# Patient Record
Sex: MALE | Race: WHITE | NOT HISPANIC OR LATINO | Employment: PART TIME | URBAN - METROPOLITAN AREA
[De-identification: names, ages, dates, MRNs, and addresses within clinical notes are randomized per-mention and may not be internally consistent; named-entity substitution may affect disease eponyms.]

---

## 2017-01-06 ENCOUNTER — COMMUNICATION - HEALTHEAST (OUTPATIENT)
Dept: ADMINISTRATIVE | Facility: CLINIC | Age: 52
End: 2017-01-06

## 2017-01-30 ENCOUNTER — COMMUNICATION - HEALTHEAST (OUTPATIENT)
Dept: FAMILY MEDICINE | Facility: CLINIC | Age: 52
End: 2017-01-30

## 2017-01-30 DIAGNOSIS — F25.9 SCHIZOAFFECTIVE DISORDER, UNSPECIFIED TYPE (H): ICD-10-CM

## 2017-01-30 DIAGNOSIS — G47.9 TROUBLE IN SLEEPING: ICD-10-CM

## 2017-03-02 ENCOUNTER — COMMUNICATION - HEALTHEAST (OUTPATIENT)
Dept: FAMILY MEDICINE | Facility: CLINIC | Age: 52
End: 2017-03-02

## 2017-03-03 ENCOUNTER — COMMUNICATION - HEALTHEAST (OUTPATIENT)
Dept: FAMILY MEDICINE | Facility: CLINIC | Age: 52
End: 2017-03-03

## 2017-03-03 DIAGNOSIS — J30.9 ALLERGIC RHINITIS: ICD-10-CM

## 2017-03-05 RX ORDER — LORATADINE 10 MG/1
10 TABLET ORAL DAILY
Qty: 100 TABLET | Refills: 3 | Status: SHIPPED | OUTPATIENT
Start: 2017-03-05

## 2017-03-06 ENCOUNTER — RECORDS - HEALTHEAST (OUTPATIENT)
Dept: ADMINISTRATIVE | Facility: OTHER | Age: 52
End: 2017-03-06

## 2017-03-15 ENCOUNTER — RECORDS - HEALTHEAST (OUTPATIENT)
Dept: ADMINISTRATIVE | Facility: OTHER | Age: 52
End: 2017-03-15

## 2017-05-31 ENCOUNTER — AMBULATORY - HEALTHEAST (OUTPATIENT)
Dept: LAB | Facility: CLINIC | Age: 52
End: 2017-05-31

## 2017-05-31 DIAGNOSIS — C61 PROSTATE CANCER (H): ICD-10-CM

## 2017-06-06 ENCOUNTER — AMBULATORY - HEALTHEAST (OUTPATIENT)
Dept: LAB | Facility: CLINIC | Age: 52
End: 2017-06-06

## 2017-06-06 DIAGNOSIS — C61 PROSTATE CANCER (H): ICD-10-CM

## 2017-06-06 LAB — PSA SERPL-MCNC: <0.1 NG/ML (ref 0–3.5)

## 2017-06-07 ENCOUNTER — COMMUNICATION - HEALTHEAST (OUTPATIENT)
Dept: FAMILY MEDICINE | Facility: CLINIC | Age: 52
End: 2017-06-07

## 2017-08-08 ENCOUNTER — COMMUNICATION - HEALTHEAST (OUTPATIENT)
Dept: FAMILY MEDICINE | Facility: CLINIC | Age: 52
End: 2017-08-08

## 2017-08-08 DIAGNOSIS — R32 INCONTINENCE: ICD-10-CM

## 2017-08-08 DIAGNOSIS — Z79.899 MEDICATION MANAGEMENT: ICD-10-CM

## 2017-08-08 DIAGNOSIS — F41.9 ANXIETY: ICD-10-CM

## 2017-08-08 DIAGNOSIS — F32.A DEPRESSION: ICD-10-CM

## 2017-08-11 ENCOUNTER — RECORDS - HEALTHEAST (OUTPATIENT)
Dept: ADMINISTRATIVE | Facility: OTHER | Age: 52
End: 2017-08-11

## 2017-08-17 ENCOUNTER — COMMUNICATION - HEALTHEAST (OUTPATIENT)
Dept: FAMILY MEDICINE | Facility: CLINIC | Age: 52
End: 2017-08-17

## 2017-08-17 DIAGNOSIS — G47.9 TROUBLE IN SLEEPING: ICD-10-CM

## 2017-08-17 DIAGNOSIS — F25.9 SCHIZOAFFECTIVE DISORDER, UNSPECIFIED TYPE (H): ICD-10-CM

## 2017-09-11 ENCOUNTER — COMMUNICATION - HEALTHEAST (OUTPATIENT)
Dept: ADMINISTRATIVE | Facility: CLINIC | Age: 52
End: 2017-09-11

## 2017-09-21 ENCOUNTER — OFFICE VISIT - HEALTHEAST (OUTPATIENT)
Dept: FAMILY MEDICINE | Facility: CLINIC | Age: 52
End: 2017-09-21

## 2017-09-21 ENCOUNTER — AMBULATORY - HEALTHEAST (OUTPATIENT)
Dept: LAB | Facility: HOSPITAL | Age: 52
End: 2017-09-21

## 2017-09-21 DIAGNOSIS — R30.0 DYSURIA: ICD-10-CM

## 2017-09-21 DIAGNOSIS — C61 PROSTATE CANCER (H): ICD-10-CM

## 2017-09-21 LAB — PSA SERPL-MCNC: <0.1 NG/ML (ref 0–3.5)

## 2017-10-08 ENCOUNTER — COMMUNICATION - HEALTHEAST (OUTPATIENT)
Dept: FAMILY MEDICINE | Facility: CLINIC | Age: 52
End: 2017-10-08

## 2017-10-08 DIAGNOSIS — G47.9 TROUBLE IN SLEEPING: ICD-10-CM

## 2017-10-08 DIAGNOSIS — F25.9 SCHIZOAFFECTIVE DISORDER, UNSPECIFIED TYPE (H): ICD-10-CM

## 2017-10-09 RX ORDER — LORAZEPAM 0.5 MG/1
TABLET ORAL
Qty: 60 TABLET | Refills: 1 | Status: SHIPPED | OUTPATIENT
Start: 2017-10-09 | End: 2022-02-15

## 2017-10-24 ENCOUNTER — COMMUNICATION - HEALTHEAST (OUTPATIENT)
Dept: ADMINISTRATIVE | Facility: CLINIC | Age: 52
End: 2017-10-24

## 2017-10-25 ENCOUNTER — RECORDS - HEALTHEAST (OUTPATIENT)
Dept: ADMINISTRATIVE | Facility: OTHER | Age: 52
End: 2017-10-25

## 2017-12-18 ENCOUNTER — COMMUNICATION - HEALTHEAST (OUTPATIENT)
Dept: ADMINISTRATIVE | Facility: CLINIC | Age: 52
End: 2017-12-18

## 2017-12-19 ENCOUNTER — OFFICE VISIT - HEALTHEAST (OUTPATIENT)
Dept: FAMILY MEDICINE | Facility: CLINIC | Age: 52
End: 2017-12-19

## 2017-12-19 DIAGNOSIS — F33.1 MODERATE EPISODE OF RECURRENT MAJOR DEPRESSIVE DISORDER (H): ICD-10-CM

## 2017-12-19 DIAGNOSIS — Z23 NEED FOR IMMUNIZATION AGAINST INFLUENZA: ICD-10-CM

## 2017-12-19 DIAGNOSIS — Z13.220 ENCOUNTER FOR SCREENING FOR LIPOID DISORDERS: ICD-10-CM

## 2017-12-19 DIAGNOSIS — F09 MILD COGNITIVE DISORDER: ICD-10-CM

## 2017-12-19 DIAGNOSIS — H61.23 BILATERAL IMPACTED CERUMEN: ICD-10-CM

## 2017-12-19 DIAGNOSIS — Z00.01 ENCOUNTER FOR GENERAL ADULT MEDICAL EXAMINATION WITH ABNORMAL FINDINGS: ICD-10-CM

## 2017-12-19 DIAGNOSIS — F25.9 SCHIZOAFFECTIVE DISORDER, UNSPECIFIED TYPE (H): ICD-10-CM

## 2017-12-19 DIAGNOSIS — E66.09 CLASS 2 OBESITY DUE TO EXCESS CALORIES WITHOUT SERIOUS COMORBIDITY WITH BODY MASS INDEX (BMI) OF 36.0 TO 36.9 IN ADULT: ICD-10-CM

## 2017-12-19 DIAGNOSIS — E66.812 CLASS 2 OBESITY DUE TO EXCESS CALORIES WITHOUT SERIOUS COMORBIDITY WITH BODY MASS INDEX (BMI) OF 36.0 TO 36.9 IN ADULT: ICD-10-CM

## 2017-12-19 DIAGNOSIS — R32 INCONTINENCE: ICD-10-CM

## 2017-12-19 LAB
CHOLEST SERPL-MCNC: 137 MG/DL
FASTING STATUS PATIENT QL REPORTED: YES
HDLC SERPL-MCNC: 32 MG/DL
LDLC SERPL CALC-MCNC: 81 MG/DL
TRIGL SERPL-MCNC: 119 MG/DL

## 2017-12-19 ASSESSMENT — MIFFLIN-ST. JEOR: SCORE: 1751.83

## 2017-12-20 ENCOUNTER — COMMUNICATION - HEALTHEAST (OUTPATIENT)
Dept: FAMILY MEDICINE | Facility: CLINIC | Age: 52
End: 2017-12-20

## 2018-01-29 ENCOUNTER — RECORDS - HEALTHEAST (OUTPATIENT)
Dept: ADMINISTRATIVE | Facility: OTHER | Age: 53
End: 2018-01-29

## 2018-04-12 ENCOUNTER — OFFICE VISIT - HEALTHEAST (OUTPATIENT)
Dept: FAMILY MEDICINE | Facility: CLINIC | Age: 53
End: 2018-04-12

## 2018-04-12 DIAGNOSIS — R32 INCONTINENCE: ICD-10-CM

## 2018-04-12 DIAGNOSIS — K52.9 FREQUENT STOOLS: ICD-10-CM

## 2018-04-12 DIAGNOSIS — E66.9 OBESE: ICD-10-CM

## 2018-04-12 DIAGNOSIS — R63.1 INCREASED THIRST: ICD-10-CM

## 2018-04-12 DIAGNOSIS — R35.0 FREQUENT URINATION: ICD-10-CM

## 2018-04-12 DIAGNOSIS — C61 PROSTATE CANCER (H): ICD-10-CM

## 2018-04-12 LAB
ALBUMIN UR-MCNC: NEGATIVE MG/DL
ANION GAP SERPL CALCULATED.3IONS-SCNC: 8 MMOL/L (ref 5–18)
APPEARANCE UR: CLEAR
BILIRUB UR QL STRIP: NEGATIVE
BUN SERPL-MCNC: 14 MG/DL (ref 8–22)
CALCIUM SERPL-MCNC: 9.4 MG/DL (ref 8.5–10.5)
CHLORIDE BLD-SCNC: 103 MMOL/L (ref 98–107)
CO2 SERPL-SCNC: 27 MMOL/L (ref 22–31)
COLOR UR AUTO: YELLOW
CREAT SERPL-MCNC: 0.83 MG/DL (ref 0.7–1.3)
GFR SERPL CREATININE-BSD FRML MDRD: >60 ML/MIN/1.73M2
GLUCOSE BLD-MCNC: 81 MG/DL (ref 70–125)
GLUCOSE UR STRIP-MCNC: NEGATIVE MG/DL
HGB UR QL STRIP: NEGATIVE
KETONES UR STRIP-MCNC: NEGATIVE MG/DL
LEUKOCYTE ESTERASE UR QL STRIP: NEGATIVE
NITRATE UR QL: NEGATIVE
PH UR STRIP: 7 [PH] (ref 5–8)
POTASSIUM BLD-SCNC: 4.8 MMOL/L (ref 3.5–5)
PSA SERPL-MCNC: <0.1 NG/ML (ref 0–3.5)
SODIUM SERPL-SCNC: 138 MMOL/L (ref 136–145)
SP GR UR STRIP: 1.01 (ref 1–1.03)
UROBILINOGEN UR STRIP-ACNC: NORMAL

## 2018-04-12 ASSESSMENT — MIFFLIN-ST. JEOR: SCORE: 1749.79

## 2018-04-13 ENCOUNTER — COMMUNICATION - HEALTHEAST (OUTPATIENT)
Dept: FAMILY MEDICINE | Facility: CLINIC | Age: 53
End: 2018-04-13

## 2018-04-27 ENCOUNTER — OFFICE VISIT - HEALTHEAST (OUTPATIENT)
Dept: AUDIOLOGY | Facility: CLINIC | Age: 53
End: 2018-04-27

## 2018-04-27 DIAGNOSIS — H61.23 EXCESSIVE EAR WAX, BILATERAL: ICD-10-CM

## 2018-05-04 ENCOUNTER — RECORDS - HEALTHEAST (OUTPATIENT)
Dept: ADMINISTRATIVE | Facility: OTHER | Age: 53
End: 2018-05-04

## 2018-05-11 ENCOUNTER — OFFICE VISIT - HEALTHEAST (OUTPATIENT)
Dept: OTOLARYNGOLOGY | Facility: CLINIC | Age: 53
End: 2018-05-11

## 2018-05-11 DIAGNOSIS — H61.23 BILATERAL IMPACTED CERUMEN: ICD-10-CM

## 2018-05-11 DIAGNOSIS — D16.4 OSTEOMA OF EAR CANAL: ICD-10-CM

## 2018-05-24 ENCOUNTER — AMBULATORY - HEALTHEAST (OUTPATIENT)
Dept: LAB | Facility: CLINIC | Age: 53
End: 2018-05-24

## 2018-05-24 DIAGNOSIS — F25.0 SCHIZOAFFECTIVE DISORDER, BIPOLAR TYPE (H): ICD-10-CM

## 2018-05-24 LAB
CHOLEST SERPL-MCNC: 173 MG/DL
FASTING STATUS PATIENT QL REPORTED: YES
FASTING STATUS PATIENT QL REPORTED: YES
GLUCOSE BLD-MCNC: 94 MG/DL (ref 70–99)
HDLC SERPL-MCNC: 30 MG/DL
LDLC SERPL CALC-MCNC: 105 MG/DL
TRIGL SERPL-MCNC: 190 MG/DL

## 2018-05-29 ENCOUNTER — OFFICE VISIT - HEALTHEAST (OUTPATIENT)
Dept: AUDIOLOGY | Facility: CLINIC | Age: 53
End: 2018-05-29

## 2018-05-29 DIAGNOSIS — Z01.10 EXAMINATION OF EARS AND HEARING: ICD-10-CM

## 2018-05-29 DIAGNOSIS — H90.41 SENSORINEURAL HEARING LOSS (SNHL) OF RIGHT EAR WITH UNRESTRICTED HEARING OF LEFT EAR: ICD-10-CM

## 2018-07-05 ENCOUNTER — COMMUNICATION - HEALTHEAST (OUTPATIENT)
Dept: FAMILY MEDICINE | Facility: CLINIC | Age: 53
End: 2018-07-05

## 2018-07-06 ENCOUNTER — COMMUNICATION - HEALTHEAST (OUTPATIENT)
Dept: FAMILY MEDICINE | Facility: CLINIC | Age: 53
End: 2018-07-06

## 2018-07-08 RX ORDER — POLYETHYLENE GLYCOL 3350 17 G/17G
POWDER, FOR SOLUTION ORAL
Qty: 14 EACH | Refills: 11 | Status: SHIPPED | OUTPATIENT
Start: 2018-07-08

## 2018-07-14 ENCOUNTER — OFFICE VISIT - HEALTHEAST (OUTPATIENT)
Dept: FAMILY MEDICINE | Facility: CLINIC | Age: 53
End: 2018-07-14

## 2018-07-14 DIAGNOSIS — M25.521 PAIN IN JOINT, UPPER ARM, RIGHT: ICD-10-CM

## 2018-07-14 RX ORDER — FLUOXETINE 40 MG/1
40 CAPSULE ORAL DAILY
Status: SHIPPED | COMMUNITY
Start: 2018-07-14

## 2018-08-23 ENCOUNTER — RECORDS - HEALTHEAST (OUTPATIENT)
Dept: ADMINISTRATIVE | Facility: OTHER | Age: 53
End: 2018-08-23

## 2018-09-10 ENCOUNTER — OFFICE VISIT - HEALTHEAST (OUTPATIENT)
Dept: FAMILY MEDICINE | Facility: CLINIC | Age: 53
End: 2018-09-10

## 2018-09-10 DIAGNOSIS — E66.09 CLASS 2 OBESITY DUE TO EXCESS CALORIES WITHOUT SERIOUS COMORBIDITY WITH BODY MASS INDEX (BMI) OF 35.0 TO 35.9 IN ADULT: ICD-10-CM

## 2018-09-10 DIAGNOSIS — E66.812 CLASS 2 OBESITY DUE TO EXCESS CALORIES WITHOUT SERIOUS COMORBIDITY WITH BODY MASS INDEX (BMI) OF 35.0 TO 35.9 IN ADULT: ICD-10-CM

## 2018-09-10 DIAGNOSIS — G47.33 OBSTRUCTIVE SLEEP APNEA SYNDROME: ICD-10-CM

## 2018-09-10 DIAGNOSIS — Z23 NEED FOR VACCINATION: ICD-10-CM

## 2018-09-10 DIAGNOSIS — R32 URINARY INCONTINENCE, UNSPECIFIED TYPE: ICD-10-CM

## 2018-09-10 DIAGNOSIS — G47.9 TROUBLE IN SLEEPING: ICD-10-CM

## 2018-09-10 DIAGNOSIS — R68.2 DRY MOUTH: ICD-10-CM

## 2018-09-10 ASSESSMENT — MIFFLIN-ST. JEOR: SCORE: 1711.69

## 2018-09-19 ENCOUNTER — RECORDS - HEALTHEAST (OUTPATIENT)
Dept: ADMINISTRATIVE | Facility: OTHER | Age: 53
End: 2018-09-19

## 2018-10-17 ENCOUNTER — RECORDS - HEALTHEAST (OUTPATIENT)
Dept: ADMINISTRATIVE | Facility: OTHER | Age: 53
End: 2018-10-17

## 2018-10-18 ENCOUNTER — OFFICE VISIT - HEALTHEAST (OUTPATIENT)
Dept: SLEEP MEDICINE | Facility: CLINIC | Age: 53
End: 2018-10-18

## 2018-10-18 DIAGNOSIS — G47.10 HYPERSOMNIA: ICD-10-CM

## 2018-10-18 DIAGNOSIS — G47.8 SLEEP DYSFUNCTION WITH SLEEP STAGE DISTURBANCE: ICD-10-CM

## 2018-10-18 DIAGNOSIS — E66.9 OBESITY: ICD-10-CM

## 2018-10-18 DIAGNOSIS — R06.83 SNORING: ICD-10-CM

## 2018-10-18 ASSESSMENT — MIFFLIN-ST. JEOR: SCORE: 1708.97

## 2018-11-12 ENCOUNTER — RECORDS - HEALTHEAST (OUTPATIENT)
Dept: SLEEP MEDICINE | Facility: CLINIC | Age: 53
End: 2018-11-12

## 2018-11-12 DIAGNOSIS — G47.10 HYPERSOMNIA, UNSPECIFIED: ICD-10-CM

## 2018-11-12 DIAGNOSIS — G47.8 OTHER SLEEP DISORDERS: ICD-10-CM

## 2018-11-12 DIAGNOSIS — E66.9 OBESITY, UNSPECIFIED: ICD-10-CM

## 2018-11-12 DIAGNOSIS — R06.83 SNORING: ICD-10-CM

## 2018-11-15 ENCOUNTER — COMMUNICATION - HEALTHEAST (OUTPATIENT)
Dept: SLEEP MEDICINE | Facility: CLINIC | Age: 53
End: 2018-11-15

## 2018-11-16 ENCOUNTER — COMMUNICATION - HEALTHEAST (OUTPATIENT)
Dept: SLEEP MEDICINE | Facility: CLINIC | Age: 53
End: 2018-11-16

## 2018-11-20 ENCOUNTER — OFFICE VISIT - HEALTHEAST (OUTPATIENT)
Dept: SLEEP MEDICINE | Facility: CLINIC | Age: 53
End: 2018-11-20

## 2018-11-20 DIAGNOSIS — G47.10 HYPERSOMNIA: ICD-10-CM

## 2018-11-20 DIAGNOSIS — G47.33 OBSTRUCTIVE SLEEP APNEA: ICD-10-CM

## 2018-11-20 DIAGNOSIS — G47.69 SLEEP-RELATED MOVEMENT DISORDER: ICD-10-CM

## 2018-12-04 ENCOUNTER — COMMUNICATION - HEALTHEAST (OUTPATIENT)
Dept: FAMILY MEDICINE | Facility: CLINIC | Age: 53
End: 2018-12-04

## 2018-12-05 ENCOUNTER — COMMUNICATION - HEALTHEAST (OUTPATIENT)
Dept: OTHER | Facility: CLINIC | Age: 53
End: 2018-12-05

## 2018-12-07 ENCOUNTER — AMBULATORY - HEALTHEAST (OUTPATIENT)
Dept: OTHER | Facility: CLINIC | Age: 53
End: 2018-12-07

## 2019-01-08 ENCOUNTER — RECORDS - HEALTHEAST (OUTPATIENT)
Dept: ADMINISTRATIVE | Facility: OTHER | Age: 54
End: 2019-01-08

## 2019-01-15 ENCOUNTER — OFFICE VISIT - HEALTHEAST (OUTPATIENT)
Dept: FAMILY MEDICINE | Facility: CLINIC | Age: 54
End: 2019-01-15

## 2019-01-15 ENCOUNTER — COMMUNICATION - HEALTHEAST (OUTPATIENT)
Dept: FAMILY MEDICINE | Facility: CLINIC | Age: 54
End: 2019-01-15

## 2019-01-15 DIAGNOSIS — Z13.220 LIPID SCREENING: ICD-10-CM

## 2019-01-15 DIAGNOSIS — R32 URINARY INCONTINENCE, UNSPECIFIED TYPE: ICD-10-CM

## 2019-01-15 DIAGNOSIS — F25.9 SCHIZOAFFECTIVE DISORDER, UNSPECIFIED TYPE (H): ICD-10-CM

## 2019-01-15 DIAGNOSIS — F09 MILD COGNITIVE DISORDER: ICD-10-CM

## 2019-01-15 DIAGNOSIS — Z00.00 ROUTINE GENERAL MEDICAL EXAMINATION AT A HEALTH CARE FACILITY: ICD-10-CM

## 2019-01-15 DIAGNOSIS — C61 PROSTATE CANCER (H): ICD-10-CM

## 2019-01-15 DIAGNOSIS — F33.1 MODERATE EPISODE OF RECURRENT MAJOR DEPRESSIVE DISORDER (H): ICD-10-CM

## 2019-01-15 DIAGNOSIS — E66.01 MORBID OBESITY (H): ICD-10-CM

## 2019-01-15 LAB
ANION GAP SERPL CALCULATED.3IONS-SCNC: 7 MMOL/L (ref 5–18)
BUN SERPL-MCNC: 12 MG/DL (ref 8–22)
CALCIUM SERPL-MCNC: 9 MG/DL (ref 8.5–10.5)
CHLORIDE BLD-SCNC: 104 MMOL/L (ref 98–107)
CHOLEST SERPL-MCNC: 179 MG/DL
CO2 SERPL-SCNC: 27 MMOL/L (ref 22–31)
CREAT SERPL-MCNC: 0.86 MG/DL (ref 0.7–1.3)
FASTING STATUS PATIENT QL REPORTED: YES
GFR SERPL CREATININE-BSD FRML MDRD: >60 ML/MIN/1.73M2
GLUCOSE BLD-MCNC: 89 MG/DL (ref 70–125)
HDLC SERPL-MCNC: 35 MG/DL
LDLC SERPL CALC-MCNC: 107 MG/DL
POTASSIUM BLD-SCNC: 4.3 MMOL/L (ref 3.5–5)
SODIUM SERPL-SCNC: 138 MMOL/L (ref 136–145)
TRIGL SERPL-MCNC: 184 MG/DL

## 2019-01-15 ASSESSMENT — MIFFLIN-ST. JEOR: SCORE: 1671.55

## 2019-01-25 ENCOUNTER — OFFICE VISIT - HEALTHEAST (OUTPATIENT)
Dept: OTOLARYNGOLOGY | Facility: CLINIC | Age: 54
End: 2019-01-25

## 2019-01-25 ENCOUNTER — OFFICE VISIT - HEALTHEAST (OUTPATIENT)
Dept: SLEEP MEDICINE | Facility: CLINIC | Age: 54
End: 2019-01-25

## 2019-01-25 DIAGNOSIS — G47.8 SLEEP DYSFUNCTION WITH SLEEP STAGE DISTURBANCE: ICD-10-CM

## 2019-01-25 DIAGNOSIS — G47.33 OBSTRUCTIVE SLEEP APNEA: ICD-10-CM

## 2019-01-25 DIAGNOSIS — H61.23 BILATERAL IMPACTED CERUMEN: ICD-10-CM

## 2019-01-25 ASSESSMENT — MIFFLIN-ST. JEOR: SCORE: 1705.79

## 2019-02-21 ENCOUNTER — RECORDS - HEALTHEAST (OUTPATIENT)
Dept: ADMINISTRATIVE | Facility: OTHER | Age: 54
End: 2019-02-21

## 2019-03-14 ENCOUNTER — RECORDS - HEALTHEAST (OUTPATIENT)
Dept: ADMINISTRATIVE | Facility: OTHER | Age: 54
End: 2019-03-14

## 2019-03-25 ENCOUNTER — RECORDS - HEALTHEAST (OUTPATIENT)
Dept: ADMINISTRATIVE | Facility: OTHER | Age: 54
End: 2019-03-25

## 2019-03-25 ENCOUNTER — OFFICE VISIT - HEALTHEAST (OUTPATIENT)
Dept: FAMILY MEDICINE | Facility: CLINIC | Age: 54
End: 2019-03-25

## 2019-03-25 DIAGNOSIS — R63.1 POLYDIPSIA: ICD-10-CM

## 2019-03-25 LAB
ANION GAP SERPL CALCULATED.3IONS-SCNC: 8 MMOL/L (ref 5–18)
BUN SERPL-MCNC: 13 MG/DL (ref 8–22)
CALCIUM SERPL-MCNC: 9.3 MG/DL (ref 8.5–10.5)
CHLORIDE BLD-SCNC: 102 MMOL/L (ref 98–107)
CHLORIDE UR-SCNC: 26 MMOL/L
CO2 SERPL-SCNC: 27 MMOL/L (ref 22–31)
CREAT SERPL-MCNC: 0.88 MG/DL (ref 0.7–1.3)
GFR SERPL CREATININE-BSD FRML MDRD: >60 ML/MIN/1.73M2
GLUCOSE BLD-MCNC: 81 MG/DL (ref 70–125)
GLUCOSE UR STRIP-MCNC: NEGATIVE MG/DL
OSMOLALITY UR: 135 MOSM/KG (ref 300–900)
POTASSIUM BLD-SCNC: 4.8 MMOL/L (ref 3.5–5)
POTASSIUM UR-SCNC: 25.4 MMOL/L
SODIUM SERPL-SCNC: 137 MMOL/L (ref 136–145)
SODIUM UR-SCNC: 25 MMOL/L
UUN UR-MCNC: 144 MG/DL

## 2019-03-25 RX ORDER — MIRABEGRON 50 MG/1
50 TABLET, EXTENDED RELEASE ORAL DAILY
Status: SHIPPED | COMMUNITY
Start: 2019-03-25

## 2019-03-25 ASSESSMENT — MIFFLIN-ST. JEOR: SCORE: 1708.51

## 2019-03-27 ENCOUNTER — COMMUNICATION - HEALTHEAST (OUTPATIENT)
Dept: FAMILY MEDICINE | Facility: CLINIC | Age: 54
End: 2019-03-27

## 2019-04-12 ENCOUNTER — AMBULATORY - HEALTHEAST (OUTPATIENT)
Dept: LAB | Facility: CLINIC | Age: 54
End: 2019-04-12

## 2019-04-16 ENCOUNTER — AMBULATORY - HEALTHEAST (OUTPATIENT)
Dept: FAMILY MEDICINE | Facility: CLINIC | Age: 54
End: 2019-04-16

## 2019-04-16 DIAGNOSIS — R63.1 POLYDIPSIA: ICD-10-CM

## 2019-04-17 ENCOUNTER — AMBULATORY - HEALTHEAST (OUTPATIENT)
Dept: LAB | Facility: CLINIC | Age: 54
End: 2019-04-17

## 2019-04-17 DIAGNOSIS — R63.1 POLYDIPSIA: ICD-10-CM

## 2019-04-17 LAB
ANION GAP SERPL CALCULATED.3IONS-SCNC: 8 MMOL/L (ref 5–18)
BUN SERPL-MCNC: 13 MG/DL (ref 8–22)
CALCIUM SERPL-MCNC: 9.2 MG/DL (ref 8.5–10.5)
CHLORIDE BLD-SCNC: 106 MMOL/L (ref 98–107)
CO2 SERPL-SCNC: 25 MMOL/L (ref 22–31)
CREAT SERPL-MCNC: 0.85 MG/DL (ref 0.7–1.3)
GFR SERPL CREATININE-BSD FRML MDRD: >60 ML/MIN/1.73M2
GLUCOSE BLD-MCNC: 100 MG/DL (ref 70–125)
OSMOLALITY SERPL: 291 MOSM/KG (ref 270–300)
OSMOLALITY UR: 524 MOSM/KG (ref 300–900)
POTASSIUM BLD-SCNC: 4.4 MMOL/L (ref 3.5–5)
SODIUM SERPL-SCNC: 139 MMOL/L (ref 136–145)

## 2019-04-18 ENCOUNTER — COMMUNICATION - HEALTHEAST (OUTPATIENT)
Dept: FAMILY MEDICINE | Facility: CLINIC | Age: 54
End: 2019-04-18

## 2019-06-05 ENCOUNTER — RECORDS - HEALTHEAST (OUTPATIENT)
Dept: ADMINISTRATIVE | Facility: OTHER | Age: 54
End: 2019-06-05

## 2019-06-14 ENCOUNTER — RECORDS - HEALTHEAST (OUTPATIENT)
Dept: ADMINISTRATIVE | Facility: OTHER | Age: 54
End: 2019-06-14

## 2019-07-03 ENCOUNTER — OFFICE VISIT - HEALTHEAST (OUTPATIENT)
Dept: SLEEP MEDICINE | Facility: CLINIC | Age: 54
End: 2019-07-03

## 2019-07-03 DIAGNOSIS — G47.33 OBSTRUCTIVE SLEEP APNEA: ICD-10-CM

## 2019-07-03 DIAGNOSIS — G47.8 SLEEP DYSFUNCTION WITH SLEEP STAGE DISTURBANCE: ICD-10-CM

## 2019-07-03 DIAGNOSIS — G47.10 HYPERSOMNIA: ICD-10-CM

## 2019-07-03 ASSESSMENT — MIFFLIN-ST. JEOR: SCORE: 1770.2

## 2019-07-17 ENCOUNTER — RECORDS - HEALTHEAST (OUTPATIENT)
Dept: SLEEP MEDICINE | Facility: CLINIC | Age: 54
End: 2019-07-17

## 2019-07-17 DIAGNOSIS — G47.10 HYPERSOMNIA, UNSPECIFIED: ICD-10-CM

## 2019-07-17 DIAGNOSIS — G47.33 OBSTRUCTIVE SLEEP APNEA (ADULT) (PEDIATRIC): ICD-10-CM

## 2019-07-17 DIAGNOSIS — G47.8 OTHER SLEEP DISORDERS: ICD-10-CM

## 2019-07-23 ENCOUNTER — COMMUNICATION - HEALTHEAST (OUTPATIENT)
Dept: SLEEP MEDICINE | Facility: CLINIC | Age: 54
End: 2019-07-23

## 2019-07-24 ENCOUNTER — RECORDS - HEALTHEAST (OUTPATIENT)
Dept: ADMINISTRATIVE | Facility: OTHER | Age: 54
End: 2019-07-24

## 2019-07-29 ENCOUNTER — COMMUNICATION - HEALTHEAST (OUTPATIENT)
Dept: SLEEP MEDICINE | Facility: CLINIC | Age: 54
End: 2019-07-29

## 2019-07-29 DIAGNOSIS — G47.33 OBSTRUCTIVE SLEEP APNEA: ICD-10-CM

## 2019-08-14 ENCOUNTER — RECORDS - HEALTHEAST (OUTPATIENT)
Dept: ADMINISTRATIVE | Facility: OTHER | Age: 54
End: 2019-08-14

## 2019-10-02 ENCOUNTER — RECORDS - HEALTHEAST (OUTPATIENT)
Dept: ADMINISTRATIVE | Facility: OTHER | Age: 54
End: 2019-10-02

## 2019-10-09 ENCOUNTER — AMBULATORY - HEALTHEAST (OUTPATIENT)
Dept: NURSING | Facility: CLINIC | Age: 54
End: 2019-10-09

## 2019-10-16 ENCOUNTER — OFFICE VISIT - HEALTHEAST (OUTPATIENT)
Dept: SLEEP MEDICINE | Facility: CLINIC | Age: 54
End: 2019-10-16

## 2019-10-16 DIAGNOSIS — G47.10 HYPERSOMNIA: ICD-10-CM

## 2019-10-16 DIAGNOSIS — G47.33 OBSTRUCTIVE SLEEP APNEA: ICD-10-CM

## 2019-10-16 ASSESSMENT — MIFFLIN-ST. JEOR: SCORE: 1801.95

## 2020-01-06 ENCOUNTER — RECORDS - HEALTHEAST (OUTPATIENT)
Dept: ADMINISTRATIVE | Facility: OTHER | Age: 55
End: 2020-01-06

## 2020-01-09 ENCOUNTER — OFFICE VISIT - HEALTHEAST (OUTPATIENT)
Dept: FAMILY MEDICINE | Facility: CLINIC | Age: 55
End: 2020-01-09

## 2020-01-09 ENCOUNTER — COMMUNICATION - HEALTHEAST (OUTPATIENT)
Dept: FAMILY MEDICINE | Facility: CLINIC | Age: 55
End: 2020-01-09

## 2020-01-09 DIAGNOSIS — E66.01 MORBID OBESITY (H): ICD-10-CM

## 2020-01-09 DIAGNOSIS — F09 MILD COGNITIVE DISORDER: ICD-10-CM

## 2020-01-09 DIAGNOSIS — F33.42 RECURRENT MAJOR DEPRESSIVE DISORDER, IN FULL REMISSION (H): ICD-10-CM

## 2020-01-09 DIAGNOSIS — R32 URINARY INCONTINENCE, UNSPECIFIED TYPE: ICD-10-CM

## 2020-01-09 DIAGNOSIS — H61.23 BILATERAL IMPACTED CERUMEN: ICD-10-CM

## 2020-01-09 DIAGNOSIS — Z13.220 LIPID SCREENING: ICD-10-CM

## 2020-01-09 DIAGNOSIS — Z00.00 ROUTINE GENERAL MEDICAL EXAMINATION AT A HEALTH CARE FACILITY: ICD-10-CM

## 2020-01-09 DIAGNOSIS — C61 PROSTATE CANCER (H): ICD-10-CM

## 2020-01-09 DIAGNOSIS — F25.9 SCHIZOAFFECTIVE DISORDER, UNSPECIFIED TYPE (H): ICD-10-CM

## 2020-01-09 LAB
ANION GAP SERPL CALCULATED.3IONS-SCNC: 8 MMOL/L (ref 5–18)
BUN SERPL-MCNC: 10 MG/DL (ref 8–22)
CALCIUM SERPL-MCNC: 9.3 MG/DL (ref 8.5–10.5)
CHLORIDE BLD-SCNC: 108 MMOL/L (ref 98–107)
CHOLEST SERPL-MCNC: 196 MG/DL
CO2 SERPL-SCNC: 24 MMOL/L (ref 22–31)
CREAT SERPL-MCNC: 0.91 MG/DL (ref 0.7–1.3)
FASTING STATUS PATIENT QL REPORTED: YES
GFR SERPL CREATININE-BSD FRML MDRD: >60 ML/MIN/1.73M2
GLUCOSE BLD-MCNC: 104 MG/DL (ref 70–125)
HDLC SERPL-MCNC: 35 MG/DL
LDLC SERPL CALC-MCNC: 113 MG/DL
POTASSIUM BLD-SCNC: 4.2 MMOL/L (ref 3.5–5)
SODIUM SERPL-SCNC: 140 MMOL/L (ref 136–145)
TRIGL SERPL-MCNC: 241 MG/DL

## 2020-01-09 ASSESSMENT — ANXIETY QUESTIONNAIRES
1. FEELING NERVOUS, ANXIOUS, OR ON EDGE: NOT AT ALL
2. NOT BEING ABLE TO STOP OR CONTROL WORRYING: NOT AT ALL

## 2020-01-09 ASSESSMENT — MIFFLIN-ST. JEOR: SCORE: 1790.67

## 2020-01-09 ASSESSMENT — PATIENT HEALTH QUESTIONNAIRE - PHQ9: SUM OF ALL RESPONSES TO PHQ QUESTIONS 1-9: 4

## 2020-02-03 ENCOUNTER — RECORDS - HEALTHEAST (OUTPATIENT)
Dept: ADMINISTRATIVE | Facility: OTHER | Age: 55
End: 2020-02-03

## 2020-02-10 ENCOUNTER — RECORDS - HEALTHEAST (OUTPATIENT)
Dept: ADMINISTRATIVE | Facility: OTHER | Age: 55
End: 2020-02-10

## 2020-03-06 ENCOUNTER — COMMUNICATION - HEALTHEAST (OUTPATIENT)
Dept: SCHEDULING | Facility: CLINIC | Age: 55
End: 2020-03-06

## 2020-03-09 ENCOUNTER — OFFICE VISIT - HEALTHEAST (OUTPATIENT)
Dept: FAMILY MEDICINE | Facility: CLINIC | Age: 55
End: 2020-03-09

## 2020-03-09 DIAGNOSIS — Z20.828 EXPOSURE TO VIRAL DISEASE: ICD-10-CM

## 2020-03-09 DIAGNOSIS — R19.7 DIARRHEA, UNSPECIFIED TYPE: ICD-10-CM

## 2020-03-09 ASSESSMENT — MIFFLIN-ST. JEOR: SCORE: 1793.08

## 2020-06-05 ENCOUNTER — COMMUNICATION - HEALTHEAST (OUTPATIENT)
Dept: OTHER | Facility: CLINIC | Age: 55
End: 2020-06-05

## 2020-06-16 ENCOUNTER — RECORDS - HEALTHEAST (OUTPATIENT)
Dept: ADMINISTRATIVE | Facility: OTHER | Age: 55
End: 2020-06-16

## 2020-07-16 ENCOUNTER — COMMUNICATION - HEALTHEAST (OUTPATIENT)
Dept: FAMILY MEDICINE | Facility: CLINIC | Age: 55
End: 2020-07-16

## 2020-07-20 ENCOUNTER — RECORDS - HEALTHEAST (OUTPATIENT)
Dept: ADMINISTRATIVE | Facility: OTHER | Age: 55
End: 2020-07-20

## 2020-08-04 ENCOUNTER — COMMUNICATION - HEALTHEAST (OUTPATIENT)
Dept: PODIATRY | Facility: CLINIC | Age: 55
End: 2020-08-04

## 2020-08-05 ENCOUNTER — OFFICE VISIT - HEALTHEAST (OUTPATIENT)
Dept: PODIATRY | Facility: CLINIC | Age: 55
End: 2020-08-05

## 2020-08-05 DIAGNOSIS — L60.0 INGROWN NAIL: ICD-10-CM

## 2020-08-05 RX ORDER — GABAPENTIN 100 MG/1
CAPSULE ORAL
Status: SHIPPED | COMMUNITY
Start: 2020-07-22 | End: 2022-02-15

## 2020-08-05 RX ORDER — FESOTERODINE FUMARATE 4 MG/1
4 TABLET, FILM COATED, EXTENDED RELEASE ORAL DAILY
Status: SHIPPED | COMMUNITY
Start: 2020-07-22

## 2020-08-05 RX ORDER — ACETAMINOPHEN 500 MG
1000 TABLET ORAL EVERY 6 HOURS PRN
Status: SHIPPED | COMMUNITY
Start: 2020-08-05

## 2020-08-07 ENCOUNTER — TELEPHONE (OUTPATIENT)
Dept: SLEEP MEDICINE | Facility: CLINIC | Age: 55
End: 2020-08-07

## 2020-08-07 DIAGNOSIS — G47.33 OBSTRUCTIVE SLEEP APNEA: Primary | ICD-10-CM

## 2020-08-07 NOTE — TELEPHONE ENCOUNTER
Vivi at [facility] called today.    States that patient needs a new order for mask and head gear.  Patient states currently broken.    Would like a callback on order request and where to pickup up today at [phone].    Thank you.    Central Scheduling  Devi GAVIN

## 2020-08-11 ENCOUNTER — OFFICE VISIT - HEALTHEAST (OUTPATIENT)
Dept: PODIATRY | Facility: CLINIC | Age: 55
End: 2020-08-11

## 2020-08-11 DIAGNOSIS — L60.0 INGROWN NAIL: ICD-10-CM

## 2020-09-22 DIAGNOSIS — G47.33 OBSTRUCTIVE SLEEP APNEA: Primary | ICD-10-CM

## 2020-12-21 ENCOUNTER — RECORDS - HEALTHEAST (OUTPATIENT)
Dept: ADMINISTRATIVE | Facility: OTHER | Age: 55
End: 2020-12-21

## 2021-03-04 ENCOUNTER — TELEPHONE (OUTPATIENT)
Dept: SLEEP MEDICINE | Facility: CLINIC | Age: 56
End: 2021-03-04

## 2021-03-04 NOTE — TELEPHONE ENCOUNTER
CALLED AND SPOKE WITH JOSUE IN REGARDS TO THE PT DATA. THE JOSUE WHO IS THE PT NURSE STATED THAT THE DATA HAS NOT UPLOADED FOR THE LAST COUPLE OF NIGHTS. WHEN LOOKING AT THE DATA TRANSMISSION STOP ON 2/26/2021. HAD JOSUE UNPLUG THE CPAP MACHINE AND PLUG IN TO ANOTHER OUTLET AND POWER THE MACHINE ON TO FORCE A TRANSMISSION. THIS WAS A SUCCESS AND FORCE MORE DATA TO TRANSMIT.

## 2021-05-26 ASSESSMENT — PATIENT HEALTH QUESTIONNAIRE - PHQ9: SUM OF ALL RESPONSES TO PHQ QUESTIONS 1-9: 4

## 2021-05-27 NOTE — TELEPHONE ENCOUNTER
Spoke with group home. They will give this message to El caregiver, but message will probably need to be given again to her when she calls back. Please help set up lab appointment also when she calls back

## 2021-05-27 NOTE — TELEPHONE ENCOUNTER
----- Message from Cheryl Parikh MD sent at 4/17/2019  7:43 PM CDT -----  Please call patient's caregiver (?)  The labs show that Dayday does seem to concentrate the urine appropriately when he restricts fluids.  I would try to monitor the fluid intake a bit closer and try to have him stick with no more than 10 cups of fluid daily.  He can suck on biotene lozenges if his mouth feels dry, or possibly chew gum?  This should help with his excessive urination.  SUBHASH Parikh MD

## 2021-05-27 NOTE — PROGRESS NOTES
"  Assessment/Plan:      Polydipsia  Workup will be completed as below.  This would technically be more accurate with a 24-hour sample, but patient is incontinent and this would require catheterization, which she is very resistant to.  His urologist had suggested fluid restriction, I would suggest holding off until further workup is completed.  He has a dry mouth at baseline and has been utilizing some Biotene, which is appropriate.  After workup returns, we will discuss with patient's psychiatric PA to see if any of his current medications could be the culprit.  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  Unclear if patient was on lithium in the past.  He does not recall this.  - Osmolality, Random, Urine  - Sodium, Random Urine  - Potassium, Random Urine  - Chloride, Random Urine  - Urea Nitrogen, Random Urine  - Glucose,Urine,Semi-Quantitative  - Basic Metabolic Panel        Subjective:       Dayday Silva is a 53 y.o. male who presents for workup of possible diabetes insipidus.  Patient's urologist suspect that this is why patient is urinating so frequently and having incontinence.  He thinks this has been an issue for \"many years\", since he was a young person.  His , who accompanies him today, states that this was not as big of an issue when he initially moved into his current group home about 4 years ago.  She believes this issue has gotten worse.  He is thirsty much of the time, his urologist did some sort of quantification of how much she is taking in and he is drinking over 4 L of fluid daily apparently.  Is unclear what other testing was done at his urology appointment, but ultimately the concern was for possible diabetes insipidus and he was recommended to come here for further workup.  He was taken off oxybutynin and transitioned to Myrbetriq for his overactive bladder symptoms.  He also continues to complain of dry mouth.  It is unclear how long this " symptom has been present.  He has been utilizing some Biotene lozenges for this.  His urologist apparently recommended a water restriction.  His current psychiatric medications include Abilify, Cogentin, hydroxyzine as needed, and Effexor.  These have not changed recently.  He has no other symptoms to report, denies any burning or pain with urination.  He does not necessarily feel excessively thirsty, apart from the dry mouth, which causes him to drink frequently throughout the day.    The following portions of the patient's history were reviewed and updated as appropriate: allergies, current medications, past medical history, past social history and problem list.      Current Outpatient Medications:      ARIPiprazole (ABILIFY) 30 MG tablet, Take 30 mg by mouth daily., Disp: , Rfl:      aspirin 81 MG EC tablet, Take 1 tablet (81 mg total) by mouth daily., Disp: 120 tablet, Rfl: 3     benztropine (COGENTIN) 0.5 MG tablet, Take 1 tablet (0.5 mg total) by mouth daily., Disp: 30 tablet, Rfl: 2     diaper,brief,adult,disposable Misc, Use 1 each As Directed continuous as needed., Disp: 120 each, Rfl: 5     disposable gloves Misc, Use as needed., Disp: 100 each, Rfl: 3     FLUoxetine (PROZAC) 40 MG capsule, Take 40 mg by mouth daily., Disp: , Rfl:      hydrOXYzine HCl (ATARAX) 25 MG tablet, Take 25 mg by mouth 3 (three) times a day as needed for itching., Disp: , Rfl:      ketotifen (ZADITOR/ZYRTEC ITCHY EYES) 0.025 % (0.035 %) ophthalmic solution, INSTILL ONE DROP IN BOTH EYES UP TO TWICE DAILY AS NEEDED **12 TOTAL FILLS*, Disp: 5 mL, Rfl: 10     loratadine (CLARITIN) 10 mg tablet, Take 1 tablet (10 mg total) by mouth daily., Disp: 100 tablet, Rfl: 3     LORazepam (ATIVAN) 0.5 MG tablet, TAKE 2 TABLETS (1MG) BY MOUTH EVERY 8 HOURS AS NEEDED FOR ANXIETY OR SLEEP **1 TOTAL FILL*, Disp: 60 tablet, Rfl: 1     mirabegron (MYRBETRIQ) 50 mg Tb24 ER tablet, Take 50 mg by mouth daily., Disp: , Rfl:      multivitamin with folic  "acid (ONE DAILY MULTIVITAMIN) 400 mcg Tab, Take 1 tablet by mouth daily., Disp: 90 tablet, Rfl: 3     polyethylene glycol (MIRALAX) 17 gram packet, DISSOLVE 1 PACKET INTO LIQUID AND DRINK BY MOUTH EVERY OTHER DAY;INCREASE TO ONCE DAILY DOSE IF CONSTIPATED., Disp: 14 each, Rfl: 11     trolamine salicylate-aloe vera (ASPERCREME WITH ALOE) 10 % Crea, Apply to left shoulder area 3x daily as needed., Disp: 1 Tube, Rfl: 2     venlafaxine (EFFEXOR-XR) 150 MG 24 hr capsule, Take 1 capsule (150 mg total) by mouth daily., Disp: 30 capsule, Rfl: 5     oxybutynin (DITROPAN XL) 10 MG ER tablet, Take 10 mg by mouth daily., Disp: , Rfl:     Review of Systems   Pertinent items are noted in HPI.      Objective:      /60 (Patient Site: Right Arm, Patient Position: Sitting, Cuff Size: Adult Large)   Pulse 84   Resp 20   Ht 5' 6\" (1.676 m)   Wt 207 lb 6.4 oz (94.1 kg)   BMI 33.48 kg/m      General appearance: alert, appears stated age and cooperative  Lungs: clear to auscultation bilaterally  Heart: regular rate and rhythm, S1, S2 normal, no murmur, click, rub or gallop  Extremities: extremities normal, atraumatic, no cyanosis or edema          "

## 2021-05-27 NOTE — TELEPHONE ENCOUNTER
Left message to call back for: Vivi Jamila, [facility] [place], Consent to Communicate    Information to relay to patient:  LMTCB    Please give below information from Dr. Parikh    Please call patient's caregiver (?)   The labs show that Dayday does seem to concentrate the urine appropriately when he restricts fluids.  I would try to monitor the fluid intake a bit closer and try to have him stick with no more than 10 cups of fluid daily.  He can suck on biotene lozenges if his mouth feels dry, or possibly chew gum?  This should help with his excessive urination.   SUBHASH Larry, [facility]

## 2021-05-27 NOTE — TELEPHONE ENCOUNTER
----- Message from Cheryl Parikh MD sent at 3/26/2019  9:56 PM CDT -----  Please call patient's caregiver:  The labs are helpful, but to continue to determine if his kidneys are not properly concentrating the urine or if he is just drinking too much fluid, it is recommended to restrict fluid overnight and have him come in to the lab in the AM for a blood draw and urine test.  Can we arrange this?  (I would say 8 hours of fluid restriction would be sufficient)  Let me know if this is possible and I will order labs.  SUBHASH Parikh MD

## 2021-05-28 NOTE — TELEPHONE ENCOUNTER
Called and gave Vivi staff message below      Please call patient's caregiver (?)   The labs show that Dayday does seem to concentrate the urine appropriately when he restricts fluids.  I would try to monitor the fluid intake a bit closer and try to have him stick with no more than 10 cups of fluid daily.  He can suck on biotene lozenges if his mouth feels dry, or possibly chew gum?  This should help with his excessive urination.   SUBHASH Larry, NISREEN Trinidad expressed understanding.    Carmel Larry CMA

## 2021-05-28 NOTE — TELEPHONE ENCOUNTER
Left message to call back for: Vivi Jamila, [facility] [place], Consent to Communicate     Information to relay to patient:  LMTCB     Please give below information from Dr. Parikh     Please call patient's caregiver (?)   The labs show that Dayday does seem to concentrate the urine appropriately when he restricts fluids.  I would try to monitor the fluid intake a bit closer and try to have him stick with no more than 10 cups of fluid daily.  He can suck on biotene lozenges if his mouth feels dry, or possibly chew gum?  This should help with his excessive urination.   SUBHASH Larry, [facility]

## 2021-05-28 NOTE — TELEPHONE ENCOUNTER
Called mother Chrissy, unable to leave message, says voicemail not set up yet. Please give message as below from Dr. Parikh.    Carmel Larry, CMA

## 2021-05-30 NOTE — TELEPHONE ENCOUNTER
Order for Durable Medical Equipment was processed and equipment ordered.     Date: 7/29/2019    Equipment Ordered: New CPAP device     DME Company: [employer]    Fax Number: Pacee Her ([facility])    Ordering Provider: Jose Haywood CMA 7/29/2019 4:07 PM

## 2021-05-30 NOTE — TELEPHONE ENCOUNTER
Please call the patient to schedule for an earlier follow-up visit to review the results of the sleep study.  Please schedule patient to follow-up with me within 5 weeks.  May double book except for my 1/2 days.Thank you.

## 2021-05-30 NOTE — PROGRESS NOTES
Dear Dr. Parikh, Cheryl Bernardo MD  [address]  [address]  [place] [address],    Thank you for the opportunity to participate in the care of Dayday Silva.     He is a 54 y.o. y/o male patient who comes to the sleep medicine clinic for follow up.  Unfortunately, the patient failed compliance with CPAP therapy and will have to undergo another sleep study to qualify him for CPAP therapy.  He is willing to proceed with another sleep study at this point in time.    Compliance Download data for 30 days:  Pressure setting: APAP 9-15 CWP  Residual AHI: 2.1 events per hour  Leak: Minimal  Compliance: 60%  Mask Tolerance: Good  Skin irritation: None    Past Medical History:   Diagnosis Date     Allergic Rhinitis      Cancer (H)     Prostate     Chronic Major Depression      Class 2 obesity due to excess calories with serious comorbidity and body mass index (BMI) of 36.0 to 36.9 in adult     Created by Conversion      Lower Back Pain Chronic      Obesity      Schizoaffective disorder (H) 6/24/2015       Past Surgical History:   Procedure Laterality Date     HEMORRHOID SURGERY       OK LAP,PROSTATECTOMY,RADICAL,W/NERVE SPARE,INCL ROBOTIC Bilateral 12/7/2016    Procedure: DA ETHAN RADICAL RETROPUBIC PROSTATECTOMY, BILATERAL PELVIC LYMPH NODE DISSECTION ;  Surgeon: Ulisses French MD;  Location: [facility];  Service: Urology     PROSTATE BIOPSY       PROSTATECTOMY  12/07/2016       Social History     Socioeconomic History     Marital status: Single     Spouse name: Not on file     Number of children: Not on file     Years of education: Not on file     Highest education level: Not on file   Occupational History     Occupation:      Comment: [employer]   Social Needs     Financial resource strain: Not on file     Food insecurity:     Worry: Not on file     Inability: Not on file     Transportation needs:     Medical: Not on file     Non-medical: Not on file   Tobacco Use     Smoking status: Former Smoker      Last attempt to quit: 2016     Years since quitting: 3.5     Smokeless tobacco: Former User   Substance and Sexual Activity     Alcohol use: No     Drug use: No     Sexual activity: Never   Lifestyle     Physical activity:     Days per week: Not on file     Minutes per session: Not on file     Stress: Not on file   Relationships     Social connections:     Talks on phone: Not on file     Gets together: Not on file     Attends Jewish service: Not on file     Active member of club or organization: Not on file     Attends meetings of clubs or organizations: Not on file     Relationship status: Not on file     Intimate partner violence:     Fear of current or ex partner: Not on file     Emotionally abused: Not on file     Physically abused: Not on file     Forced sexual activity: Not on file   Other Topics Concern     Not on file   Social History Narrative     Not on file       Current Outpatient Medications   Medication Sig Dispense Refill     ARIPiprazole (ABILIFY) 30 MG tablet Take 30 mg by mouth daily.       aspirin 81 MG EC tablet Take 1 tablet (81 mg total) by mouth daily. 120 tablet 3     diaper,brief,adult,disposable Misc Use 1 each As Directed continuous as needed. 120 each 5     disposable gloves Misc Use as needed. 100 each 3     FLUoxetine (PROZAC) 40 MG capsule Take 40 mg by mouth daily.       hydrOXYzine HCl (ATARAX) 25 MG tablet Take 25 mg by mouth 3 (three) times a day as needed for itching.       ketotifen (ZADITOR/ZYRTEC ITCHY EYES) 0.025 % (0.035 %) ophthalmic solution INSTILL ONE DROP IN BOTH EYES UP TO TWICE DAILY AS NEEDED **12 TOTAL FILLS* 5 mL 10     loratadine (CLARITIN) 10 mg tablet Take 1 tablet (10 mg total) by mouth daily. 100 tablet 3     LORazepam (ATIVAN) 0.5 MG tablet TAKE 2 TABLETS (1MG) BY MOUTH EVERY 8 HOURS AS NEEDED FOR ANXIETY OR SLEEP **1 TOTAL FILL* 60 tablet 1     mirabegron (MYRBETRIQ) 50 mg Tb24 ER tablet Take 50 mg by mouth daily.       multivitamin with folic acid (ONE  "DAILY MULTIVITAMIN) 400 mcg Tab Take 1 tablet by mouth daily. 90 tablet 3     polyethylene glycol (MIRALAX) 17 gram packet DISSOLVE 1 PACKET INTO LIQUID AND DRINK BY MOUTH EVERY OTHER DAY;INCREASE TO ONCE DAILY DOSE IF CONSTIPATED. 14 each 11     trolamine salicylate-aloe vera (ASPERCREME WITH ALOE) 10 % Crea Apply to left shoulder area 3x daily as needed. 1 Tube 2     No current facility-administered medications for this visit.        No Known Allergies    Epworths Sleepiness Scale 10/18/2018 1/25/2019 7/3/2019   Sitting and reading 2 2 2   Watching TV 3 2 1   Sitting, inactive in a public place (e.g. a theatre or a meeting) 2 3 3   As a passenger in a car for an hour without a break 3 2 2   Lying down to rest in the afternoon when circumstances permit 1 1 3   Sitting and talking to someone 0 2 0   Sitting quietly after a lunch without alcohol 1 1 1   In a car, while stopped for a few minutes in traffic 0 1 0   Total score 12 14 12       Physical Exam:  /70   Pulse 80   Ht 5' 6\" (1.676 m)   Wt 221 lb (100.2 kg)   SpO2 94%   BMI 35.67 kg/m    BMI:Body mass index is 35.67 kg/m .   GEN: NAD, obese  Psych: normal mood, normal affect    Labs/Studies:    I reviewed the efficacy and compliance report from his device. Data summarized on the HPI and the PAP compliance flow sheet.         Chemistry        Component Value Date/Time     04/17/2019 0803    K 4.4 04/17/2019 0803     04/17/2019 0803    CO2 25 04/17/2019 0803    BUN 13 04/17/2019 0803    CREATININE 0.85 04/17/2019 0803     04/17/2019 0803        Component Value Date/Time    CALCIUM 9.2 04/17/2019 0803    ALKPHOS 72 09/11/2016 1633    AST 23 09/11/2016 1633    ALT 29 09/11/2016 1633    BILITOT 1.2 (H) 09/11/2016 1633            No results found for: FERRITIN         Assessment and Plan:  In summary Dayday Silva is a 54 y.o. year old male who is here for follow-up.    1.  Obstructive sleep apnea  Patient is currently paying rental on " his CPAP machine.  He wishes to requalify for CPAP therapy again.  I will therefore order another sleep study strongly advised him to follow through with compliance.  2.  Hypersomnia  Improving on CPAP  3.  Other sleep disturbance     Patient verbalized understanding of these issues, agrees with the plan and all questions were answered today. Patient was given an opportuntity to voice any other symptoms or concerns not listed above. Patient did not have any other symptoms or concerns.      Jose Haywood DO  Board Certified in Internal Medicine and Sleep Medicine  [facility].    (Note created with Dragon voice recognition and unintended spelling errors and word substitutions may occur)

## 2021-05-31 VITALS — WEIGHT: 211.5 LBS | BODY MASS INDEX: 35.2 KG/M2

## 2021-05-31 VITALS — WEIGHT: 220.1 LBS | BODY MASS INDEX: 36.67 KG/M2 | HEIGHT: 65 IN

## 2021-06-01 VITALS — BODY MASS INDEX: 36.32 KG/M2 | WEIGHT: 218.25 LBS

## 2021-06-01 VITALS — HEIGHT: 65 IN | BODY MASS INDEX: 36.65 KG/M2 | WEIGHT: 220 LBS

## 2021-06-02 VITALS — BODY MASS INDEX: 33.84 KG/M2 | WEIGHT: 203.1 LBS | HEIGHT: 65 IN

## 2021-06-02 VITALS — HEIGHT: 66 IN | BODY MASS INDEX: 33.33 KG/M2 | WEIGHT: 207.4 LBS

## 2021-06-02 VITALS — BODY MASS INDEX: 35.25 KG/M2 | WEIGHT: 211.6 LBS | HEIGHT: 65 IN

## 2021-06-02 VITALS — WEIGHT: 206.8 LBS | HEIGHT: 66 IN | BODY MASS INDEX: 33.23 KG/M2

## 2021-06-02 VITALS — BODY MASS INDEX: 35.16 KG/M2 | HEIGHT: 65 IN | WEIGHT: 211 LBS

## 2021-06-02 VITALS — WEIGHT: 210 LBS | BODY MASS INDEX: 34.95 KG/M2

## 2021-06-02 NOTE — PROGRESS NOTES
Dear Dr. Parikh, Cheryl Bernardo MD  [address]  [address]  [place] [address],    Thank you for the opportunity to participate in the care of Dayday Silva.     He is a 54 y.o. y/o male patient who comes to the sleep medicine clinic for follow up.  The patient underwent a repeat sleep study on 07/17/2019 which showed that he still had obstructive sleep apnea with an apnea hypopnea index of 16.4 events per hour with the lowest O2 sat of 79%.  The patient was informed of the results and initiated on CPAP therapy again.  Since he has resume CPAP therapy his sleep quality and energy level have improved.    Compliance Download data for 30 days:  Pressure setting: APAP 7-15 CWP  Residual AHI: 2.3 events per hour  Leak: Minimal  Compliance: 100%  Mask Tolerance: Good  Skin irritation: None    Past Medical History:   Diagnosis Date     Allergic Rhinitis      Cancer (H)     Prostate     Chronic Major Depression      Class 2 obesity due to excess calories with serious comorbidity and body mass index (BMI) of 36.0 to 36.9 in adult     Created by Conversion      Lower Back Pain Chronic      Obesity      Schizoaffective disorder (H) 6/24/2015       Past Surgical History:   Procedure Laterality Date     HEMORRHOID SURGERY       DC LAP,PROSTATECTOMY,RADICAL,W/NERVE SPARE,INCL ROBOTIC Bilateral 12/7/2016    Procedure: DA ETHAN RADICAL RETROPUBIC PROSTATECTOMY, BILATERAL PELVIC LYMPH NODE DISSECTION ;  Surgeon: Ulisses French MD;  Location: [facility];  Service: Urology     PROSTATE BIOPSY       PROSTATECTOMY  12/07/2016       Social History     Socioeconomic History     Marital status: Single     Spouse name: Not on file     Number of children: Not on file     Years of education: Not on file     Highest education level: Not on file   Occupational History     Occupation:      Comment: [employer]   Social Needs     Financial resource strain: Not on file     Food insecurity:     Worry: Not on file      Inability: Not on file     Transportation needs:     Medical: Not on file     Non-medical: Not on file   Tobacco Use     Smoking status: Former Smoker     Last attempt to quit: 2016     Years since quitting: 3.7     Smokeless tobacco: Former User   Substance and Sexual Activity     Alcohol use: No     Drug use: No     Sexual activity: Never   Lifestyle     Physical activity:     Days per week: Not on file     Minutes per session: Not on file     Stress: Not on file   Relationships     Social connections:     Talks on phone: Not on file     Gets together: Not on file     Attends Lutheran service: Not on file     Active member of club or organization: Not on file     Attends meetings of clubs or organizations: Not on file     Relationship status: Not on file     Intimate partner violence:     Fear of current or ex partner: Not on file     Emotionally abused: Not on file     Physically abused: Not on file     Forced sexual activity: Not on file   Other Topics Concern     Not on file   Social History Narrative     Not on file       Current Outpatient Medications   Medication Sig Dispense Refill     ARIPiprazole (ABILIFY) 30 MG tablet Take 30 mg by mouth daily.       aspirin 81 MG EC tablet Take 1 tablet (81 mg total) by mouth daily. 120 tablet 3     diaper,brief,adult,disposable Misc Use 1 each As Directed continuous as needed. 120 each 5     disposable gloves Misc Use as needed. 100 each 3     FLUoxetine (PROZAC) 40 MG capsule Take 40 mg by mouth daily.       hydrOXYzine HCl (ATARAX) 25 MG tablet Take 25 mg by mouth 3 (three) times a day as needed for itching.       ketotifen (ZADITOR/ZYRTEC ITCHY EYES) 0.025 % (0.035 %) ophthalmic solution INSTILL ONE DROP IN BOTH EYES UP TO TWICE DAILY AS NEEDED **12 TOTAL FILLS* 5 mL 10     loratadine (CLARITIN) 10 mg tablet Take 1 tablet (10 mg total) by mouth daily. 100 tablet 3     LORazepam (ATIVAN) 0.5 MG tablet TAKE 2 TABLETS (1MG) BY MOUTH EVERY 8 HOURS AS NEEDED FOR ANXIETY  "OR SLEEP **1 TOTAL FILL* 60 tablet 1     mirabegron (MYRBETRIQ) 50 mg Tb24 ER tablet Take 50 mg by mouth daily.       multivitamin with folic acid (ONE DAILY MULTIVITAMIN) 400 mcg Tab Take 1 tablet by mouth daily. 90 tablet 3     polyethylene glycol (MIRALAX) 17 gram packet DISSOLVE 1 PACKET INTO LIQUID AND DRINK BY MOUTH EVERY OTHER DAY;INCREASE TO ONCE DAILY DOSE IF CONSTIPATED. 14 each 11     trolamine salicylate-aloe vera (ASPERCREME WITH ALOE) 10 % Crea Apply to left shoulder area 3x daily as needed. 1 Tube 2     No current facility-administered medications for this visit.        No Known Allergies    Epworths Sleepiness Scale 10/18/2018 1/25/2019 7/3/2019 10/16/2019   Sitting and reading 2 2 2 1   Watching TV 3 2 1 2   Sitting, inactive in a public place (e.g. a theatre or a meeting) 2 3 3 2   As a passenger in a car for an hour without a break 3 2 2 2   Lying down to rest in the afternoon when circumstances permit 1 1 3 2   Sitting and talking to someone 0 2 0 0   Sitting quietly after a lunch without alcohol 1 1 1 1   In a car, while stopped for a few minutes in traffic 0 1 0 1   Total score 12 14 12 11       Physical Exam:  /70 (Patient Site: Right Arm, Patient Position: Sitting, Cuff Size: Adult Large)   Pulse 78   Ht 5' 6\" (1.676 m)   Wt (!) 228 lb (103.4 kg)   SpO2 96%   BMI 36.80 kg/m    BMI:Body mass index is 36.8 kg/m .   GEN: NAD, obese  Psych: normal mood, normal affect    Labs/Studies:    I reviewed the efficacy and compliance report from his device. Data summarized on the HPI and the PAP compliance flow sheet.       Assessment and Plan:  In summary Dayday Silva is a 54 y.o. year old male who is here for follow-up.    1.  Obstructive sleep apnea on CPAP  I reviewed the results of the patient's sleep study with him.  I congratulated the patient on his excellent CPAP usage.  I welcome him to follow-up with me every 2 years.  2.  Hypersomnia  Improving     Patient verbalized " understanding of these issues, agrees with the plan and all questions were answered today. Patient was given an opportuntity to voice any other symptoms or concerns not listed above. Patient did not have any other symptoms or concerns.      Jose Haywood DO  Board Certified in Internal Medicine and Sleep Medicine  [facility].    (Note created with Dragon voice recognition and unintended spelling errors and word substitutions may occur)

## 2021-06-03 VITALS — BODY MASS INDEX: 35.52 KG/M2 | WEIGHT: 221 LBS | HEIGHT: 66 IN

## 2021-06-03 VITALS
WEIGHT: 228 LBS | HEART RATE: 78 BPM | HEIGHT: 66 IN | DIASTOLIC BLOOD PRESSURE: 70 MMHG | SYSTOLIC BLOOD PRESSURE: 116 MMHG | OXYGEN SATURATION: 96 % | BODY MASS INDEX: 36.64 KG/M2

## 2021-06-04 VITALS
DIASTOLIC BLOOD PRESSURE: 60 MMHG | HEIGHT: 65 IN | RESPIRATION RATE: 20 BRPM | HEART RATE: 88 BPM | TEMPERATURE: 98 F | BODY MASS INDEX: 38.17 KG/M2 | SYSTOLIC BLOOD PRESSURE: 106 MMHG | WEIGHT: 229.13 LBS

## 2021-06-04 VITALS
SYSTOLIC BLOOD PRESSURE: 112 MMHG | RESPIRATION RATE: 12 BRPM | HEIGHT: 65 IN | DIASTOLIC BLOOD PRESSURE: 68 MMHG | WEIGHT: 228.6 LBS | HEART RATE: 88 BPM | BODY MASS INDEX: 38.09 KG/M2

## 2021-06-05 NOTE — PROGRESS NOTES
Assessment and Plan:     1. Routine general medical examination at a health care facility  Healthy Male routine Medicare exam completed today.  I discussed preventative measures with the patient including continuing with lifestyle modifications of a balanced diet and regular cardiovascular exercise.  I discussed self testicular exams and how to complete these.  He follows up with urology for routine prostate exams.  Colonoscopy is up-to-date.  I encouraged patient to follow-up yearly for annual physicals and sooner as needed.      2. Schizoaffective disorder, unspecified type (H)  3. Mild cognitive disorder  He lives in a group home setting and is managed with psychiatry.  He has a  along with the  that accompanies patient to visits.  Mom is his legal guardian.  He will be starting a new day program.  Paperwork required to be completed today for patient to start.  - Basic Metabolic Panel      4. Morbid obesity (H)  Reviewed recommendations of regular exercise and eating a balanced diet and avoiding increased caloric intake above needs.    5. Prostate cancer (H)  Follows with urology has urinary incontinence second Gilbert to this as well as prior.  Prostate surgery was in 2016 he has regular follow-ups.    6. Recurrent major depressive disorder, in full remission (H)  Doing well with management of his depression continues to follow with psychiatry on a regular basis.  Prozac seems to be a good medication for him.    7. Urinary incontinence, unspecified type  This was present prior to as well as more prevalent after prostate cancer surgery.  He requires use of checks and depends due to chronic incontinence.    8. Lipid screening  Completed today  - Lipid Loup RANDOM    9. Bilateral impacted cerumen  Bilateral ears were cleaned of impacted cerumen utilizing lighted speculum as well as lavage.  I personally completed both of these procedures on patient he tolerated well and canals  were clear following procedure.    The patient's current medical problems were reviewed.    The following are part of a depression follow up plan for the patient:  under care of mental health team  The following high BMI interventions were performed this visit: encouragement to exercise, dietary management education, guidance, and counseling and lifestyle education regarding diet  The following health maintenance schedule was reviewed with the patient and provided in printed form in the after visit summary:   Health Maintenance   Topic Date Due     HEPATITIS C SCREENING  1965     ZOSTER VACCINES (1 of 2) 03/28/2014     MEDICARE ANNUAL WELLNESS VISIT  01/15/2020     COLONOSCOPY  09/19/2021     LIPID  01/15/2024     ADVANCE CARE PLANNING  01/15/2024     TD 18+ HE  07/29/2025     DEPRESSION ACTION PLAN  Completed     HIV SCREENING  Completed     INFLUENZA VACCINE RULE BASED  Completed     TDAP ADULT ONE TIME DOSE  Completed        Subjective:   Chief Complaint: Dayday Silva is an 54 y.o. male here for an Annual Wellness visit.   HPI:  He is here today with the supervisor of his group home. No other questions or concerns today. In general doing well. He continues to work a part time job at [employer] Same symptoms of urinary incontenence which has been the same since the prostate surgery. Has had some weight gain. He is doing well on prozac and trazodone. He follows up with his psychiatrist every 3 months who manages his medications.     Review of Systems:  Please see above.  The rest of the review of systems are negative for all systems.    Patient Care Team:  Cheryl Parikh MD as PCP - General (Family Medicine)  Cheryl Parikh MD as Assigned PCP     Patient Active Problem List   Diagnosis     Chronic Major Depression     Obstructive sleep apnea syndrome     Lower Back Pain Chronic     Allergic Rhinitis     Varicose Veins     Schizoaffective disorder (H)     Myopia, unspecified laterality      Left anterior shoulder pain     Hypophosphatemia     Trouble in sleeping     Urinary incontinence     Prostate cancer (H)     Mild cognitive disorder     Morbid obesity (H)     Past Medical History:   Diagnosis Date     Allergic Rhinitis      Cancer (H)     Prostate     Chronic Major Depression      Class 2 obesity due to excess calories with serious comorbidity and body mass index (BMI) of 36.0 to 36.9 in adult     Created by Conversion      Lower Back Pain Chronic      Obesity      Schizoaffective disorder (H) 2015      Past Surgical History:   Procedure Laterality Date     HEMORRHOID SURGERY       SD LAP,PROSTATECTOMY,RADICAL,W/NERVE SPARE,INCL ROBOTIC Bilateral 2016    Procedure: DA ETHAN RADICAL RETROPUBIC PROSTATECTOMY, BILATERAL PELVIC LYMPH NODE DISSECTION ;  Surgeon: Ulisses French MD;  Location: [facility];  Service: Urology     PROSTATE BIOPSY       PROSTATECTOMY  2016      Family History   Problem Relation Age of Onset     Prostate cancer Paternal Grandfather      No Medical Problems Mother      Cancer Father         lung     Colon cancer Paternal Aunt      Diabetes Maternal Grandfather      Breast cancer Neg Hx      Heart disease Neg Hx       Social History     Socioeconomic History     Marital status: Single     Spouse name: Not on file     Number of children: Not on file     Years of education: Not on file     Highest education level: Not on file   Occupational History     Occupation:      Comment: [employer]   Social Needs     Financial resource strain: Not on file     Food insecurity:     Worry: Not on file     Inability: Not on file     Transportation needs:     Medical: Not on file     Non-medical: Not on file   Tobacco Use     Smoking status: Former Smoker     Last attempt to quit: 2016     Years since quittin.0     Smokeless tobacco: Former User   Substance and Sexual Activity     Alcohol use: No     Drug use: No     Sexual activity: Never   Lifestyle      Physical activity:     Days per week: Not on file     Minutes per session: Not on file     Stress: Not on file   Relationships     Social connections:     Talks on phone: Not on file     Gets together: Not on file     Attends Anabaptism service: Not on file     Active member of club or organization: Not on file     Attends meetings of clubs or organizations: Not on file     Relationship status: Not on file     Intimate partner violence:     Fear of current or ex partner: Not on file     Emotionally abused: Not on file     Physically abused: Not on file     Forced sexual activity: Not on file   Other Topics Concern     Not on file   Social History Narrative     Not on file      Current Outpatient Medications   Medication Sig Dispense Refill     ARIPiprazole (ABILIFY) 30 MG tablet Take 30 mg by mouth daily.       aspirin 81 MG EC tablet Take 1 tablet (81 mg total) by mouth daily. 120 tablet 3     diaper,brief,adult,disposable Misc Use 1 each As Directed continuous as needed. 120 each 5     disposable gloves Misc Use as needed. 100 each 3     FLUoxetine (PROZAC) 40 MG capsule Take 40 mg by mouth daily.       ketotifen (ZADITOR/ZYRTEC ITCHY EYES) 0.025 % (0.035 %) ophthalmic solution INSTILL ONE DROP IN BOTH EYES UP TO TWICE DAILY AS NEEDED **12 TOTAL FILLS* 5 mL 10     loratadine (CLARITIN) 10 mg tablet Take 1 tablet (10 mg total) by mouth daily. 100 tablet 3     LORazepam (ATIVAN) 0.5 MG tablet TAKE 2 TABLETS (1MG) BY MOUTH EVERY 8 HOURS AS NEEDED FOR ANXIETY OR SLEEP **1 TOTAL FILL* 60 tablet 1     mirabegron (MYRBETRIQ) 50 mg Tb24 ER tablet Take 50 mg by mouth daily.       multivitamin with folic acid (ONE DAILY MULTIVITAMIN) 400 mcg Tab Take 1 tablet by mouth daily. 90 tablet 3     polyethylene glycol (MIRALAX) 17 gram packet DISSOLVE 1 PACKET INTO LIQUID AND DRINK BY MOUTH EVERY OTHER DAY;INCREASE TO ONCE DAILY DOSE IF CONSTIPATED. 14 each 11     trolamine salicylate-aloe vera (ASPERCREME WITH ALOE) 10 % Crea Apply  "to left shoulder area 3x daily as needed. 1 Tube 2     hydrOXYzine HCl (ATARAX) 25 MG tablet Take 25 mg by mouth 3 (three) times a day as needed for itching.       traZODone (DESYREL) 50 MG tablet        No current facility-administered medications for this visit.       Objective:   Vital Signs:   Visit Vitals  /68 (Patient Site: Left Arm, Patient Position: Sitting, Cuff Size: Adult Regular)   Pulse 88   Resp 12   Ht 5' 5.12\" (1.654 m)   Wt (!) 228 lb 9.6 oz (103.7 kg)   BMI 37.90 kg/m         VisionScreening:  No exam data present     PHYSICAL EXAM  General Appearance:    Alert, cooperative, no distress, appears stated age   Head:    Normocephalic, without obvious abnormality, atraumatic   Eyes:    PERRL, conjunctiva/corneas clear, EOM's intact both eyes       Ears:   Cerumen impaction bilaterally, removed with curette and water lavage; following cerumen impaction removal TMs  visualized and within normal limits.   Nose:   Nares normal, septum midline, mucosa normal, no drainage    or sinus tenderness   Throat:   Lips, mucosa, and tongue normal; teeth and gums normal   Neck:   Supple, symmetrical, trachea midline, no adenopathy;        thyroid:  No enlargement/tenderness/nodules; no carotid    bruit or JVD   Back:     Symmetric, no curvature, ROM normal, no CVA tenderness   Lungs:     Clear to auscultation bilaterally, respirations unlabored   Chest wall:    No tenderness or deformity   Heart:    Regular rate and rhythm, S1 and S2 normal, no murmur, rub    or gallop   Abdomen:     Soft, non-tender, bowel sounds active all four quadrants,     no masses, no organomegaly   Genitalia:    Deferred   Rectal:    Not performed   Extremities:   Extremities normal, atraumatic, no cyanosis or edema   Pulses:   2+ and symmetric all extremities   Skin:   Skin color, texture, turgor normal, no rashes or lesions   Lymph nodes:   Cervical, supraclavicular, and axillary nodes normal   Neurologic:   CNII-XII intact. Normal " strength, sensation and reflexes       throughout         Assessment Results 1/9/2020   Activities of Daily Living No help needed   Instrumental Activities of Daily Living 2-4 - Moderate impairment   Mini Cog Total Score 3   Some recent data might be hidden     A Mini-Cog score of 0-2 suggests the possibility of dementia, score of 3-5 suggests no dementia    Identified Health Risks:     The patient reports that he has difficulty with instrumental activities of daily living.  He was provided with a list of local organizations that provide support services and advised to make a follow up appointment in as needed to address this further. He lives in a group home and has support services he requires.   Information on urinary incontinence and treatment options given to patient.  Information regarding advance directives (living [place]), including where he can download the appropriate form, was provided to the patient via the AVS.

## 2021-06-06 NOTE — TELEPHONE ENCOUNTER
Day program phone number: [phone],  states she was coughing when they answered her call (Dx bronchitis) and this is in fear of the zuniga virus. Vivi states she has not traveled outside of the [place] and neither has anyone on staff or residents of the home.     I left a detailed message at the day program for someone to return our call with details of their concern or why Dayday wouldve been quarantined for 14 days due to isolated incident of diarrhea and that Marks provider needs to know the concern she is responding to, what is she clearing him of (illness)

## 2021-06-06 NOTE — TELEPHONE ENCOUNTER
RN triage   Call from Vivi - manager at pt group home   Pt had diarrhea x1 this AM -- no further diarrhea -- no abd pain - no vomiting - no fever -- pt eating well today   Pt did not go to his day program today due to diarrhea this AM   Vivi has cough / ' bronchitis' - and when she called the day program to tell them that he will not be coming today they told her that he needs to quarantine himself for 2 weeks before returning to day program or get tested or get note from PCP that he can return to day program ---   Vivi states that pt does not have cough or cold or fever -- pt is not sick   Vivi thinks his 1x diarrhea may be due to pt possibly eating chocolate -- has hx of not tolerating chocolate well   Vivi requesting note to return to day program --   Please advise   Stella Wilson RN [facility] RN triage        Reason for Disposition    Nursing judgment    Protocols used: NO PROTOCOL AVAILABLE - SICK ADULT-A-[place]

## 2021-06-06 NOTE — TELEPHONE ENCOUNTER
I don't understand why quarantine would be needed without a concerning travel history.  Is this for Coronavirus?  If a letter is needed, I think I would need to see the patient to gather more info.  Alternatively, the day program could fax me information about what is needed, but unless someone has traveled internationally, this should not be an issue.

## 2021-06-06 NOTE — PROGRESS NOTES
.Assessment / Impression     1. Diarrhea, unspecified type     2. Exposure to viral disease           Plan:     It is reassuring that his diarrhea symptoms have improved.  No further treatment is necessary for this.  It is reassuring that he has not developed any symptoms of a respiratory illness since being exposed to his group home staff member who has had another round of chronic bronchitis.  The staff member was present today and told me that she has not done any recent traveling and that this is been a longstanding issue for her.  She admits that her long history of smoking has likely contributed to this over the years.  I wrote a letter today stating that he may return to the day program at the [facility].    Subjective:      HPI: Dayday Silva is a 54 y.o. male who presents to the clinic with a group home staff member to be medically cleared to return to the the day program at the [facility].  He had diarrhea on 3/6/2020.  When the group home staff member called to let the day program know they heard that she was coughing and recommended that he be quarantined for 14 days due to concerns about Covid-19.  The staff member has a long history of tobacco abuse and chronic bronchitis symptoms.  She is not having fevers.  She has not traveled.  She reports that her symptoms are improving.  He denies feeling ill.  He is not having fevers, sweats or chills.  He is not coughing or short of breath.  He is not congested.        Review of Systems  All other systems reviewed and are negative.     Social History     Tobacco Use   Smoking Status Former Smoker     Last attempt to quit: 2016     Years since quittin.1   Smokeless Tobacco Former User       Family History   Problem Relation Age of Onset     Prostate cancer Paternal Grandfather      No Medical Problems Mother      Cancer Father         lung     Colon cancer Paternal Aunt      Diabetes Maternal Grandfather      Breast cancer Neg Hx      Heart disease  "Neg Hx        Objective:     /60 (Patient Site: Right Arm, Patient Position: Sitting, Cuff Size: Adult Large)   Pulse 88   Temp 98  F (36.7  C) (Oral)   Resp 20   Ht 5' 5.12\" (1.654 m)   Wt (!) 229 lb 2 oz (103.9 kg)   BMI 37.99 kg/m    Physical Examination: General appearance - alert, well appearing, and in no distress  Eyes: pupils equal and reactive, extraocular eye movements intact  Mouth: mucous membranes moist, pharynx normal without lesions  Neck: supple, no significant adenopathy  Lungs: clear to auscultation, no wheezes, rales or rhonchi, symmetric air entry  Heart: normal rate, regular rhythm, normal S1, S2, no murmurs, rubs, clicks or gallops  Abdomen: soft, nontender, nondistended, no masses or organomegaly  Neurological: alert, oriented, normal speech, no focal findings or movement disorder noted.    Extremities: No edema, no clubbing or cyanosis  Psychiatric: Normal affect. Does not appear anxious or depressed.    No results found for this or any previous visit (from the past 168 hour(s)).    Current Outpatient Medications   Medication Sig     ARIPiprazole (ABILIFY) 30 MG tablet Take 30 mg by mouth daily.     aspirin 81 MG EC tablet Take 1 tablet (81 mg total) by mouth daily.     diaper,brief,adult,disposable Misc Use 1 each As Directed continuous as needed.     disposable gloves Misc Use as needed.     FLUoxetine (PROZAC) 40 MG capsule Take 40 mg by mouth daily.     hydrOXYzine HCl (ATARAX) 25 MG tablet Take 25 mg by mouth 3 (three) times a day as needed for itching.     ketotifen (ZADITOR/ZYRTEC ITCHY EYES) 0.025 % (0.035 %) ophthalmic solution INSTILL ONE DROP IN BOTH EYES UP TO TWICE DAILY AS NEEDED **12 TOTAL FILLS*     loratadine (CLARITIN) 10 mg tablet Take 1 tablet (10 mg total) by mouth daily.     LORazepam (ATIVAN) 0.5 MG tablet TAKE 2 TABLETS (1MG) BY MOUTH EVERY 8 HOURS AS NEEDED FOR ANXIETY OR SLEEP **1 TOTAL FILL*     mirabegron (MYRBETRIQ) 50 mg Tb24 ER tablet Take 50 mg by " mouth daily.     multivitamin with folic acid (ONE DAILY MULTIVITAMIN) 400 mcg Tab Take 1 tablet by mouth daily.     polyethylene glycol (MIRALAX) 17 gram packet DISSOLVE 1 PACKET INTO LIQUID AND DRINK BY MOUTH EVERY OTHER DAY;INCREASE TO ONCE DAILY DOSE IF CONSTIPATED.     trolamine salicylate-aloe vera (ASPERCREME WITH ALOE) 10 % Crea Apply to left shoulder area 3x daily as needed.

## 2021-06-09 NOTE — TELEPHONE ENCOUNTER
If he has medicare he shouldn't need a referral.  If it's close by, he can use Dr. Canchola in our building.

## 2021-06-09 NOTE — TELEPHONE ENCOUNTER
Referral Requ  Type of referral:  Podiatry referral   Who s requesting: Group Home: Vivi, manager of group home [phone]  Why the request:  In grown toe nails  Have you been seen for this request: N/A  Does patient have a preference on a group/provider?  [facility] works with insurance  Okay to leave a detailed message?  Yes    Group home would like to know first if PCP can writer referral for patient to be seen in podiatry before she schedules PCP appointment to discuss, writer also advised patient can call insurance to see if they can do self referral for specialty.

## 2021-06-10 NOTE — PATIENT INSTRUCTIONS - HE
POSTOPERATIVE INSTRUCTIONS AFTER CHEMICAL NAIL REMOVAL SURGERY    What to expect after surgery:  Your toe will be numb for around 2-8 hours after your nail procedure due to the shots that were given.  Expect some degree of soreness in your toe later today when the numbness wears off.  Rest, elevation and ice applied at the ankle will help ease the pain. Your bandage was wrapped snug to cut down on bleeding.    This may feel tight when the numbness wears off.  Please remove the bandage tomorrow morning and begin the foot soaks described below.  Warm water will feel good and helps to ease the pain  How to Care for Your Toe After Surgery  One daily foot soak will be necessary to heal properly.  Chemicals were used to kill the root of the nail.  Expect local redness, drainage and tenderness , this will last for 6-8 weeks.  Soaking helps loosen the scab and dried drainage.  Failure to soak leads to a hard scab that blocks drainage.  Back up drainage increases the pain and the scab may need to be removed with another office procedure.  You will heal much quicker and be more comfortable if you are consistent with local wound care and foot soaks.  Soak one time every day for 2 weeks.  Soaks should last 10 minutes.  Soak after taking a shower to get the germs out.  Soak in warm soapy water.  A small amount of bleeding may be noted which is normal.   Clean the surgical site with a Q-tip during each soak.  Dip the Q-tip in the water and gently clean away any crusted drainage.  The area may be tender but you will not harm anything with the Q-tip.  Pat the area dry and allow a few minutes to air dry before applying any bandages.  Flexible fabric style band aids work best.  In general, the area will be wet from drainage.  Watch out for excessive moisture.  White and wrinkled skin is a sign of too much moisture and that the skin needs to be dried out. It is ok to allow the toe some air by removing the band aid as  needed.  Activity  Feel free to do normal activities as tolerated on the following day.  Wear open toe sandals if regular shoes are not comfortable.  Avoid shoes that are tight on the toe.  Medications   Finish any antibiotics if they have been prescribed.  Tylenol or ibuprofen may be used as needed for pain.  Icing and elevation also help with pain and swelling.  Risks  Watch for signs of infection.  It is normal to see redness, local tenderness, and drainage around the nail area for up to 8 weeks after permanent nail removal surgery.  Call the clinic at [phone] if you see red streaks spreading up the toe, foot, or leg.  Fever and chills are also concerning and you should notify the clinic if you are having these symptoms.  If these symptoms occur when the clinic is closed, please go to urgent care.  How Well Does Permanent Nail Surgery Work?  Permanent nail surgery means that we intend that the nail problem will not come back.  In a small percentage of patients, nail problems return in about 6 months to a year.  We would like to see you back in the office if you are experiencing problems in the future.  Please call [phone] with any additional questions.

## 2021-06-10 NOTE — PROGRESS NOTES
"FOOT AND ANKLE SURGERY/PODIATRY Progress Note        ASSESSMENT:   Ingrown Nail       TREATMENT:  -There is an ingrown nail on the medial border right hallux and lateral border left hallux, no signs of infection. We discussed both temporary and permanent nail removal today. Because he has had a temporary nail avulsion procedure performed in the past, I recommend a permanent nail avulsion today. He understands that the nail may become symptomatic in 2% of cases. She consents to the procedure today.  -5 cc of 1% lidocaine plain was injected into the bilateral hallux. The digits were cleansed with betadine swab. Following the application of a digital Tourni-cot the following procedures were performed.  Attention was directed to the medial border of the right hallux and lateral border left hallux toenails where utilizing an [place] elevator and an English anvil nail splitter the nail was split from distal to proximal to the level of the epionychium.  Next the offending border was removed.  3,30 second applications of phenol were applied to the matrix.  The wound was then flushed with copious amounts of alcohol. The Tourni-cot was removed. A dressing was applied comprising of bacitracin 2x2 and 1\" coban wrap.  The tourniquet was removed and normal color returned.    -Post-op instructions were dispensed. All questions invited and answered.   -I would like to see the patient again in one week for follow-up.     Patrice Washington DPM  [facility]      HPI: I was asked to see Dayday ONOFRE Ricardo today by Dr. Parikh for ingrown nails bilateral hallux. The patient's aid is present today and is assisting with the history. Patient admits having the ingrown nails removed in the past, approximately 10 years ago. He is experiencing pain with walking and would like to have the offending nail borders permanently removed.      Past Medical History:   Diagnosis Date     Allergic Rhinitis      Cancer (H)     " Prostate     Chronic Major Depression      Class 2 obesity due to excess calories with serious comorbidity and body mass index (BMI) of 36.0 to 36.9 in adult     Created by Conversion      Lower Back Pain Chronic      Obesity      Schizoaffective disorder (H) 6/24/2015       Past Surgical History:   Procedure Laterality Date     HEMORRHOID SURGERY       NC LAP,PROSTATECTOMY,RADICAL,W/NERVE SPARE,INCL ROBOTIC Bilateral 12/7/2016    Procedure: DA ETHAN RADICAL RETROPUBIC PROSTATECTOMY, BILATERAL PELVIC LYMPH NODE DISSECTION ;  Surgeon: Ulisses French MD;  Location: [facility];  Service: Urology     PROSTATE BIOPSY       PROSTATECTOMY  12/07/2016       No Known Allergies      Current Outpatient Medications:      acetaminophen (TYLENOL) 500 MG tablet, Take 500-1,000 mg by mouth., Disp: , Rfl:      ARIPiprazole (ABILIFY) 30 MG tablet, Take 30 mg by mouth daily., Disp: , Rfl:      aspirin 81 MG EC tablet, Take 1 tablet (81 mg total) by mouth daily., Disp: 120 tablet, Rfl: 3     diaper,brief,adult,disposable Misc, Use 1 each As Directed continuous as needed., Disp: 120 each, Rfl: 5     disposable gloves Misc, Use as needed., Disp: 100 each, Rfl: 3     FLUoxetine (PROZAC) 40 MG capsule, Take 40 mg by mouth daily., Disp: , Rfl:      gabapentin (NEURONTIN) 100 MG capsule, , Disp: , Rfl:      ketotifen (ZADITOR/ZYRTEC ITCHY EYES) 0.025 % (0.035 %) ophthalmic solution, INSTILL ONE DROP IN BOTH EYES UP TO TWICE DAILY AS NEEDED **12 TOTAL FILLS*, Disp: 5 mL, Rfl: 10     loratadine (CLARITIN) 10 mg tablet, Take 1 tablet (10 mg total) by mouth daily., Disp: 100 tablet, Rfl: 3     LORazepam (ATIVAN) 0.5 MG tablet, TAKE 2 TABLETS (1MG) BY MOUTH EVERY 8 HOURS AS NEEDED FOR ANXIETY OR SLEEP **1 TOTAL FILL*, Disp: 60 tablet, Rfl: 1     mirabegron (MYRBETRIQ) 50 mg Tb24 ER tablet, Take 50 mg by mouth daily., Disp: , Rfl:      miscellaneous medical supply Misc, at bedtime, Disp: , Rfl:      multivitamin with folic acid (ONE  DAILY MULTIVITAMIN) 400 mcg Tab, Take 1 tablet by mouth daily., Disp: 90 tablet, Rfl: 3     polyethylene glycol (MIRALAX) 17 gram packet, DISSOLVE 1 PACKET INTO LIQUID AND DRINK BY MOUTH EVERY OTHER DAY;INCREASE TO ONCE DAILY DOSE IF CONSTIPATED., Disp: 14 each, Rfl: 11     TOVIAZ 4 mg Tb24 ER tablet, , Disp: , Rfl:      trolamine salicylate-aloe vera (ASPERCREME WITH ALOE) 10 % Crea, Apply to left shoulder area 3x daily as needed., Disp: 1 Tube, Rfl: 2    Family History   Problem Relation Age of Onset     Prostate cancer Paternal Grandfather      No Medical Problems Mother      Cancer Father         lung     Colon cancer Paternal Aunt      Diabetes Maternal Grandfather      Breast cancer Neg Hx      Heart disease Neg Hx        Social History     Socioeconomic History     Marital status: Single     Spouse name: Not on file     Number of children: Not on file     Years of education: Not on file     Highest education level: Not on file   Occupational History     Occupation:      Comment: [employer]   Social Needs     Financial resource strain: Not on file     Food insecurity     Worry: Not on file     Inability: Not on file     Transportation needs     Medical: Not on file     Non-medical: Not on file   Tobacco Use     Smoking status: Former Smoker     Last attempt to quit: 2016     Years since quittin.5     Smokeless tobacco: Former User   Substance and Sexual Activity     Alcohol use: No     Drug use: No     Sexual activity: Never   Lifestyle     Physical activity     Days per week: Not on file     Minutes per session: Not on file     Stress: Not on file   Relationships     Social connections     Talks on phone: Not on file     Gets together: Not on file     Attends [employer] service: Not on file     Active member of club or organization: Not on file     Attends meetings of clubs or organizations: Not on file     Relationship status: Not on file     Intimate partner violence     Fear of current or ex  partner: Not on file     Emotionally abused: Not on file     Physically abused: Not on file     Forced sexual activity: Not on file   Other Topics Concern     Not on file   Social History Narrative     Not on file       10 point Review of Systems is negative except for ingrown nails.     There were no vitals filed for this visit.    BMI= There is no height or weight on file to calculate BMI.      OBJECTIVE:  Appearance: alert, well appearing, and in no distress.    There were no vitals filed for this visit.    Vascular: Dorsalis pedis and posterior tibial pulses are palpable. There is pedal hair growth.  CFT < 3 sec from anterior tibial surface to distal digits bilaterally. There is no appreciable edema noted.  Dermatologic: Incurvated nail border along the lateral left hallux and medial right hallux, no erythema.   Neurologic: All epicritic and proprioceptive sensations are grossly intact bilaterally.   Musculoskeletal: Mild pain medial border right hallux and lateral border left hallux.     Imaging: None

## 2021-06-10 NOTE — PROGRESS NOTES
"Dayday Silva is a 55 y.o. male who is being evaluated via a billable video visit.      The patient has been notified of following:     \"This video visit will be conducted via a call between you and your physician/provider. We have found that certain health care needs can be provided without the need for an in-person physical exam.  This service lets us provide the care you need with a video conversation.  If a prescription is necessary we can send it directly to your pharmacy.  If lab work is needed we can place an order for that and you can then stop by our lab to have the test done at a later time.    Video visits are billed at different rates depending on your insurance coverage. Please reach out to your insurance provider with any questions.    If during the course of the call the physician/provider feels a video visit is not appropriate, you will not be charged for this service.\"    Patient has given verbal consent to a Video visit? Yes  How would you like to obtain your AVS? AVS Preference: Mail a copy.  If dropped by the video visit, the video invitation should be sent to: Text to cell phone: [phone]  Will anyone else be joining your video visit? No          Additional nursing notes: patient has no concerns about his toes post nail avulsion.      Video-Visit Details    Type of service:  Video Visit    Originating Location (pt. Location): group [place]    Distant Location (provider location):  [place] [facility]     Platform used for Video Visit: Monae Hernandez RN  "

## 2021-06-10 NOTE — PROGRESS NOTES
FOOT AND ANKLE SURGERY/PODIATRY Progress Note      ASSESSMENT:   S/p Nail avulsion     Type of service:  Video Visit       Video Start and End Time : 1:41/1:45    Originating Location (pt. Location):  care center      Distant Location :  [facility]         Mode of Communication: Jerzy,     TREATMENT:  -Based on today's video visit, no signs of infection is noted today. He appears to be doing well. I recommend he continue to soak qday in soapy water x5 minutes until drainage ceases.     -All questions invited and answered. He is discharged at this time and will follow-up with me as concerns develop.       EVERTON George [facility]      HPI: Dayday Silva was seen again today s/p permanent nail avulsion bilateral hallux. He has mild pain and has continued to soak the hallux nails as directed.       Past Medical History:   Diagnosis Date     Allergic Rhinitis      Cancer (H)     Prostate     Chronic Major Depression      Class 2 obesity due to excess calories with serious comorbidity and body mass index (BMI) of 36.0 to 36.9 in adult     Created by Conversion      Lower Back Pain Chronic      Obesity      Schizoaffective disorder (H) 6/24/2015       Past Surgical History:   Procedure Laterality Date     HEMORRHOID SURGERY       IA LAP,PROSTATECTOMY,RADICAL,W/NERVE SPARE,INCL ROBOTIC Bilateral 12/7/2016    Procedure: DA ETHAN RADICAL RETROPUBIC PROSTATECTOMY, BILATERAL PELVIC LYMPH NODE DISSECTION ;  Surgeon: Ulisses French MD;  Location: [facility];  Service: Urology     PROSTATE BIOPSY       PROSTATECTOMY  12/07/2016       No Known Allergies      Current Outpatient Medications:      acetaminophen (TYLENOL) 500 MG tablet, Take 500-1,000 mg by mouth., Disp: , Rfl:      ARIPiprazole (ABILIFY) 30 MG tablet, Take 30 mg by mouth daily., Disp: , Rfl:      aspirin 81 MG EC tablet, Take 1 tablet (81 mg total) by mouth daily., Disp: 120 tablet, Rfl: 3      diaper,brief,adult,disposable Misc, Use 1 each As Directed continuous as needed., Disp: 120 each, Rfl: 5     disposable gloves Misc, Use as needed., Disp: 100 each, Rfl: 3     FLUoxetine (PROZAC) 40 MG capsule, Take 40 mg by mouth daily., Disp: , Rfl:      gabapentin (NEURONTIN) 100 MG capsule, , Disp: , Rfl:      ketotifen (ZADITOR/ZYRTEC ITCHY EYES) 0.025 % (0.035 %) ophthalmic solution, INSTILL ONE DROP IN BOTH EYES UP TO TWICE DAILY AS NEEDED **12 TOTAL FILLS*, Disp: 5 mL, Rfl: 10     loratadine (CLARITIN) 10 mg tablet, Take 1 tablet (10 mg total) by mouth daily., Disp: 100 tablet, Rfl: 3     LORazepam (ATIVAN) 0.5 MG tablet, TAKE 2 TABLETS (1MG) BY MOUTH EVERY 8 HOURS AS NEEDED FOR ANXIETY OR SLEEP **1 TOTAL FILL*, Disp: 60 tablet, Rfl: 1     mirabegron (MYRBETRIQ) 50 mg Tb24 ER tablet, Take 50 mg by mouth daily., Disp: , Rfl:      miscellaneous medical supply Misc, at bedtime, Disp: , Rfl:      multivitamin with folic acid (ONE DAILY MULTIVITAMIN) 400 mcg Tab, Take 1 tablet by mouth daily., Disp: 90 tablet, Rfl: 3     polyethylene glycol (MIRALAX) 17 gram packet, DISSOLVE 1 PACKET INTO LIQUID AND DRINK BY MOUTH EVERY OTHER DAY;INCREASE TO ONCE DAILY DOSE IF CONSTIPATED., Disp: 14 each, Rfl: 11     TOVIAZ 4 mg Tb24 ER tablet, , Disp: , Rfl:      trolamine salicylate-aloe vera (ASPERCREME WITH ALOE) 10 % Crea, Apply to left shoulder area 3x daily as needed., Disp: 1 Tube, Rfl: 2    Review of Systems - 10 point Review of Systems is negative except for ingrown nails which is noted in HPI.      OBJECTIVE:  There were no vitals filed for this visit.  General appearance: Patient is alert and fully cooperative with history & exam.  No sign of distress is noted during the visit.

## 2021-06-13 NOTE — PROGRESS NOTES
Assessment/Plan:         1. Dysuria  Urinalysis-UC if Indicated     No acute UTIs presents today with urinalysis.  If symptoms worsen he should return to the clinic to have this reevaluated.  I did recommend that he work on practicing his exercises on a more frequent basis to help decrease the episodes of incontinence as well as improve his urgency and frequency.  I encouraged him to try to do these 3 times a day and suggested breakfast lunch and dinner as good timing on when to complete these exercises.  He reports that he will work on improving his compliance with doing his exercises.  In addition I recommended that he sits to urinate as well as decrease his caffeine intake to help decrease the incidence of the symptoms as well.  Recommend following up as directed by urology as well as his primary care otherwise as needed.    Subjective:      Dayday Silva is a 52 y.o. male who presents for concerns of burning with urination. He has had years of urgency and frequency. He started having pain with urination last evening. He will also have leaking. He is not sexually active. He admits that he does not drink enough water. He drinks decaf coffee, orange juice, grape juice, and caffiene soda.   He will have incontinence episodes on a semi-regular basis at least weekly to a couple times weekly.  He wears depends however at times refuses to wear these.  He has been advised to do [place] and abdominal exercises to help with the incontinence.  He does have a significant history of prostate cancer and he is status post prostatectomy in December 2016.  He follows with urology as recommended.  He also has a significant past medical history for schizoaffective disorder as well as major depression.  In clinic with him today is 1 of his care workers.    The following portions of the patient's history were reviewed and updated as appropriate: allergies, current medications and problem list.    Review of Systems   Pertinent  items are noted in HPI.      Objective:      /80 (Patient Site: Right Arm, Patient Position: Sitting, Cuff Size: Adult Regular)  Pulse 80  Wt 211 lb 8 oz (95.9 kg)  BMI 35.2 kg/m2    General appearance: alert, appears stated age and cooperative  Head: Normocephalic, without obvious abnormality, atraumatic  Back: symmetric, no curvature. ROM normal. No CVA tenderness.  Lungs: clear to auscultation bilaterally  Heart: regular rate and rhythm, S1, S2 normal, no murmur, click, rub or gallop  Neurologic: Grossly normal      Results for orders placed or performed in visit on 09/21/17   Urinalysis-UC if Indicated   Result Value Ref Range    Color, UA Yellow Colorless, Yellow, Straw, Light Yellow    Clarity, UA Clear Clear    Glucose, UA Negative Negative    Bilirubin, UA Negative Negative    Ketones, UA Negative Negative    Specific Gravity, UA 1.020 1.005 - 1.030    Blood, UA Negative Negative    pH, UA 6.5 5.0 - 8.0    Protein, UA Negative Negative mg/dL    Urobilinogen, UA 0.2 E.U./dL 0.2 E.U./dL, 1.0 E.U./dL    Nitrite, UA Negative Negative    Leukocytes, UA Negative Negative

## 2021-06-14 NOTE — PROGRESS NOTES
Assessment and Plan:     1. Encounter for general adult medical examination with abnormal findings  Routine history and physical, updated in EMR.  Fasting labs are updated as below.  Immunizations are up-to-date.  Colonoscopy will be due next year.  Plan repeat physical in 1 year.    2. Schizoaffective disorder, unspecified type/Moderate episode of recurrent major depressive disorder/Mild cognitive disorder  Patient resides in a group home setting.  He has help with activities of daily living including finances, shopping, transportation, and medication administration.  Both of his parents are still living and his parents are in charge of his finances it sounds like.  Group home staff prepare meals.  - Basic Metabolic Panel    3. Incontinence  Patient continues to leak urine.  He does follow closely with urology.  This is a chronic issue for him.  - disposable gloves Misc; Use as needed.  Dispense: 100 each; Refill: 3    4. Class 2 obesity due to excess calories without serious comorbidity with body mass index (BMI) of 36.0 to 36.9 in adult  Discussed trying to get some regular cardiovascular exercise, walking at least 3 days per week.  Also discussed healthy diet and portion control.  The following high BMI interventions were performed this visit: encouragement to exercise, weight monitoring, special diet education and lifestyle education regarding diet  - Lipid Branch FASTING    5. Encounter for screening for lipoid disorders  - Lipid Cascade FASTING    6. Bilateral impacted cerumen  This was unable to be lavaged by support staff today.  Referral was placed to ENT for cerumen removal.  - Ambulatory referral to ENT    7. Need for immunization against influenza  - Influenza, Seasonal,Quad Inj, 36+ MOS      The patient's current medical problems were reviewed.    I have had an Advance Directives discussion with the patient.  The following are part of a depression follow up plan for the patient:  mental health care  management  The following health maintenance schedule was reviewed with the patient and provided in printed form in the after visit summary:   Health Maintenance   Topic Date Due     DEPRESSION FOLLOW UP  1965     INFLUENZA VACCINE RULE BASED (1) 08/01/2017     COLONOSCOPY  09/09/2018     ADVANCE DIRECTIVES DISCUSSED WITH PATIENT  07/29/2020     TD 18+ HE  07/29/2025     TDAP ADULT ONE TIME DOSE  Completed        Subjective:   Chief Complaint: Dayday Silva is an 52 y.o. male here for an Annual Wellness visit.     HPI: Patient states he has been feeling well and has no specific questions or concerns today.  He reports his mood has been good.    Review of Systems:  Please see above.  The rest of the complete review of systems are negative for all systems.    Patient Care Team:  Cheryl Parikh MD as PCP - General (Family Medicine)     Patient Active Problem List   Diagnosis     Class 2 obesity due to excess calories without serious comorbidity with body mass index (BMI) of 36.0 to 36.9 in adult     Chronic Major Depression     Lower Back Pain Chronic     Allergic Rhinitis     Varicose Veins     Schizoaffective disorder     Myopia, unspecified laterality     Left anterior shoulder pain     Enlarged prostate with lower urinary tract symptoms     Hypophosphatemia     Trouble in sleeping     Urinary incontinence     Prostate cancer     Past Medical History:   Diagnosis Date     Allergic Rhinitis      Cancer     Prostate     Chronic Major Depression      Lower Back Pain Chronic      Obesity      Schizoaffective disorder 6/24/2015      Past Surgical History:   Procedure Laterality Date     HEMORRHOID SURGERY       OR LAP,PROSTATECTOMY,RADICAL,W/NERVE SPARE,INCL ROBOTIC Bilateral 12/7/2016    Procedure: DA ETHAN RADICAL RETROPUBIC PROSTATECTOMY, BILATERAL PELVIC LYMPH NODE DISSECTION ;  Surgeon: Ulisses French MD;  Location: [facility];  Service: Urology     PROSTATE BIOPSY       PROSTATECTOMY   12/07/2016      Family History   Problem Relation Age of Onset     Prostate cancer Paternal Grandfather      No Medical Problems Mother      No Medical Problems Father      Colon cancer Paternal Aunt      Diabetes Maternal Grandfather      Breast cancer Neg Hx      Heart disease Neg Hx       Social History     Social History     Marital status: Single     Spouse name: N/A     Number of children: N/A     Years of education: N/A     Occupational History     Not on file.     Social History Main Topics     Smoking status: Former Smoker     Smokeless tobacco: Former User     Alcohol use No     Drug use: No     Sexual activity: No     Other Topics Concern     Not on file     Social History Narrative      Current Outpatient Prescriptions   Medication Sig Dispense Refill     ARIPiprazole (ABILIFY) 30 MG tablet Take 30 mg by mouth daily.       aspirin 81 MG EC tablet Take 1 tablet (81 mg total) by mouth daily. 120 tablet 3     benztropine (COGENTIN) 0.5 MG tablet Take 1 tablet (0.5 mg total) by mouth daily. 30 tablet 2     diaper,brief,adult,disposable Misc Use 1 each As Directed at bedtime. 120 each 3     disposable gloves Misc Use as needed. 100 each 3     ketotifen (ZADITOR/ZYRTEC ITCHY EYES) 0.025 % (0.035 %) ophthalmic solution Administer 1 drop to both eyes 2 (two) times a day as needed.       loratadine (CLARITIN) 10 mg tablet Take 1 tablet (10 mg total) by mouth daily. 100 tablet 3     LORazepam (ATIVAN) 0.5 MG tablet TAKE 2 TABLETS (1MG) BY MOUTH EVERY 8 HOURS AS NEEDED FOR ANXIETY OR SLEEP **1 TOTAL FILL* 60 tablet 1     multivitamin with folic acid (ONE DAILY MULTIVITAMIN) 400 mcg Tab Take 1 tablet by mouth daily. 90 tablet 3     polyethylene glycol (MIRALAX) 17 gram packet Take 17 g by mouth daily.       venlafaxine (EFFEXOR-XR) 150 MG 24 hr capsule Take 1 capsule (150 mg total) by mouth daily. 30 capsule 5     trolamine salicylate-aloe vera (ASPERCREME WITH ALOE) 10 % Crea Apply to left shoulder area 3x daily as  "needed. 1 Tube 2     No current facility-administered medications for this visit.       Objective:   Vital Signs:   Visit Vitals     /70 (Patient Site: Left Arm, Patient Position: Sitting, Cuff Size: Adult Regular)     Pulse 100     Resp 20     Ht 5' 5.1\" (1.654 m)     Wt 220 lb 1.6 oz (99.8 kg)     BMI 36.51 kg/m2        VisionScreening:  No exam data present     PHYSICAL EXAM  /70 (Patient Site: Left Arm, Patient Position: Sitting, Cuff Size: Adult Regular)  Pulse 100  Resp 20  Ht 5' 5.1\" (1.654 m)  Wt 220 lb 1.6 oz (99.8 kg)  BMI 36.51 kg/m2    General Appearance:    Alert, cooperative, no distress, appears stated age   Head:    Normocephalic, without obvious abnormality, atraumatic   Eyes:    PERRL, conjunctiva/corneas clear, EOM's intact both eyes       Ears:   TMs obscured by moist cerumen bilaterally   Nose:   Nares normal, septum midline, mucosa normal, no drainage    or sinus tenderness   Throat:   Lips, mucosa, and tongue normal; teeth and gums normal   Neck:   Supple, symmetrical, trachea midline, no adenopathy;        thyroid:  No enlargement/tenderness/nodules; no carotid    bruit or JVD   Back:     Symmetric, no curvature, ROM normal, no CVA tenderness   Lungs:     Clear to auscultation bilaterally, respirations unlabored   Chest wall:    No tenderness or deformity   Heart:    Regular rate and rhythm, S1 and S2 normal, no murmur, rub    or gallop   Abdomen:     Obese, soft, non-tender, bowel sounds active all four     quadrants, no masses, no organomegaly   Genitalia:    Normal male without lesion, discharge or tenderness; prostate exam deferred (done recently by Urology)   Rectal:    Not examined   Extremities:   Extremities normal, atraumatic, no cyanosis or edema   Pulses:   2+ and symmetric all extremities   Skin:   Skin color, texture, turgor normal, no rashes or lesions   Lymph nodes:   Cervical, supraclavicular, and axillary nodes normal   Neurologic:   CNII-XII intact. Normal " strength, sensation and reflexes       throughout       Assessment Results 12/19/2017   Activities of Daily Living No help needed   Instrumental Activities of Daily Living 2-4 - Moderate impairment   Mini Cog Total Score 3   Some recent data might be hidden     A Mini-Cog score of 0-2 suggests the possibility of dementia, score of 3-5 suggests no dementia    Identified Health Risks:     He is at risk for lack of exercise and has been provided with information to increase physical activity for the benefit of his well-being.  The patient was counseled and encouraged to consider modifying their diet and eating habits. He was provided with information on recommended healthy diet options.  Information regarding advance directives (living [place]), including where he can download the appropriate form, was provided to the patient via the AVS.

## 2021-06-16 PROBLEM — L60.0 INGROWN NAIL: Status: ACTIVE | Noted: 2020-08-05

## 2021-06-16 PROBLEM — E66.01 MORBID OBESITY (H): Status: ACTIVE | Noted: 2018-10-18

## 2021-06-16 NOTE — TELEPHONE ENCOUNTER
Telephone Encounter by Rhina Wilson LPN at 3/9/2020 10:44 AM     Author: Rhina Wilson LPN Service: -- Author Type: Licensed Nurse    Filed: 3/9/2020 10:46 AM Encounter Date: 3/6/2020 Status: Signed    : Rhina Wilson LPN (Licensed Nurse)       Patient Returning Call  Reason for call:  Isaac Nurse form [facility]   Information relayed to patient:  Cheryl Parikh MD   to Cheryl Parikh (Rvl) Care Team Pool         3/6/20 3:07 PM   Note      I don't understand why quarantine would be needed without a concerning travel history.  Is this for Coronavirus?  If a letter is needed, I think I would need to see the patient to gather more info.  Alternatively, the day program could fax me information about what is needed, but unless someone has traveled internationally, this should not be an issue.        Madie Jo, [facility]           3/6/20 4:10 PM   Note      Day program phone number: [phone],  states she was coughing when they answered her call (Dx bronchitis) and this is in fear of the zuniga virus. Vivi states she has not traveled outside of the us and neither has anyone on staff or residents of the home.      I left a detailed message at the day program for someone to return our call with details of their concern or why Dayday wouldve been quarantined for 14 days due to isolated incident of diarrhea and that Marks provider needs to know the concern she is responding to, what is she clearing him of (illness)      Patient has additional questions:  No  If YES, what are your questions/concerns:  Caller states she took care of every thing . For questions please react out Isaac RAMOS  Okay to leave a detailed message?: No

## 2021-06-17 NOTE — PATIENT INSTRUCTIONS - HE
Patient Instructions by Jose Haywood DO at 7/3/2019 10:00 AM     Author: Jose Haywood DO Service: -- Author Type: Physician    Filed: 7/3/2019 10:21 AM Encounter Date: 7/3/2019 Status: Addendum    : Jose Haywood DO (Physician)    Related Notes: Original Note by Jose Haywood DO (Physician) filed at 7/3/2019 10:19 AM         Patient education: What is a sleep study?     What is a sleep study? -- A sleep study is a test that measures how well you sleep and checks for sleep problems. For some sleep studies, you stay overnight in a sleep lab at a hospital or sleep center.     What happens during a sleep study? -- Before you go to sleep, a technician attaches small, sticky patches called electrodes to your head, chest, and legs. He or she will also place a small tube beneath your nose and might wrap 1 or 2 belts around your chest.   Each of these items has wires that connect to monitors. The monitors record your movement, brain activity, breathing, and other body functions while you sleep.  If you have a history of trouble falling asleep, your doctor might prescribe a medicine to help you fall asleep in the lab. If you have never taken the medicine before, your doctor might ask you take it on a night before your sleep study to see how it affects you.   Why might my doctor order a sleep study? -- Your doctor will order a sleep study if he or she thinks you have sleep apnea or a different condition that makes you:   ?Have sudden jerking leg movements while you sleep, called periodic limb movements.   ?Feel very sleepy during the day and fall asleep all of a sudden, called narcolepsy.   ?Have trouble falling asleep or staying asleep over a long period of time, called chronic insomnia.   ?Do odd things while you sleep, such as walking.  How should I prepare for a sleep study? -- On the day of your sleep study, you should:   ?Avoid alcohol   ?Avoid drinking coffee, tea, sodas, and other drinks that  have caffeine in the afternoon and evening   ?Take all of your regular medicines    The cost of care estimate line is [phone]. They are able to give the patient an estimate of the charges and also an estimate of their insurance coverage/patient responsibility.    Please bring one tab of low dose melatonin 3 mg or less to the night of the study.    If the patient wishes to take the melatonin. It is completely voluntary.    Patient may take own melatonin after arrival to sleep center. Do not drive or operate machinery after intake of melatonin.     Consider holding off on CPAP usage 2 night before the sleep study.

## 2021-06-17 NOTE — PATIENT INSTRUCTIONS - HE
Patient Instructions by Cheryl Parikh MD at 1/15/2019 11:30 AM     Author: Cheryl Parikh MD Service: -- Author Type: Physician    Filed: 1/15/2019 12:15 PM Encounter Date: 1/15/2019 Status: Signed    : Cheryl Parikh MD (Physician)         Patient Education   Instrumental Activities of Daily Living  Your Health Risk Assessment indicates you have difficulties with instrumental activities of daily living which include laundry, housekeeping, banking, shopping, using the telephone, food preparation, transportation, or taking your own medications. Please make a follow up appointment for us to address this issue in more detail.    [employer] has resources available on the following website: [email or web address]     Also, here is a local agency that provides help with meals and other assistance:   [facility] Line: [phone]     Patient Education   Urinary Incontinence (Male)    Urinary incontinence means not being able to control the release of urine from the bladder.  Causes  Common causes of urinary incontinence in men include:    Infection    Certain medicines    Aging    Poor pelvic muscle tone    Bladder spasms    Obesity    Urinary retention  Nervous system diseases, diabetes, sleep apnea, urinary tract infections, prostate surgery, and pelvic trauma can also cause incontinence. Constipation and smoking have also been identified as risk factors.  Symptoms    Urge incontinence (also called overactive bladder) is a sudden urge to urinate even though there may not be much urine in the bladder. The need to urinate often during the night is common. It is due to bladder spasms.    Stress incontinence is involuntary urine leakage that can occur with sneezing, coughing, and other actions that put stress on the bladder.  Treatment  Treatment of urinary incontinence depends on the cause. Infections of the bladder are treated with antibiotics. Urinary retention is  treated with a bladder catheter.  Home care  Follow these guidelines when caring for yourself at home:    Don't consume foods and drinks that may irritate the bladder. These include drinks containing alcohol, caffeinate, or carbonation; chocolate; and acidic fruits and juices.    Limit fluid intake to 6 to 8 cups a day.    Lose weight if you are overweight. This will reduce your symptoms.    If needed, wear absorbent pads to catch urine. Change pads frequently to maintain hygiene and prevent skin and bladder infections.    Bathe daily to maintain good hygiene.    If an antibiotic was prescribed to treat a bladder infection, be sure to take it until finished, even if you are feeling better before then. This is to make sure your infection has cleared.    If a catheter was left in place, it is important to keep bacteria from getting into the collection bag. Don't disconnect the catheter from the collection bag.    Use a leg band to secure the catheter drainage tube, so it does not pull on the catheter. Drain the collection bag when it becomes full using the drain spout at the bottom of the bag. Don't disconnect the bag from the catheter.    Don't pull on or try to remove a catheter. The catheter must be removed by a healthcare provider.  Follow-up care  Follow up with your healthcare provider, or as advised.  When to seek medical advice  Call your healthcare provider right away if any of these occur:    Fever over 100.4 F (38 C), or as directed by your healthcare provider    Bladder pain or fullness    Abdominal swelling, nausea, or vomiting    Back pain    Weakness, dizziness, or fainting    If a catheter was left in place, return if:  ? Catheter falls out  ? Catheter stops draining for 6 hours  Date Last Reviewed: 10/1/2017    [phone] [employer]. [address] [place] [address], [place] [address]. All rights reserved. This information is not intended as a substitute for professional medical care. Always  follow your healthcare professional's instructions.     Patient Education   Understanding Advance Care Planning  Advance care planning is the process of deciding ones own future medical care. It helps ensure that if you cant speak for yourself, your wishes can still be carried out. The plan is a series of legal documents that note a persons wishes. The documents vary by state. Advance care planning may be done when a person has a serious illness that is expected to get worse. It may be done before major surgery. And it can help you and your family be prepared in case of a major illness or injury. Advance care planning helps with making decisions at these times.       A health care proxy is a person who acts as the voice of a patient when the patient cant speak for himself or herself. The name of this role varies by state. It may be called a Durable Medical Power of  or Durable Power of  for Healthcare. It may be called an agent, surrogate, or advocate. Or it may be called a representative or decision maker. It is an official duty that is identified by a legal document. The document also varies by state.    Why Is Advance Care Planning Important?  If a person communicates their healthcare wishes:    They will be given medical care that matches their values and goals.    Their family members will not be forced to make decisions in a crisis with no guidance.  Creating a Plan  Making an advance care plan is often done in 3 steps:    Thinking about ones wishes. To create an advance care plan, you should think about what kind of medical treatment you would want if you lose the ability to communicate. Are there any situations in which you would refuse or stop treatment? Are there therapies you would want or not want? And whom do you want to make decisions for you? There are many places to learn more about how to plan for your care. Ask your doctor or  for resources.    Picking a health care  proxy. This means choosing a trusted person to speak for you only when you cant speak for yourself. When you cannot make medical decisions, your proxy makes sure the instructions in your advance care plan are followed. A proxy does not make decisions based on his or her own opinions. They must put aside those opinions and values if needed, and carry out your wishes.    Filling out the legal documents. There are several kinds of legal documents for advance care planning. Each one tells health care providers your wishes. The documents may vary by state. They must be signed and may need to be witnessed or notarized. You can cancel or change them whenever you wish. Depending on your state, the documents may include a Healthcare Proxy form, Living Will, Durable Medical Power of , Advance Directive, or others.  The Familys Role  The best help a family can give is to support their loved ones wishes. Open and honest communication is vital. Family should express any concerns they have about the patients choices while the patient can still make decisions.    0221-8528 [employer]. [address] [place] [address]. All rights reserved. This information is not intended as a substitute for professional medical care. Always follow your healthcare professional's instructions.         Also, [facility] offers a free, downloadable health care directive that allows you to share your treatment choices and personal preferences if you cannot communicate your wishes. It also allows you to appoint another person (called a health care agent) to make health care decisions if you are unable to do so. You can download an advance directive by going here: [email or web address]     Patient Education   Personalized Prevention Plan  You are due for the preventive services outlined below.  Your care team is available to assist you in scheduling these services.  If you have  already completed any of these items, please share that information with your care team to update in your medical record.  Health Maintenance   Topic Date Due   ? DEPRESSION FOLLOW UP  1965   ? COLONOSCOPY  09/19/2021   ? ADVANCE DIRECTIVES DISCUSSED WITH PATIENT  12/19/2022   ? TD 18+ HE  07/29/2025   ? INFLUENZA VACCINE RULE BASED  Completed   ? TDAP ADULT ONE TIME DOSE  Completed

## 2021-06-17 NOTE — PROGRESS NOTES
"  Assessment/Plan:         1. Frequent urination  Urinalysis-UC if Indicated   2. Increased thirst  Basic Metabolic Panel    Urinalysis-UC if Indicated   3. Obese     4. Frequent stools     5. Incontinence  disposable gloves Misc    diaper,brief,adult,disposable Misc   6. Prostate cancer  PSA, Diagnostic (Prostatic-Specific Antigen)     PSA will be checked today this will be sent to his urologist.  In addition urinalysis was checked and this was clear.  He has had increased thirst causing increased urination I am suspicious that this could be due to his medications causing him to feel \"dry\".  Will evaluate the blood work and see if this continues to be a problem for him.  In addition we will check a blood sugar to assure no possibility of diabetes. In terms of his frequency of stools I did discuss decreasing the MiraLAX dose from daily to every other day to see if this improves the frequency of his stooling without causing constipation.  If constipation occurs would consider going back to daily however at a lower dose.  Renewed a prescription for his incontinence products and printed this out.  Patient to follow-up otherwise as directed with his primary care provider.  Patient and group home staff were in agreement with this plan.    Subjective:      Dayday Silva is a 53 y.o. male who presents with 1 of the staff from his group home for concerns of increased thirst which has been causing increased urination. He follows urology closely for incontinence. He was started on Myrbetriq 25mg along with the Oxybutyin for overactive bladder. He follows with urology yearly however he is due to have a PSA completed today.  The group home staff are concerned about his increased thirst and possible diabetes.  In review of his previous lab work he has had stable blood sugars and has not had any concerns for diabetes.  In review of medications he is on some medications that can increase dry mouth and thirst.  He also mentions " "concerns regarding his bowel function.  He reports that he occasionally has increased urgency and is stooling several times throughout the day.  He denies any diarrhea and that the stool itself is typically formed and soft.  He has been on MiraLAX once daily for the last year following his prostatectomy.  He has not experienced any constipation.  And finally his  mentions that she would like a new prescription for his briefs as well as gloves.  They are changing medical supply company is where they obtain the products from.  He is also utilizing the adult briefs continuously throughout the day with his increased thirst and increased urination.  Urinary incontinence continues to be an ongoing issue for him.  He denies any shortness of breath or difficulty breathing.  He also denies any nausea, vomiting, or diarrhea.    The following portions of the patient's history were reviewed and updated as appropriate: allergies, current medications and problem list.    Review of Systems   Pertinent items are noted in HPI.      Objective:      /70  Pulse 84  Resp 16  Ht 5' 5\" (1.651 m)  Wt 220 lb (99.8 kg)  BMI 36.61 kg/m2    General appearance: alert, appears stated age and cooperative  Head: Normocephalic, without obvious abnormality, atraumatic  Lungs: clear to auscultation bilaterally  Heart: regular rate and rhythm, S1, S2 normal, no murmur, click, rub or gallop  Abdomen: soft, non-tender; bowel sounds normal; no masses,  no organomegaly  Extremities: extremities normal, atraumatic, no cyanosis or edema  Neurologic: Grossly normal     Results for orders placed or performed in visit on 04/12/18   Urinalysis-UC if Indicated   Result Value Ref Range    Color, UA Yellow Colorless, Yellow, Straw, Light Yellow    Clarity, UA Clear Clear    Glucose, UA Negative Negative    Bilirubin, UA Negative Negative    Ketones, UA Negative Negative    Specific Gravity, UA 1.015 1.005 - 1.030    Blood, UA Negative " Negative    pH, UA 7.0 5.0 - 8.0    Protein, UA Negative Negative mg/dL    Urobilinogen, UA 0.2 E.U./dL 0.2 E.U./dL, 1.0 E.U./dL    Nitrite, UA Negative Negative    Leukocytes, UA Negative Negative

## 2021-06-17 NOTE — PROGRESS NOTES
HPI: This patient presents to clinic today for cerumen removal. Has noticed some fullness of the ears and muffled hearing, but denies otalgia, otorrhea, vertigo, change in true hearing or tinnitus. Denies other symptoms.    Past medical history, surgical history, family history, social history, medications, and allergies have been reviewed and are documented above.     Review of Systems: a 10-system review was performed. Symptoms are noted in the HPI and on a scanned document in the computer chart.    Physical Examination:   GEN: no acute distress, alert and oriented  EYES: extraocular movements intact, pupils equal and round. Sclera clear.   EARS: bilateral cerumen impactions cleared under binocular microscopy using suction and microdissection.  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 The tympanic membranes are noted to be intact and clear with no signs of infections, effusions, perforations, or retractions.  PULM: breathing comfortably on room air with no stertor or stridor. Chest expansion symmetric.  HEART: regular rate and rhythm, no peripheral edema    MEDICAL DECISION-MAKING: cerumen impactions cleared under binocular microscopy without difficulty. Hearing restored to baseline. Follow-up one year to assess change to the osteoma.

## 2021-06-17 NOTE — PATIENT INSTRUCTIONS - HE
Patient Instructions by Gina Coronel CNP at 1/9/2020  1:40 PM     Author: Gina Coronel CNP Service: -- Author Type: Nurse Practitioner    Filed: 1/9/2020  2:05 PM Encounter Date: 1/9/2020 Status: Signed    : Gina Coronel CNP (Nurse Practitioner)         Patient Education   Instrumental Activities of Daily Living  Your Health Risk Assessment indicates you have difficulties with instrumental activities of daily living which include laundry, housekeeping, banking, shopping, using the telephone, food preparation, transportation, or taking your own medications. Please make a follow up appointment for us to address this issue in more detail.    [employer] has resources available on the following website: [email or web address]     Also, here is a local agency that provides help with meals and other assistance:   [facility] Line: [phone]     Patient Education   Urinary Incontinence (Male)    Urinary incontinence means not being able to control the release of urine from the bladder.  Causes  Common causes of urinary incontinence in men include:    Infection    Certain medicines    Aging    Poor pelvic muscle tone    Bladder spasms    Obesity    Urinary retention  Nervous system diseases, diabetes, sleep apnea, urinary tract infections, prostate surgery, and pelvic trauma can also cause incontinence. Constipation and smoking have also been identified as risk factors.  Symptoms    Urge incontinence (also called overactive bladder) is a sudden urge to urinate even though there may not be much urine in the bladder. The need to urinate often during the night is common. It is due to bladder spasms.    Stress incontinence is involuntary urine leakage that can occur with sneezing, coughing, and other actions that put stress on the bladder.  Treatment  Treatment of urinary incontinence depends on the cause. Infections of the bladder are treated with antibiotics. Urinary  retention is treated with a bladder catheter.  Home care  Follow these guidelines when caring for yourself at home:    Don't consume foods and drinks that may irritate the bladder. These include drinks containing alcohol, caffeinate, or carbonation; chocolate; and acidic fruits and juices.    Limit fluid intake to 6 to 8 cups a day.    Lose weight if you are overweight. This will reduce your symptoms.    If needed, wear absorbent pads to catch urine. Change pads frequently to maintain hygiene and prevent skin and bladder infections.    Bathe daily to maintain good hygiene.    If an antibiotic was prescribed to treat a bladder infection, be sure to take it until finished, even if you are feeling better before then. This is to make sure your infection has cleared.    If a catheter was left in place, it is important to keep bacteria from getting into the collection bag. Don't disconnect the catheter from the collection bag.    Use a leg band to secure the catheter drainage tube, so it does not pull on the catheter. Drain the collection bag when it becomes full using the drain spout at the bottom of the bag. Don't disconnect the bag from the catheter.    Don't pull on or try to remove a catheter. The catheter must be removed by a healthcare provider.  Follow-up care  Follow up with your healthcare provider, or as advised.  When to seek medical advice  Call your healthcare provider right away if any of these occur:    Fever over 100.4 F (38 C), or as directed by your healthcare provider    Bladder pain or fullness    Abdominal swelling, nausea, or vomiting    Back pain    Weakness, dizziness, or fainting    If a catheter was left in place, return if:  ? Catheter falls out  ? Catheter stops draining for 6 hours  Date Last Reviewed: 10/1/2017    [id number] [employer]. [address] [place] [address], [place] [address]. All rights reserved. This information is not intended as a substitute for professional medical care.  Always follow your healthcare professional's instructions.     Patient Education   Understanding Advance Care Planning  Advance care planning is the process of deciding ones own future medical care. It helps ensure that if you cant speak for yourself, your wishes can still be carried out. The plan is a series of legal documents that note a persons wishes. The documents vary by state. Advance care planning may be done when a person has a serious illness that is expected to get worse. It may be done before major surgery. And it can help you and your family be prepared in case of a major illness or injury. Advance care planning helps with making decisions at these times.       A health care proxy is a person who acts as the voice of a patient when the patient cant speak for himself or herself. The name of this role varies by state. It may be called a Durable Medical Power of  or Durable Power of  for Healthcare. It may be called an agent, surrogate, or advocate. Or it may be called a representative or decision maker. It is an official duty that is identified by a legal document. The document also varies by state.    Why Is Advance Care Planning Important?  If a person communicates their healthcare wishes:    They will be given medical care that matches their values and goals.    Their family members will not be forced to make decisions in a crisis with no guidance.  Creating a Plan  Making an advance care plan is often done in 3 steps:    Thinking about ones wishes. To create an advance care plan, you should think about what kind of medical treatment you would want if you lose the ability to communicate. Are there any situations in which you would refuse or stop treatment? Are there therapies you would want or not want? And whom do you want to make decisions for you? There are many places to learn more about how to plan for your care. Ask your doctor or  for resources.    Picking a health  care proxy. This means choosing a trusted person to speak for you only when you cant speak for yourself. When you cannot make medical decisions, your proxy makes sure the instructions in your advance care plan are followed. A proxy does not make decisions based on his or her own opinions. They must put aside those opinions and values if needed, and carry out your wishes.    Filling out the legal documents. There are several kinds of legal documents for advance care planning. Each one tells health care providers your wishes. The documents may vary by state. They must be signed and may need to be witnessed or notarized. You can cancel or change them whenever you wish. Depending on your state, the documents may include a Healthcare Proxy form, Living Will, Durable Medical Power of , Advance Directive, or others.  The Familys Role  The best help a family can give is to support their loved ones wishes. Open and honest communication is vital. Family should express any concerns they have about the patients choices while the patient can still make decisions.    [id number] [employer]. [address] [place] [address]. All rights reserved. This information is not intended as a substitute for professional medical care. Always follow your healthcare professional's instructions.         Also, [facility] offers a free, downloadable health care directive that allows you to share your treatment choices and personal preferences if you cannot communicate your wishes. It also allows you to appoint another person (called a health care agent) to make health care decisions if you are unable to do so. You can download an advance directive by going here: [email or web address]     Patient Education   Personalized Prevention Plan  You are due for the preventive services outlined below.  Your care team is available to assist you in scheduling these services.  If you have  already completed any of these items, please share that information with your care team to update in your medical record.  Health Maintenance   Topic Date Due   ? HEPATITIS C SCREENING  1965   ? ZOSTER VACCINES (1 of 2) 03/28/2014   ? MEDICARE ANNUAL WELLNESS VISIT  01/15/2020   ? COLONOSCOPY  09/19/2021   ? LIPID  01/15/2024   ? ADVANCE CARE PLANNING  01/15/2024   ? TD 18+ HE  07/29/2025   ? DEPRESSION ACTION PLAN  Completed   ? HIV SCREENING  Completed   ? INFLUENZA VACCINE RULE BASED  Completed   ? TDAP ADULT ONE TIME DOSE  Completed

## 2021-06-17 NOTE — PROGRESS NOTES
Audiology Report:      History:  Dayday Silva is seen today for comprehensive hearing evaluation. He is accompanied today by a caregiver from his group home. He has a history of schizoaffective disorder and mild cognitive disorder. He denies hearing concerns, however his caregiver reports that he prefers the radio and television turned at a louder volume than others prefer. He reports otalgia, otorrhea, and aural fullness in the right ear. He states his mother has hearing loss. 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 A note from his primary care doctor on 12/19/17 reports bilateral wax impactions with recommendation for removal with ENT. This was not done prior to today's appointment according to the best knowledge of the caregiver.      Results:     Left Ear Right Ear   Otoscopy occluding cerumen occluding cerumen   Tympanometry flat (Type B)  with small ear canal volume indicating possible occlusion  normal (Type A)       Plan: Testing ended due to wax impactions. Explained to patient and caregiver that wax needs to be removed first in order to obtain an accurate hearing evaluation. Assisted patient in scheduling wax removal with Dr. Valdez along with a hearing evaluation at a later date with myself. Completed paperwork brought in from group [place].    Please see audiogram under  media  and  audiogram  in the patient s chart.

## 2021-06-18 NOTE — PROGRESS NOTES
Audiology Report:    Referring Provider:  Dr. Valdez    History:  Dayday Silva is seen today for comprehensive hearing evaluation. He is accompanied by Vivi, a caregiver from his group home.  A hearing test was attempted on 4/27/2018 but could not be completed due to wax impaction. Please see previous note for patient history.  Cerumen was removed by Dr. Valdez on 5/11/2018.  He denies any changes to his ears or hearing since his last appointment.     Results:     Left Ear Right Ear   Otoscopy clear canals clear canals   Pure Tone Audiometry normal hearing from 250-8000 Hz   Normal hearing from 250-6000 Hz with a mild, high-frequency sensorineural hearing loss at 8000 Hz   Word Recognition excellent excellent   Tympanometry normal (Type A)  normal (Type A)     Transducer: Circumaural headphones    Reliability was good  and there was good  SRT to PTA agreement.       Plan:  Results are discussed in detail.  He should return for retesting 2-3 years, or sooner with concerns.   The patient is counseled on hearing protection.    Please see audiogram under  media  and  audiogram  in the patient s chart.     Edwardo Whitney, CCC-A  [place] Licensed Audiologist #[id number]

## 2021-06-18 NOTE — LETTER
Letter by Cheryl Parikh MD at      Author: Cheryl Parikh MD Service: -- Author Type: --    Filed:  Encounter Date: 1/15/2019 Status: (Other)       Dayday Silva  [address] [place] [id number]             January 15, 2019         Dear Mr. Silva,    Below are the results from your recent visit:    Resulted Orders   Lipid [place] FASTING   Result Value Ref Range    Cholesterol 179 <=199 mg/dL    Triglycerides 184 (H) <=149 mg/dL    HDL Cholesterol 35 (L) >=40 mg/dL    LDL Calculated 107 <=129 mg/dL    Patient Fasting > 8hrs? Yes    Basic Metabolic Panel   Result Value Ref Range    Sodium 138 136 - 145 mmol/L    Potassium 4.3 3.5 - 5.0 mmol/L    Chloride 104 98 - 107 mmol/L    CO2 27 22 - 31 mmol/L    Anion Gap, Calculation 7 5 - 18 mmol/L    Glucose 89 70 - 125 mg/dL    Calcium 9.0 8.5 - 10.5 mg/dL    BUN 12 8 - 22 mg/dL    Creatinine 0.86 0.70 - 1.30 mg/dL    GFR MDRD Af Amer >60 >60 mL/min/1.73m2    GFR MDRD Non Af Amer >60 >60 mL/min/1.73m2    Narrative    Fasting Glucose reference range is 70-99 mg/dL per  American Diabetes Association (ADA) guidelines.       Your labs look good.  Keep up the good work, and plan recheck in 1 year.    Please call with questions or contact us using [employer].    Sincerely,        Electronically signed by Cheryl Parikh MD

## 2021-06-19 NOTE — PROGRESS NOTES
Subjective:      Patient ID: Dayday Silva is a 53 y.o. male.    Chief Complaint:   Chief Complaint   Patient presents with     Fall     fell 3 days ago and still having right shoulder pain         HPI States was getting into a van and fell backwards off two steps.  Landed on right shoulder. C/O pain rt shoulder and rt upper arm.  No LOC, no head injury.  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  Has previously attended physical therapy due to shoulder arthritis.  Lives at group home.  Here with staff member.  Has not had anything for pain today.        Past Medical History:   Diagnosis Date     Allergic Rhinitis      Cancer (H)     Prostate     Chronic Major Depression      Lower Back Pain Chronic      Obesity      Schizoaffective disorder (H) 6/24/2015       Past Surgical History:   Procedure Laterality Date     HEMORRHOID SURGERY       VA LAP,PROSTATECTOMY,RADICAL,W/NERVE SPARE,INCL ROBOTIC Bilateral 12/7/2016    Procedure: DA ETHAN RADICAL RETROPUBIC PROSTATECTOMY, BILATERAL PELVIC LYMPH NODE DISSECTION ;  Surgeon: Ulisses French MD;  Location: [facility];  Service: Urology     PROSTATE BIOPSY       PROSTATECTOMY  12/07/2016       Family History   Problem Relation Age of Onset     Prostate cancer Paternal Grandfather      No Medical Problems Mother      No Medical Problems Father      Colon cancer Paternal Aunt      Diabetes Maternal Grandfather      Breast cancer Neg Hx      Heart disease Neg Hx        Social History   Substance Use Topics     Smoking status: Former Smoker     Smokeless tobacco: Former User     Alcohol use No       Review of Systems    Denies numbness tingling weakness.    No confusion or head trauma.    Objective:     /68 (Patient Site: Left Arm, Patient Position: Sitting, Cuff Size: Adult Regular)  Pulse 83  Temp 98.2  F (36.8  C) (Oral)   Resp 18  Wt 218 lb 4 oz (99 kg)  SpO2 95%  BMI 36.32 kg/m2    Physical Exam   Constitutional: He appears well-developed.   Neck: Normal  "range of motion.   Cardiovascular: Intact distal pulses.    Musculoskeletal: He exhibits tenderness. He exhibits no deformity.        Right shoulder: He exhibits decreased range of motion, tenderness and pain. He exhibits no swelling, no effusion, no crepitus and no deformity.        Left shoulder: He exhibits normal range of motion and normal strength.   RUE shoulder ROM limited to 90 degrees when arm held in front and to side.  Neg drop arm test.      L shoulder mild limitation of ROM at extremes               No results found for this or any previous visit (from the past 24 hour(s)).  Xr Humerus Right 2 Vws    Result Date: 7/14/2018  EXAM DATE:         07/14/2018 [facility] X-RAY HUMERUS RIGHT, MINIMUM 2 VIEWS 7/14/2018 9:45 AM INDICATION: Fall from two stairs, pain COMPARISON: None. FINDINGS: Negative humerus. No fracture.     Xr Shoulder Right 2 Or More Vws    Result Date: 7/14/2018  EXAM DATE:         07/14/2018 [facility] X-RAY SHOULDER RIGHT, MINIMUM 2 VIEWS 7/14/2018 10:00 AM INDICATION: Fall from two stairs, pain COMPARISON: None. FINDINGS: Negative shoulder. No fracture or dislocation.           Assessment:     Procedures    The encounter diagnosis was Pain in joint, upper arm, right.    Plan:     Diagnoses and all orders for this visit:    Pain in joint, upper arm, right  -     XR Humerus Right 2 VWS; Future; Expected date: 7/14/18  -     XR Shoulder Right 2 or More VWS; Future; Expected date: 7/14/18  -     XR Humerus Right 2 VWS  -     XR Shoulder Right 2 or More VWS  -     ketorolac injection 30 mg (TORADOL); Inject 1 mL (30 mg total) into the shoulder, thigh, or buttocks once.      Patient states pain is \"mild\" following Toradol injection.  Range of motion has not improved.  Still unable to raise arm above 90  on right side.      Encourage scheduled ibuprofen starting at dinnertime while awake.  Discussed with group home staff member present.  Return to clinic in " about 6 days if he still having problems.  May need physical therapy.    Patient has range of motion exercise info at home that he can do from previous physical therapy for shoulder arthritis.  Recommended trying these if he is feeling better, but not to induce pain. Was doing these last night per  staff member.    At the end of the encounter, I discussed results, diagnosis, medications. Discussed red flags for immediate return to clinic/ER, as well as indications for follow up if no improvement. Patient and/or caregiver  understood and agreed to plan. Patient was stable for discharge.

## 2021-06-20 NOTE — PROGRESS NOTES
Assessment/Plan:       1. Trouble in sleeping  Patient is having medication adjustments in this regard through his Psychiatrist, however his psychiatrist recommended an updated sleep study prior to further changes.  He was recently prescribed hydroxyzine for sleep, but says it is not helpful.  Plan further follow-up with Psychiatry for medication adjustments in this regard.  - Ambulatory referral to Sleep Medicine    2. Obstructive sleep apnea syndrome  This is a historical diagnosis found in the medical record, not admitted by the patient.  He does not mention at any time during the visit that he has sleep apnea, or that he used CPAP.  This was found on chart review after the visit.  Will still go through with updated sleep study as I do not see one on file as far back as 2012, nor do I find this in Care Everywhere records.      3. Urinary incontinence, unspecified type  Patient is working with Urology about this.  He is due for a follow-up visit and his caregiver will assist him in making this appointment.    4. Dry mouth  Discussed options with the patient and he wishes to stop Myrbetriq for now.  He will continue with oxybutynin, continue to drink plenty of fluids.  We also discussed possibly trying Biotene mouth rinse.    5. Class 2 obesity due to excess calories without serious comorbidity with body mass index (BMI) of 35.0 to 35.9 in adult  Again emphasized a healthy, low-carb, low sugar diet and regular exercise.  Patient is contemplative about lifestyle changes.  The following high BMI interventions were performed this visit: encouragement to exercise, weight monitoring, special diet education and lifestyle education regarding diet  - Ambulatory referral to Sleep Medicine    6. Need for vaccination  - Influenza, Seasonal,Quad Inj, 36+ MOS        Subjective:       Dayday Silva is a 53 y.o. male who presents for primarily a referral for a sleep study.  Patient offers little history, but his caregiver who  accompanies him today (no relation, group home staff), reports that his Psychiatric provider is attempting to treat trouble sleeping, but before they make further adjustments, wanted to rule out sleep apnea.  Patient says that he sleeps 4-4.5 hours initially, then wakes up and has a hard time falling back to sleep.  His caregiver says that last night he was cleaning and rearranging his bedroom in the middle of the night.  Patient and group home staff are unaware if patient snores.  He says he never wakes feeling rested, and could always nap during the day.  During the interview, patient complains of his mouth being uncomfortable.  He is found to have very dry oral mucosa.    The following portions of the patient's history were reviewed and updated as appropriate: allergies, current medications, past medical history, past social history and problem list.      Current Outpatient Prescriptions:      ARIPiprazole (ABILIFY) 30 MG tablet, Take 30 mg by mouth daily., Disp: , Rfl:      aspirin 81 MG EC tablet, Take 1 tablet (81 mg total) by mouth daily., Disp: 120 tablet, Rfl: 3     benztropine (COGENTIN) 0.5 MG tablet, Take 1 tablet (0.5 mg total) by mouth daily., Disp: 30 tablet, Rfl: 2     diaper,brief,adult,disposable Misc, Use 1 each As Directed continuous as needed., Disp: 120 each, Rfl: 5     disposable gloves Misc, Use as needed., Disp: 100 each, Rfl: 3     FLUoxetine (PROZAC) 40 MG capsule, Take 40 mg by mouth daily., Disp: , Rfl:      ketotifen (ZADITOR/ZYRTEC ITCHY EYES) 0.025 % (0.035 %) ophthalmic solution, Administer 1 drop to both eyes 2 (two) times a day as needed., Disp: , Rfl:      loratadine (CLARITIN) 10 mg tablet, Take 1 tablet (10 mg total) by mouth daily., Disp: 100 tablet, Rfl: 3     mirabegron (MYRBETRIQ) 25 mg Tb24 ER tablet, Take 25 mg by mouth daily., Disp: , Rfl:      multivitamin with folic acid (ONE DAILY MULTIVITAMIN) 400 mcg Tab, Take 1 tablet by mouth daily., Disp: 90 tablet, Rfl: 3      "oxybutynin (DITROPAN XL) 10 MG ER tablet, Take 10 mg by mouth daily., Disp: , Rfl:      polyethylene glycol (MIRALAX) 17 gram packet, DISSOLVE 1 PACKET INTO LIQUID AND DRINK BY MOUTH EVERY OTHER DAY;INCREASE TO ONCE DAILY DOSE IF CONSTIPATED., Disp: 14 each, Rfl: 11     trolamine salicylate-aloe vera (ASPERCREME WITH ALOE) 10 % Crea, Apply to left shoulder area 3x daily as needed., Disp: 1 Tube, Rfl: 2     venlafaxine (EFFEXOR-XR) 150 MG 24 hr capsule, Take 1 capsule (150 mg total) by mouth daily., Disp: 30 capsule, Rfl: 5     venlafaxine (EFFEXOR-XR) 75 MG 24 hr capsule, Take 75 mg by mouth daily., Disp: , Rfl:      LORazepam (ATIVAN) 0.5 MG tablet, TAKE 2 TABLETS (1MG) BY MOUTH EVERY 8 HOURS AS NEEDED FOR ANXIETY OR SLEEP **1 TOTAL FILL*, Disp: 60 tablet, Rfl: 1    Review of Systems   A 12 point comprehensive review of systems was negative except as noted.      Objective:      /68 (Patient Site: Right Arm, Patient Position: Sitting, Cuff Size: Adult Large)  Pulse 84  Resp 20  Ht 5' 5\" (1.651 m)  Wt 211 lb 9.6 oz (96 kg)  BMI 35.21 kg/m2    General appearance: alert, appears stated age and cooperative  Head: Normocephalic, without obvious abnormality, atraumatic  Eyes: conjunctivae clear, sclerae anicteric  Throat: oral mucosa and tongue dry and tacky, 2+ tonsils without exudate or erythema  Neck: no adenopathy, supple, symmetrical, trachea midline and thyroid not enlarged, symmetric, no tenderness/mass/nodules  Lungs: clear to auscultation bilaterally  Heart: regular rate and rhythm, S1, S2 normal, no murmur, click, rub or gallop  Abdomen: soft, non-tender; bowel sounds normal; no masses,  no organomegaly  Neurologic: Alert and oriented X 3, normal strength and tone. Normal symmetric reflexes. Normal coordination and gait  Psychiatric: patient has a restricted affect, his speech is fluent and thought process is linear          "

## 2021-06-20 NOTE — LETTER
Letter by Gina Coronel CNP at      Author: Gina Coronel CNP Service: -- Author Type: --    Filed:  Encounter Date: 1/9/2020 Status: Signed         Dayday Silva  [address] [place] [address]             January 9, 2020         Dear Mr. Silva,    Below are the results from your recent visit:    Resulted Orders   Basic Metabolic Panel   Result Value Ref Range    Sodium 140 136 - 145 mmol/L    Potassium 4.2 3.5 - 5.0 mmol/L    Chloride 108 (H) 98 - 107 mmol/L    CO2 24 22 - 31 mmol/L    Anion Gap, Calculation 8 5 - 18 mmol/L    Glucose 104 70 - 125 mg/dL    Calcium 9.3 8.5 - 10.5 mg/dL    BUN 10 8 - 22 mg/dL    Creatinine 0.91 0.70 - 1.30 mg/dL    GFR MDRD Af Amer >60 >60 mL/min/1.73m2    GFR MDRD Non Af Amer >60 >60 mL/min/1.73m2    Narrative    Fasting Glucose reference range is 70-99 mg/dL per  American Diabetes Association (ADA) guidelines.   Lipid Cascade RANDOM   Result Value Ref Range    Cholesterol 196 <=199 mg/dL    Triglycerides 241 (H) <=149 mg/dL    HDL Cholesterol 35 (L) >=40 mg/dL    LDL Calculated 113 <=129 mg/dL    Patient Fasting > 8hrs? Yes        Labs look good. I suspect the elevation of his triglycerides are related to recent weight gain as well as not fasting. Plan follow up yearly or sooner as needed.     Please call with questions or contact us using [employer].    Sincerely,        Electronically signed by Gina Coronel CNP

## 2021-06-20 NOTE — LETTER
Letter by Neptali Hunt DO at      Author: Neptali Hunt DO Service: -- Author Type: --    Filed:  Encounter Date: 3/9/2020 Status: (Other)         March 9, 2020     Patient: Dayday Silva   YOB: 1965   Date of Visit: 3/9/2020       To Whom it May Concern:    Dayday Silva was seen in my clinic on 3/9/2020.    If you have any questions or concerns, please don't hesitate to call.    Sincerely,         Electronically signed by Neptali Limon DO

## 2021-06-20 NOTE — LETTER
Letter by Neptali Hunt DO at      Author: Neptali Hunt DO Service: -- Author Type: --    Filed:  Encounter Date: 3/9/2020 Status: (Other)         March 9, 2020     Patient: Dayday Silva   YOB: 1965   Date of Visit: 3/9/2020       To Whom It May Concern:    Dayday Sivla is medically cleared to return to the day program at the [facility].    If you have any questions or concerns, please don't hesitate to call.    Sincerely,        Electronically signed by Neptali Limon DO

## 2021-06-21 NOTE — PROGRESS NOTES
Dear Dr. Cheryl Parikh MD  [address]  [place] [address],    Thank you for the opportunity to participate in the care of Dayday Silva.     He is a 53 y.o.  male patient who comes to the sleep medicine clinic for review of his sleep study. The study was completed on 11/12/18 which showed the the patient had moderate obstructive sleep apnea with an apnea hypopnea index of 18.8 events per hour with lowest O2 sat of 83%.  The patient was also found to have periodic limb movement of sleep.    Current Outpatient Medications   Medication Sig Dispense Refill     ARIPiprazole (ABILIFY) 30 MG tablet Take 30 mg by mouth daily.       aspirin 81 MG EC tablet Take 1 tablet (81 mg total) by mouth daily. 120 tablet 3     benztropine (COGENTIN) 0.5 MG tablet Take 1 tablet (0.5 mg total) by mouth daily. 30 tablet 2     diaper,brief,adult,disposable Misc Use 1 each As Directed continuous as needed. 120 each 5     disposable gloves Misc Use as needed. 100 each 3     FLUoxetine (PROZAC) 40 MG capsule Take 40 mg by mouth daily.       ketotifen (ZADITOR/ZYRTEC ITCHY EYES) 0.025 % (0.035 %) ophthalmic solution Administer 1 drop to both eyes 2 (two) times a day as needed.       loratadine (CLARITIN) 10 mg tablet Take 1 tablet (10 mg total) by mouth daily. 100 tablet 3     LORazepam (ATIVAN) 0.5 MG tablet TAKE 2 TABLETS (1MG) BY MOUTH EVERY 8 HOURS AS NEEDED FOR ANXIETY OR SLEEP **1 TOTAL FILL* 60 tablet 1     multivitamin with folic acid (ONE DAILY MULTIVITAMIN) 400 mcg Tab Take 1 tablet by mouth daily. 90 tablet 3     oxybutynin (DITROPAN XL) 10 MG ER tablet Take 10 mg by mouth daily.       polyethylene glycol (MIRALAX) 17 gram packet DISSOLVE 1 PACKET INTO LIQUID AND DRINK BY MOUTH EVERY OTHER DAY;INCREASE TO ONCE DAILY DOSE IF CONSTIPATED. 14 each 11     trolamine salicylate-aloe vera (ASPERCREME WITH ALOE) 10 % Crea Apply to left shoulder area 3x daily as needed. 1 Tube 2     venlafaxine (EFFEXOR-XR) 150 MG 24  hr capsule Take 1 capsule (150 mg total) by mouth daily. 30 capsule 5     No current facility-administered medications for this visit.        No Known Allergies    Physical Exam:  /82   Pulse 85   Wt 210 lb (95.3 kg)   SpO2 94%   BMI 34.95 kg/m    BMI:Body mass index is 34.95 kg/m .   GEN: NAD, obese  Psych: normal mood, normal affect     Labs/Studies:  - We reviewed the results of the overnight PSG as described on the HPI.     Lab Results   Component Value Date    WBC 7.1 12/07/2016    HGB 12.9 (L) 12/08/2016    HCT 43.1 12/07/2016    MCV 88 12/07/2016     12/07/2016         Chemistry        Component Value Date/Time     04/12/2018 1113    K 4.8 04/12/2018 1113     04/12/2018 1113    CO2 27 04/12/2018 1113    BUN 14 04/12/2018 1113    CREATININE 0.83 04/12/2018 1113    GLU 94 05/24/2018 0908        Component Value Date/Time    CALCIUM 9.4 04/12/2018 1113    ALKPHOS 72 09/11/2016 1633    AST 23 09/11/2016 1633    ALT 29 09/11/2016 1633    BILITOT 1.2 (H) 09/11/2016 1633            No results found for: FERRITIN        Assessment and Plan:  In summary Dayday Silva is a 53 y.o. year old male here for review of his sleep study.  1. Obstructive Sleep Apnea  We had an extensive conversation to review the results of his sleep study and to  him on the importance of treating sleep apnea. We discussed treatment options including oral appliance versus CPAP. Patient decided to proceed with CPAP. He will start using the device as soon as he receives it with the intention to use if for the entire night. We discussed some tips to increase PAP tolerance as well as the normal curve of adaptation. CPAP is going to provide improved respiratory function during the night but it can cause some sleep disruption that tends to improve with continuous usage. He should return to the clinic in 8 weeks to review compliance and efficacy monitoring.  Since the patient does not have any preference with  regards to durable medical equipment company, the patient would like to use [employer] as a durable medical company.  2.  Periodic limb movement of sleep  Most likely will resolve after after some pressure therapy.  3.  Hypersomnia     Patient verbalized understanding of these issues, agrees with the plan and all questions were answered today. Patient was given an opportuntity to voice any other symptoms or concerns not listed above. Patient did not have any other symptoms or concerns.        Jose Haywood DO  Board Certified in Internal Medicine and Sleep Medicine  [facility].    We spent a total of 10 minutes of face-to-face encounter and more than 50% of the encounter was used for counseling or coordination of care.    (Note created with Dragon voice recognition and unintended spelling errors and word substitutions may occur)

## 2021-06-21 NOTE — PROGRESS NOTES
Dear Dr. Cheryl Parikh Md  [address]  [address]  [place] [address]    Thank you for the opportunity to participate in the care of Mr. Dayday Silva.    He is a 53 y.o. male who comes to the clinic with a chief complaint of excessive daytime sleepiness that is been going on for more than 4 years.  While the patient denies any episodes of witnessed apnea he states that he was diagnosed with obstructive sleep apnea in the past and was started on CPAP.  His CPAP machine was lost due to transition from different living cysts situations.  His caretaker has observed him having loud snoring during sleep.  They also complained that he wakes up at least 3-4 times a night sometimes with difficulty reinitiating sleep.  As a result the patient will feel tired upon awakening.  The patient's review of systems otherwise unremarkable.     Ideal Sleep-Wake Cycle(devoid of societal pressure):    Patient would try to initiate sleep at around 9 PM with a sleep latency of variable length. The patient would have 2-4 awakening. Final wake up time is around 4-5 AM.    Past Medical History  Past Medical History:   Diagnosis Date     Allergic Rhinitis      Cancer (H)     Prostate     Chronic Major Depression      Lower Back Pain Chronic      Obesity      Schizoaffective disorder (H) 6/24/2015        Past Surgical History  Past Surgical History:   Procedure Laterality Date     HEMORRHOID SURGERY       OK LAP,PROSTATECTOMY,RADICAL,W/NERVE SPARE,INCL ROBOTIC Bilateral 12/7/2016    Procedure: DA ETHAN RADICAL RETROPUBIC PROSTATECTOMY, BILATERAL PELVIC LYMPH NODE DISSECTION ;  Surgeon: Ulisses French MD;  Location: [facility];  Service: Urology     PROSTATE BIOPSY       PROSTATECTOMY  12/07/2016        Meds  Current Outpatient Prescriptions   Medication Sig Dispense Refill     ARIPiprazole (ABILIFY) 30 MG tablet Take 30 mg by mouth daily.       aspirin 81 MG EC tablet Take 1 tablet (81 mg total) by mouth daily. 120  tablet 3     benztropine (COGENTIN) 0.5 MG tablet Take 1 tablet (0.5 mg total) by mouth daily. 30 tablet 2     diaper,brief,adult,disposable Misc Use 1 each As Directed continuous as needed. 120 each 5     disposable gloves Misc Use as needed. 100 each 3     FLUoxetine (PROZAC) 40 MG capsule Take 40 mg by mouth daily.       ketotifen (ZADITOR/ZYRTEC ITCHY EYES) 0.025 % (0.035 %) ophthalmic solution Administer 1 drop to both eyes 2 (two) times a day as needed.       loratadine (CLARITIN) 10 mg tablet Take 1 tablet (10 mg total) by mouth daily. 100 tablet 3     LORazepam (ATIVAN) 0.5 MG tablet TAKE 2 TABLETS (1MG) BY MOUTH EVERY 8 HOURS AS NEEDED FOR ANXIETY OR SLEEP **1 TOTAL FILL* 60 tablet 1     multivitamin with folic acid (ONE DAILY MULTIVITAMIN) 400 mcg Tab Take 1 tablet by mouth daily. 90 tablet 3     oxybutynin (DITROPAN XL) 10 MG ER tablet Take 10 mg by mouth daily.       polyethylene glycol (MIRALAX) 17 gram packet DISSOLVE 1 PACKET INTO LIQUID AND DRINK BY MOUTH EVERY OTHER DAY;INCREASE TO ONCE DAILY DOSE IF CONSTIPATED. 14 each 11     trolamine salicylate-aloe vera (ASPERCREME WITH ALOE) 10 % Crea Apply to left shoulder area 3x daily as needed. 1 Tube 2     venlafaxine (EFFEXOR-XR) 150 MG 24 hr capsule Take 1 capsule (150 mg total) by mouth daily. 30 capsule 5     venlafaxine (EFFEXOR-XR) 75 MG 24 hr capsule Take 75 mg by mouth daily.       No current facility-administered medications for this visit.         Allergies  Review of patient's allergies indicates no known allergies.     Social History  Social History     Social History     Marital status: Single     Spouse name: N/A     Number of children: N/A     Years of education: N/A     Occupational History           [employer]     Social History Main Topics     Smoking status: Former Smoker     Smokeless tobacco: Former User     Alcohol use No     Drug use: No     Sexual activity: No     Other Topics Concern     Not on file     Social History  Narrative        Family History  Family History   Problem Relation Age of Onset     Prostate cancer Paternal Grandfather      No Medical Problems Mother      No Medical Problems Father      Colon cancer Paternal Aunt      Diabetes Maternal Grandfather      Breast cancer Neg Hx      Heart disease Neg Hx         Review of Systems:  Constitutional: Negative except as noted in HPI.   Eyes: Negative except as noted in HPI.   ENT: Negative except as noted in HPI.   Cardiovascular: Negative except as noted in HPI.   Respiratory: Negative except as noted in HPI.   Gastrointestinal: Negative except as noted in HPI.   Genitourinary: Negative except as noted in HPI.   Musculoskeletal: Negative except as noted in HPI.   Integumentary: Negative except as noted in HPI.   Neurological: Negative except as noted in HPI.   Psychiatric: Negative except as noted in HPI.   Endocrine: Negative except as noted in HPI.   Hematologic/Lymphatic: Negative except as noted in HPI.      STOP BANG 10/18/2018   Do you often feel tired, fatigued, or sleepy during daytime? 1   Has anyone observed you stop breathing in your sleep? 0   Do you have or are you being treated for high blood pressure? 0   BMI more than 35 kg/m2 1   Age over 50 years old? 1   Neck circumference greater than 16 inches? 0   Gender male? 1   Epworths Sleepiness Scale 10/18/2018   Sitting and reading 2   Watching TV 3   Sitting, inactive in a public place (e.g. a theatre or a meeting) 2   As a passenger in a car for an hour without a break 3   Lying down to rest in the afternoon when circumstances permit 1   Sitting and talking to someone 0   Sitting quietly after a lunch without alcohol 1   In a car, while stopped for a few minutes in traffic 0   Total score 12   Rooming 10/18/2018   Usual bedtime 9pm   Sleep Latency varies   Awakenings 2-4+ times   Wake Up Time 4-5am   Weekends same   Energy Drinks no   Coffee 2-4 cups qd   Cola 2-4 cans qd   Difficulty falling asleep No  "  Difficulty staying asleep Yes   Excessive daytime tiredness Yes   Excessive daytime sleepiness No   Dozing off while driving No   Shift Worker No   Sleep Walking? No   Sleep Talking? Yes   Kicking or punching? No   Restless legs symptoms No       Physical Exam:  /72  Pulse 82  Ht 5' 5\" (1.651 m)  Wt 211 lb (95.7 kg)  SpO2 94%  BMI 35.11 kg/m2  BMI:Body mass index is 35.11 kg/(m^2).   GEN: NAD, obese  Head: Normocephalic.  EYES: PERRLA, EOMI  ENT: Oropharynx is clear, mallampatti class 3+ airway. Uvula is intact  Nasal mucosa is moist without erythema  Neck : Thyroid is within normal limits. Neck size 15.75in  CV: Regular rate and rhythm, S1 & S2 positive.  LUNGS: Bilateral breathsounds heard.   ABDOMEN: Positive bowel sounds in all quadrants, soft, no rebound or guarding  MUSCULOSKELETAL: Bilateral trace leg swelling  SKIN: warm, dry, no rashes  Neurological: Alert, oriented to time, place, and person.  Psych: normal mood, normal affect     Labs/Studies:     Lab Results   Component Value Date    WBC 7.1 12/07/2016    HGB 12.9 (L) 12/08/2016    HCT 43.1 12/07/2016    MCV 88 12/07/2016     12/07/2016         Chemistry        Component Value Date/Time     04/12/2018 1113    K 4.8 04/12/2018 1113     04/12/2018 1113    CO2 27 04/12/2018 1113    BUN 14 04/12/2018 1113    CREATININE 0.83 04/12/2018 1113    GLU 94 05/24/2018 0908        Component Value Date/Time    CALCIUM 9.4 04/12/2018 1113    ALKPHOS 72 09/11/2016 1633    AST 23 09/11/2016 1633    ALT 29 09/11/2016 1633    BILITOT 1.2 (H) 09/11/2016 1633            No results found for: FERRITIN  Lab Results   Component Value Date    TSH 0.41 02/27/2013         Assessment and Plan:  In summary Dayday Silva is a 53 y.o. year old male here for sleep disturbance.  1.  Hypersomnia   Mr. Dayday Silva has high risk for obstructive sleep apnea based on the history of hypersomnia, snoring and a crowded airway. I educated the patient on the " underlying pathophysiology of obstructive sleep apnea. We reviewed the risks associated with sleep apnea, including increased cardiovascular risk and overall death. We talked about treatments briefly. I recommend getting an split-night nocturnal polysomnography. The patient should return to the clinic to discuss results and treatment option in a patient-centered approach.  2.  Snoring  3.  Other sleep disturbance  4.  Obesity      Patient verbalized understanding of these issues, agrees with the plan and all questions were answered today. Patient was given an opportuntity to voice any other symptoms or concerns not listed above. Patient did not have any other symptoms or concerns.      Patient told to return in one week after the sleep study is interpreted.      Jose Haywood DO  Board Certified in Internal Medicine and Sleep Medicine  [facility].    (Note created with Dragon voice recognition and unintended spelling errors and word substitutions may occur)

## 2021-06-22 NOTE — PROGRESS NOTES
Patient was offered choice of vendor and chose [facility].  Patient Dayday Silva was set up at [facility] on 12/7/18. Patient received a RESMED AIRSENSE 10 AUTOPAP. Pressures were set at [id number].   Patient s ramp is 5 cm H2O for AUTO and FLEX/EPR is 2.  Patient received a AIRFIT Mask name: F30 Size MEDIUM, HEATED tubing and heated humidifier.    Patient set up with house staff and caretaker present. All parties seemed to feel comfortable with machine and mask use and care after the setup was complete.   Gris Tellez

## 2021-06-23 NOTE — PROGRESS NOTES
Assessment and Plan:     1. Routine general medical examination at a health care facility  Routine Medicare wellness visit, updated in EMR.  Fasting labs updated as below.  Immunizations are up-to-date.  Patient receives routine prostate rechecks with urology.  Colonoscopy is up-to-date.  See below for other problem-specific plans.    2. Morbid obesity (H)  Discussed a healthy, low carb, low sugar diet and regular cardiovascular exercise.  Patient will try to get some more regular exercise, he does regularly go to the gym.  He does do some meal preparation, his  assists with grocery shopping.    3. Schizoaffective disorder, unspecified type (H)/Mild cognitive disorder  This is patient's main Medicare diagnosis and he lives in a group home setting.  He has a  along with his  who accompanies him to visits.  His mother is his legal guardian.  We briefly discussed transition planning today as he will likely outlive his parents.  His  states that his mother is unwilling to think about alternative options for guardianship.  Patient does have an adult son and does have regular contact with his son.  Unclear why he could not be back up in the event that something happened to patient's mother.  I asked if patient's mother would be interested in coming to visit to discuss this further,  thinks she would not be open to this.  This will be an ongoing discussion.  - Basic Metabolic Panel    4. Prostate cancer (H)/Urinary incontinence, unspecified type  Patient had prostate surgery in 2016.  He has regular follow-up scheduled with urology, has an appointment next month.  Deferred digital rectal exam and PSA testing today for urology appointment.    5. Moderate episode of recurrent major depressive disorder (H)  Patient follows closely with psychiatry.  He reports his mood has been good and stable.  His PHQ-9 score is within goal  range.    6. Lipid screening  Screening lipid profile updated today.  - Lipid [place] FASTING      The patient's current medical problems were reviewed.    I have had an Advance Directives discussion with the patient.  The following health maintenance schedule was reviewed with the patient and provided in printed form in the after visit summary:   Health Maintenance   Topic Date Due     DEPRESSION FOLLOW UP  1965     COLONOSCOPY  09/19/2021     ADVANCE DIRECTIVES DISCUSSED WITH PATIENT  12/19/2022     TD 18+ HE  07/29/2025     INFLUENZA VACCINE RULE BASED  Completed     TDAP ADULT ONE TIME DOSE  Completed        Subjective:   Chief Complaint: Dayday Silva is an 53 y.o. male here for an Annual Wellness visit.     HPI: Patient presents today with his .  He has no particular questions or concerns.  He reports that he has been feeling well.  He has a part-time job as a  and  at [employer].  He continues to struggle with urinary incontinence, slightly worse after his prostate surgery but present even before that.    Review of Systems:  Please see above.  The rest of the complete review of systems are negative for all systems.    Patient Care Team:  Cheryl Parikh MD as PCP - General (Family Medicine)     Patient Active Problem List   Diagnosis     Class 2 obesity due to excess calories with serious comorbidity and body mass index (BMI) of 36.0 to 36.9 in adult     Chronic Major Depression     Obstructive sleep apnea syndrome     Lower Back Pain Chronic     Allergic Rhinitis     Varicose Veins     Schizoaffective disorder (H)     Myopia, unspecified laterality     Left anterior shoulder pain     Hypophosphatemia     Trouble in sleeping     Urinary incontinence     Prostate cancer (H)     Mild cognitive disorder     Obesity (BMI 35.0-39.9) with comorbidity (H)     Past Medical History:   Diagnosis Date     Allergic Rhinitis      Cancer (H)     Prostate     Chronic Major  Depression      Lower Back Pain Chronic      Obesity      Schizoaffective disorder (H) 6/24/2015      Past Surgical History:   Procedure Laterality Date     HEMORRHOID SURGERY       AL LAP,PROSTATECTOMY,RADICAL,W/NERVE SPARE,INCL ROBOTIC Bilateral 12/7/2016    Procedure: DA ETHAN RADICAL RETROPUBIC PROSTATECTOMY, BILATERAL PELVIC LYMPH NODE DISSECTION ;  Surgeon: Ulisses French MD;  Location: [facility];  Service: Urology     PROSTATE BIOPSY       PROSTATECTOMY  12/07/2016      Family History   Problem Relation Age of Onset     Prostate cancer Paternal Grandfather      No Medical Problems Mother      Cancer Father         lung     Colon cancer Paternal Aunt      Diabetes Maternal Grandfather      Breast cancer Neg Hx      Heart disease Neg Hx       Social History     Socioeconomic History     Marital status: Single     Spouse name: Not on file     Number of children: Not on file     Years of education: Not on file     Highest education level: Not on file   Social Needs     Financial resource strain: Not on file     Food insecurity - worry: Not on file     Food insecurity - inability: Not on file     Transportation needs - medical: Not on file     Transportation needs - non-medical: Not on file   Occupational History     Occupation:      Comment: [employer]   Tobacco Use     Smoking status: Former Smoker     Last attempt to quit: 2016     Years since quitting: 3.0     Smokeless tobacco: Former User   Substance and Sexual Activity     Alcohol use: No     Drug use: No     Sexual activity: No   Other Topics Concern     Not on file   Social History Narrative     Not on file      Current Outpatient Medications   Medication Sig Dispense Refill     ARIPiprazole (ABILIFY) 30 MG tablet Take 30 mg by mouth daily.       aspirin 81 MG EC tablet Take 1 tablet (81 mg total) by mouth daily. 120 tablet 3     benztropine (COGENTIN) 0.5 MG tablet Take 1 tablet (0.5 mg total) by mouth daily. 30 tablet 2      "diaper,brief,adult,disposable Misc Use 1 each As Directed continuous as needed. 120 each 5     disposable gloves Misc Use as needed. 100 each 3     FLUoxetine (PROZAC) 40 MG capsule Take 40 mg by mouth daily.       hydrOXYzine HCl (ATARAX) 25 MG tablet Take 25 mg by mouth 3 (three) times a day as needed for itching.       ketotifen (ZADITOR/ZYRTEC ITCHY EYES) 0.025 % (0.035 %) ophthalmic solution INSTILL ONE DROP IN BOTH EYES UP TO TWICE DAILY AS NEEDED **12 TOTAL FILLS* 5 mL 10     loratadine (CLARITIN) 10 mg tablet Take 1 tablet (10 mg total) by mouth daily. 100 tablet 3     LORazepam (ATIVAN) 0.5 MG tablet TAKE 2 TABLETS (1MG) BY MOUTH EVERY 8 HOURS AS NEEDED FOR ANXIETY OR SLEEP **1 TOTAL FILL* 60 tablet 1     multivitamin with folic acid (ONE DAILY MULTIVITAMIN) 400 mcg Tab Take 1 tablet by mouth daily. 90 tablet 3     oxybutynin (DITROPAN XL) 10 MG ER tablet Take 10 mg by mouth daily.       polyethylene glycol (MIRALAX) 17 gram packet DISSOLVE 1 PACKET INTO LIQUID AND DRINK BY MOUTH EVERY OTHER DAY;INCREASE TO ONCE DAILY DOSE IF CONSTIPATED. 14 each 11     trolamine salicylate-aloe vera (ASPERCREME WITH ALOE) 10 % Crea Apply to left shoulder area 3x daily as needed. 1 Tube 2     venlafaxine (EFFEXOR-XR) 150 MG 24 hr capsule Take 1 capsule (150 mg total) by mouth daily. 30 capsule 5     No current facility-administered medications for this visit.       Objective:   Vital Signs:   Visit Vitals  /68 (Patient Site: Left Arm, Patient Position: Sitting, Cuff Size: Adult Large)   Pulse 80   Resp 20   Ht 5' 4.9\" (1.648 m)   Wt 203 lb 1.6 oz (92.1 kg)   BMI 33.90 kg/m         VisionScreening:  No exam data present     PHYSICAL EXAM  /68 (Patient Site: Left Arm, Patient Position: Sitting, Cuff Size: Adult Large)   Pulse 80   Resp 20   Ht 5' 4.9\" (1.648 m)   Wt 203 lb 1.6 oz (92.1 kg)   BMI 33.90 kg/m      General Appearance:    Alert, cooperative, no distress, appears stated age   Head:    " Normocephalic, without obvious abnormality, atraumatic   Eyes:    PERRL, conjunctiva/corneas clear, EOM's intact both eyes       Ears:   Cerumen impaction bilaterally, unable to remove with curette; TMs not visualized   Nose:   Nares normal, septum midline, mucosa normal, no drainage    or sinus tenderness   Throat:   Lips, mucosa, and tongue normal; teeth and gums normal   Neck:   Supple, symmetrical, trachea midline, no adenopathy;        thyroid:  No enlargement/tenderness/nodules; no carotid    bruit or JVD   Back:     Symmetric, no curvature, ROM normal, no CVA tenderness   Lungs:     Clear to auscultation bilaterally, respirations unlabored   Chest wall:    No tenderness or deformity   Heart:    Regular rate and rhythm, S1 and S2 normal, no murmur, rub    or gallop   Abdomen:     Soft, non-tender, bowel sounds active all four quadrants,     no masses, no organomegaly   Genitalia:    Deferred   Rectal:    Not performed   Extremities:   Extremities normal, atraumatic, no cyanosis or edema   Pulses:   2+ and symmetric all extremities   Skin:   Skin color, texture, turgor normal, no rashes or lesions   Lymph nodes:   Cervical, supraclavicular, and axillary nodes normal   Neurologic:   CNII-XII intact. Normal strength, sensation and reflexes       throughout         Assessment Results 1/15/2019   Activities of Daily Living No help needed   Instrumental Activities of Daily Living 2-4 - Moderate impairment   Mini Cog Total Score 3   Some recent data might be hidden     A Mini-Cog score of 0-2 suggests the possibility of dementia, score of 3-5 suggests no dementia    Identified Health Risks:     The patient reports that he has difficulty with instrumental activities of daily living.  He lives in a group home setting with lots of assistance.  Information on urinary incontinence and treatment options given to patient.  Information regarding advance directives ([place]), including where he can download the  appropriate form, was provided to the patient via the AVS.

## 2021-06-23 NOTE — PROGRESS NOTES
Dear Dr. Parikh, Cheryl Bernardo MD  [address]  [place] [address],    Thank you for the opportunity to participate in the care of Dayday Silva.     He is a 53 y.o. y/o male patient who comes to the sleep medicine clinic for follow up.  This is the patient's first clinical visit since starting his new CPAP machine.  He is having some issues with his current mask and upon further inspection he has been putting the mask on incorrectly.  This is the main reason why his usage has decreased.    Compliance Download data for 30 days:  Pressure setting: APAP 9-15 CWP  Residual AHI: 4.5 events per hour  Leak: Minimal  Compliance: 33%  Mask Tolerance: Good  Skin irritation: None      Past Medical History:   Diagnosis Date     Allergic Rhinitis      Cancer (H)     Prostate     Chronic Major Depression      Class 2 obesity due to excess calories with serious comorbidity and body mass index (BMI) of 36.0 to 36.9 in adult     Created by Conversion      Lower Back Pain Chronic      Obesity      Schizoaffective disorder (H) 6/24/2015       Past Surgical History:   Procedure Laterality Date     HEMORRHOID SURGERY       HI LAP,PROSTATECTOMY,RADICAL,W/NERVE SPARE,INCL ROBOTIC Bilateral 12/7/2016    Procedure: DA ETHAN RADICAL RETROPUBIC PROSTATECTOMY, BILATERAL PELVIC LYMPH NODE DISSECTION ;  Surgeon: Ulisses French MD;  [place] [facility] [place]  Service: Urology     PROSTATE BIOPSY       PROSTATECTOMY  12/07/2016       Social History     Socioeconomic History     Marital status: Single     Spouse name: Not on file     Number of children: Not on file     Years of education: Not on file     Highest education level: Not on file   Social Needs     Financial resource strain: Not on file     Food insecurity - worry: Not on file     Food insecurity - inability: Not on file     Transportation needs - medical: Not on file     Transportation needs - non-medical: Not on file   Occupational History     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       aspirin 81 MG EC tablet Take 1 tablet (81 mg total) by mouth daily. 120 tablet 3     benztropine (COGENTIN) 0.5 MG tablet Take 1 tablet (0.5 mg total) by mouth daily. 30 tablet 2     diaper,brief,adult,disposable Misc Use 1 each As Directed continuous as needed. 120 each 5     disposable gloves Misc Use as needed. 100 each 3     FLUoxetine (PROZAC) 40 MG capsule Take 40 mg by mouth daily.       hydrOXYzine HCl (ATARAX) 25 MG tablet Take 25 mg by mouth 3 (three) times a day as needed for itching.       ketotifen (ZADITOR/ZYRTEC ITCHY EYES) 0.025 % (0.035 %) ophthalmic solution INSTILL ONE DROP IN BOTH EYES UP TO TWICE DAILY AS NEEDED **12 TOTAL FILLS* 5 mL 10     loratadine (CLARITIN) 10 mg tablet Take 1 tablet (10 mg total) by mouth daily. 100 tablet 3     LORazepam (ATIVAN) 0.5 MG tablet TAKE 2 TABLETS (1MG) BY MOUTH EVERY 8 HOURS AS NEEDED FOR ANXIETY OR SLEEP **1 TOTAL FILL* 60 tablet 1     multivitamin with folic acid (ONE DAILY MULTIVITAMIN) 400 mcg Tab Take 1 tablet by mouth daily. 90 tablet 3     oxybutynin (DITROPAN XL) 10 MG ER tablet Take 10 mg by mouth daily.       polyethylene glycol (MIRALAX) 17 gram packet DISSOLVE 1 PACKET INTO LIQUID AND DRINK BY MOUTH EVERY OTHER DAY;INCREASE TO ONCE DAILY DOSE IF CONSTIPATED. 14 each 11     trolamine salicylate-aloe vera (ASPERCREME WITH ALOE) 10 % Crea Apply to left shoulder area 3x daily as needed. 1 Tube 2     venlafaxine (EFFEXOR-XR) 150 MG 24 hr capsule Take 1 capsule (150 mg  "total) by mouth daily. 30 capsule 5     No current facility-administered medications for this visit.        No Known Allergies    Epworths Sleepiness Scale 10/18/2018 1/25/2019   Sitting and reading 2 2   Watching TV 3 2   Sitting, inactive in a public place (e.g. a theatre or a meeting) 2 3   As a passenger in a car for an hour without a break 3 2   Lying down to rest in the afternoon when circumstances permit 1 1   Sitting and talking to someone 0 2   Sitting quietly after a lunch without alcohol 1 1   In a car, while stopped for a few minutes in traffic 0 1   Total score 12 14       Physical Exam:  /70   Pulse 86   Ht 5' 6\" (1.676 m)   Wt 206 lb 12.8 oz (93.8 kg)   SpO2 95%   BMI 33.38 kg/m    BMI:Body mass index is 33.38 kg/m .   GEN: NAD, obese  Psych: normal mood, normal affect    Labs/Studies:     I reviewed the efficacy and compliance report from his device. Data summarized on the HPI and the CPAP compliance flow sheet.     Lab Results   Component Value Date    WBC 7.1 12/07/2016    HGB 12.9 (L) 12/08/2016    HCT 43.1 12/07/2016    MCV 88 12/07/2016     12/07/2016         Chemistry        Component Value Date/Time     01/15/2019 1222    K 4.3 01/15/2019 1222     01/15/2019 1222    CO2 27 01/15/2019 1222    BUN 12 01/15/2019 1222    CREATININE 0.86 01/15/2019 1222    GLU 89 01/15/2019 1222        Component Value Date/Time    CALCIUM 9.0 01/15/2019 1222    ALKPHOS 72 09/11/2016 1633    AST 23 09/11/2016 1633    ALT 29 09/11/2016 1633    BILITOT 1.2 (H) 09/11/2016 1633            No results found for: FERRITIN         Assessment and Plan:  In summary Dayday Silva is a 53 y.o. year old male who is here for compliance download review.    1.  Obstructive sleep apnea on CPAP  I encouraged the patient try to increase his hours of usage and so far as he can.  He states that the current pressure settings a bit too low so I increased it to 10-15 CWP.  I had the patient test this out in " front of me and he felt it was comfortable.  I also gave the patient some coaching and how to properly use his machine and mask.  Advised the patient to undergo pressure desensitization protocol because we are running out of time.  2.  Other sleep disturbance     Patient verbalized understanding of these issues, agrees with the plan and all questions were answered today. Patient was given an opportuntity to voice any other symptoms or concerns not listed above. Patient did not have any other symptoms or concerns.      Patient told to return in 6 weeks. Patient instructed to stop at  to schedule appointment before leaving today.    Jose Haywood DO  Board Certified in Internal Medicine and Sleep Medicine  [facility].    I spent a total of 15 minutes of face-to-face encounter and more than 50% of the encounter was used for counseling or coordination of care.    (Note created with Dragon voice recognition and unintended spelling errors and word substitutions may occur)

## 2021-06-23 NOTE — PROGRESS NOTES
HPI: This patient presents to clinic today for cerumen removal. Has noticed some fullness of the ears and muffled hearing, but denies otalgia, otorrhea, vertigo, change in true hearing or tinnitus. Denies other symptoms.    Past medical history, surgical history, family history, social history, medications, and allergies have been reviewed and are documented above.     Review of Systems: a 10-system review was performed. Symptoms are noted in the HPI and on a scanned document in the computer chart.    Physical Examination:   GEN: no acute distress, alert and oriented  EYES: extraocular movements intact, pupils equal and round. Sclera clear.   EARS: bilateral cerumen impactions cleared under binocular microscopy using suction. The tympanic membranes are noted to be intact and clear with no signs of infections, effusions, perforations, or retractions.  PULM: breathing comfortably on room air with no stertor or stridor. Chest expansion symmetric.  HEART: regular rate and rhythm, no peripheral edema    MEDICAL DECISION-MAKING: cerumen impactions cleared under binocular microscopy without difficulty. Hearing restored to baseline. Follow-up PRN.

## 2021-06-23 NOTE — PATIENT INSTRUCTIONS - HE
Pressure Desensitization Protocol    1.  Put the mask on your face without putting the straps on while doing an activity that you enjoy such as watching TV, listening to the radio, surfing the Internet, or reading.  Try to do this for 15 minutes a day for one week.    2.  Put the mask on your face with the straps on while doing activity that you enjoy.  Try to do this for 15 minutes a day for another week.    3.  Put the mask on and attach the hose to the machine and turn on the machine.  Try to focus on activities that you enjoy for 15 minutes.  Perform this activity for one week.    4.  The mask on and attach the hose in the machine while doing an activity as you enjoy for 30 minutes.  Try this for one week.    5.  Repeat step 4.  Until you can increase the hours of usage to 2 hours.    May go back to previous steps if there is any discomfort at any time.  Also try to sleep with the machine every night for as long as you can tolerate it.    Please bring your machine, mask, hose and power cord on the next clinical visit with me. Thank you.

## 2021-07-03 NOTE — ADDENDUM NOTE
Addendum Note by Cheryl Parikh MD at 1/30/2017  5:30 PM     Author: Cheryl Parikh MD Service: -- Author Type: Physician    Filed: 1/30/2017  5:30 PM Encounter Date: 1/30/2017 Status: Signed    : Cheryl Parikh MD (Physician)    Addended by: CHERYL PARIKH on: 1/30/2017 05:30 PM        Modules accepted: Orders

## 2021-11-04 ENCOUNTER — HOSPITAL ENCOUNTER (OUTPATIENT)
Facility: AMBULATORY SURGERY CENTER | Age: 56
End: 2021-11-04
Attending: COLON & RECTAL SURGERY

## 2021-11-04 DIAGNOSIS — Z11.59 ENCOUNTER FOR SCREENING FOR OTHER VIRAL DISEASES: ICD-10-CM

## 2021-11-15 DIAGNOSIS — G47.33 OBSTRUCTIVE SLEEP APNEA (ADULT) (PEDIATRIC): Primary | ICD-10-CM

## 2021-11-23 ENCOUNTER — ANESTHESIA EVENT (OUTPATIENT)
Dept: SURGERY | Facility: AMBULATORY SURGERY CENTER | Age: 56
End: 2021-11-23

## 2021-11-23 RX ORDER — SODIUM CHLORIDE, SODIUM LACTATE, POTASSIUM CHLORIDE, CALCIUM CHLORIDE 600; 310; 30; 20 MG/100ML; MG/100ML; MG/100ML; MG/100ML
INJECTION, SOLUTION INTRAVENOUS CONTINUOUS
Status: CANCELLED | OUTPATIENT
Start: 2021-11-23

## 2021-11-23 RX ORDER — LIDOCAINE 40 MG/G
CREAM TOPICAL
Status: CANCELLED | OUTPATIENT
Start: 2021-11-23

## 2021-11-24 ENCOUNTER — ANESTHESIA (OUTPATIENT)
Dept: SURGERY | Facility: AMBULATORY SURGERY CENTER | Age: 56
End: 2021-11-24

## 2021-12-13 ENCOUNTER — VIRTUAL VISIT (OUTPATIENT)
Dept: FAMILY MEDICINE | Facility: CLINIC | Age: 56
End: 2021-12-13

## 2021-12-13 DIAGNOSIS — G47.33 OBSTRUCTIVE SLEEP APNEA SYNDROME: ICD-10-CM

## 2021-12-13 DIAGNOSIS — F25.9 SCHIZOAFFECTIVE DISORDER, UNSPECIFIED TYPE (H): Primary | Chronic | ICD-10-CM

## 2021-12-13 DIAGNOSIS — F09 MILD COGNITIVE DISORDER: ICD-10-CM

## 2021-12-13 PROCEDURE — 99213 OFFICE O/P EST LOW 20 MIN: CPT | Mod: 95 | Performed by: FAMILY MEDICINE

## 2021-12-13 NOTE — PROGRESS NOTES
"Dayday is a 56 year old who is being evaluated via a billable video visit.      How would you like to obtain your AVS? MyChart  If the video visit is dropped, the invitation should be resent by: Call at [phone]   Will anyone else be joining your video visit? No      Video Start Time: 2:09 PM      Assessment & Plan     Schizoaffective disorder, unspecified type (H)  Patient follows closely with psychiatry.  We do not receive regular updates, will have him sign a release for these records at his next complete physical exam.  We will have my staff reach out to patient's group home to help him get scheduled.    Mild cognitive disorder  Patient lives in a group home and has a .  His mother is his legal guardian.  He was a bit confused about today's visit, unclear who made the appointment.  He proceeded with the appointment without assistance.    Obstructive sleep apnea syndrome  Patient states that he wears his CPAP \"sometimes\".  He has a prescription for lorazepam to help him better tolerate CPAP.  Emphasized the importance of wearing this every night.           Return in about 4 weeks (around 1/10/2022) for Routine preventive.    Cheryl Parikh MD  [facility]    Dede Naylor is a 56 year old who presents for the following health issues     HPI     Patient presents today for a virtual visit to discuss \"sleep issues\".  It is unclear who made the appointment, but patient was a bit confused about the purpose of the appointment today.  He was also unable to do this via video, and so this visit was converted to a telephone appointment.  Patient states that he has trouble sleeping.  It is unclear if he has trouble falling asleep, staying asleep, or both.  He does have a psychiatrist.  Initially he states that he just started seeing a new psychiatrist and has only met her once.  It sounds like this is actually his therapist.  He is unsure when he is due to meet with his " "psychiatrist again.  He does have CPAP for obstructive sleep apnea that he says he wears \"sometimes\".  He does not tolerate this well, he \"feels like it is choking him\".  Patient is also overdue for a complete physical exam.  He typically presents to physicals with a .  He has no other questions or concerns today.     Review of Systems   Constitutional, HEENT, cardiovascular, pulmonary, gi and gu systems are negative, except as otherwise noted.      Objective           Vitals:  No vitals were obtained today due to virtual visit.    Physical Exam   GENERAL: Healthy, alert and no distress  RESP: No audible wheeze, cough, or shortness of breath.     PSYCH: Affect sounds flat, recent memory moderately impaired        Video-Visit Details    Type of service:  Telephone Visit    Video End Time:2:15 PM    Originating Location (pt. Location): Other custodial    Distant Location (provider location):  [facility]     Platform used for Video Visit: Unable to complete video visit, converted to phone  "

## 2021-12-13 NOTE — PATIENT INSTRUCTIONS
I would recommend wearing your CPAP machine every night.  I believe you have medication on your medication list (lorazepam) to help you sleep and better tolerate the CPAP.  I would recommend talking to your psychiatrist about your sleep issues.

## 2022-01-16 ENCOUNTER — HEALTH MAINTENANCE LETTER (OUTPATIENT)
Age: 57
End: 2022-01-16

## 2022-02-15 ENCOUNTER — OFFICE VISIT (OUTPATIENT)
Dept: FAMILY MEDICINE | Facility: CLINIC | Age: 57
End: 2022-02-15
Payer: COMMERCIAL

## 2022-02-15 VITALS
HEART RATE: 88 BPM | WEIGHT: 247.25 LBS | RESPIRATION RATE: 20 BRPM | DIASTOLIC BLOOD PRESSURE: 73 MMHG | SYSTOLIC BLOOD PRESSURE: 113 MMHG | BODY MASS INDEX: 41.19 KG/M2 | HEIGHT: 65 IN

## 2022-02-15 DIAGNOSIS — K62.1 COLORECTAL POLYPS: ICD-10-CM

## 2022-02-15 DIAGNOSIS — F09 MILD COGNITIVE DISORDER: ICD-10-CM

## 2022-02-15 DIAGNOSIS — Z12.5 SCREENING FOR MALIGNANT NEOPLASM OF PROSTATE: ICD-10-CM

## 2022-02-15 DIAGNOSIS — Z00.00 ENCOUNTER FOR MEDICARE ANNUAL WELLNESS EXAM: Primary | ICD-10-CM

## 2022-02-15 DIAGNOSIS — G47.33 OBSTRUCTIVE SLEEP APNEA SYNDROME: ICD-10-CM

## 2022-02-15 DIAGNOSIS — E66.01 MORBID OBESITY (H): ICD-10-CM

## 2022-02-15 DIAGNOSIS — Z11.59 ENCOUNTER FOR HEPATITIS C SCREENING TEST FOR LOW RISK PATIENT: ICD-10-CM

## 2022-02-15 DIAGNOSIS — F25.9 SCHIZOAFFECTIVE DISORDER, UNSPECIFIED TYPE (H): Chronic | ICD-10-CM

## 2022-02-15 DIAGNOSIS — K63.5 COLORECTAL POLYPS: ICD-10-CM

## 2022-02-15 DIAGNOSIS — Z85.46 HISTORY OF PROSTATE CANCER: ICD-10-CM

## 2022-02-15 LAB
ALBUMIN SERPL-MCNC: 3.7 G/DL (ref 3.5–5)
ALP SERPL-CCNC: 89 U/L (ref 45–120)
ALT SERPL W P-5'-P-CCNC: 31 U/L (ref 0–45)
ANION GAP SERPL CALCULATED.3IONS-SCNC: 9 MMOL/L (ref 5–18)
AST SERPL W P-5'-P-CCNC: 24 U/L (ref 0–40)
BILIRUB SERPL-MCNC: 0.5 MG/DL (ref 0–1)
BUN SERPL-MCNC: 12 MG/DL (ref 8–22)
CALCIUM SERPL-MCNC: 9.3 MG/DL (ref 8.5–10.5)
CHLORIDE BLD-SCNC: 105 MMOL/L (ref 98–107)
CHOLEST SERPL-MCNC: 207 MG/DL
CO2 SERPL-SCNC: 24 MMOL/L (ref 22–31)
CREAT SERPL-MCNC: 0.94 MG/DL (ref 0.7–1.3)
FASTING STATUS PATIENT QL REPORTED: YES
GFR SERPL CREATININE-BSD FRML MDRD: >90 ML/MIN/1.73M2
GLUCOSE BLD-MCNC: 83 MG/DL (ref 70–125)
HBA1C MFR BLD: 5.5 % (ref 0–5.6)
HCV AB SERPL QL IA: NEGATIVE
HDLC SERPL-MCNC: 32 MG/DL
LDLC SERPL CALC-MCNC: 122 MG/DL
POTASSIUM BLD-SCNC: 4.6 MMOL/L (ref 3.5–5)
PROT SERPL-MCNC: 7 G/DL (ref 6–8)
PSA SERPL-MCNC: <0.1 UG/L (ref 0–3.5)
SODIUM SERPL-SCNC: 138 MMOL/L (ref 136–145)
TRIGL SERPL-MCNC: 265 MG/DL

## 2022-02-15 PROCEDURE — 83036 HEMOGLOBIN GLYCOSYLATED A1C: CPT | Performed by: FAMILY MEDICINE

## 2022-02-15 PROCEDURE — 99396 PREV VISIT EST AGE 40-64: CPT | Performed by: FAMILY MEDICINE

## 2022-02-15 PROCEDURE — 80061 LIPID PANEL: CPT | Performed by: FAMILY MEDICINE

## 2022-02-15 PROCEDURE — G0103 PSA SCREENING: HCPCS | Performed by: FAMILY MEDICINE

## 2022-02-15 PROCEDURE — 80053 COMPREHEN METABOLIC PANEL: CPT | Performed by: FAMILY MEDICINE

## 2022-02-15 PROCEDURE — 36415 COLL VENOUS BLD VENIPUNCTURE: CPT | Performed by: FAMILY MEDICINE

## 2022-02-15 PROCEDURE — 86803 HEPATITIS C AB TEST: CPT | Performed by: FAMILY MEDICINE

## 2022-02-15 RX ORDER — FLUTICASONE PROPIONATE 50 MCG
2 SPRAY, SUSPENSION (ML) NASAL DAILY
COMMUNITY
Start: 2021-11-29

## 2022-02-15 RX ORDER — GABAPENTIN 300 MG/1
300 CAPSULE ORAL 3 TIMES DAILY
COMMUNITY
Start: 2022-01-21

## 2022-02-15 RX ORDER — ARIPIPRAZOLE 5 MG/1
5 TABLET ORAL AT BEDTIME
COMMUNITY
Start: 2022-01-21 | End: 2023-06-30

## 2022-02-15 RX ORDER — LORAZEPAM 1 MG/1
0.5 TABLET ORAL 2 TIMES DAILY
Status: ON HOLD | COMMUNITY
Start: 2022-02-04 | End: 2023-07-03 | Stop reason: DRUGHIGH

## 2022-02-15 RX ORDER — DOXEPIN 3 MG/1
3 TABLET, FILM COATED ORAL AT BEDTIME
COMMUNITY
Start: 2022-01-26

## 2022-02-15 RX ORDER — ARIPIPRAZOLE 30 MG/1
30 TABLET ORAL AT BEDTIME
COMMUNITY
Start: 2022-01-21

## 2022-02-15 RX ORDER — HYDROXYZINE HYDROCHLORIDE 50 MG/1
50 TABLET, FILM COATED ORAL AT BEDTIME
Status: ON HOLD | COMMUNITY
Start: 2022-01-21 | End: 2023-07-03 | Stop reason: DRUGHIGH

## 2022-02-15 ASSESSMENT — ACTIVITIES OF DAILY LIVING (ADL)
CURRENT_FUNCTION: PREPARING MEALS REQUIRES ASSISTANCE
CURRENT_FUNCTION: MEDICATION ADMINISTRATION REQUIRES ASSISTANCE
CURRENT_FUNCTION: TRANSPORTATION REQUIRES ASSISTANCE
CURRENT_FUNCTION: SHOPPING REQUIRES ASSISTANCE
CURRENT_FUNCTION: MONEY MANAGEMENT REQUIRES ASSISTANCE

## 2022-02-15 ASSESSMENT — MIFFLIN-ST. JEOR: SCORE: 1875.3

## 2022-02-15 NOTE — PROGRESS NOTES
"SUBJECTIVE:   Dayday Silva is a 56 year old male who presents for Preventive Visit.      Patient has been advised of split billing requirements and indicates understanding: Yes  Are you in the first 12 months of your Medicare coverage?  No    Healthy Habits:     In general, how would you rate your overall health?  Good    Frequency of exercise:  None    Do you usually eat at least 4 servings of fruit and vegetables a day, include whole grains    & fiber and avoid regularly eating high fat or \"junk\" foods?  Yes    Taking medications regularly:  Yes    Medication side effects:  None    Ability to successfully perform activities of daily living:  Transportation requires assistance, shopping requires assistance, preparing meals requires assistance, medication administration requires assistance and money management requires assistance    Home Safety:  No safety concerns identified    Hearing Impairment:  No hearing concerns    In the past 6 months, have you been bothered by leaking of urine? Yes    In general, how would you rate your overall mental or emotional health?  Good      PHQ-2 Total Score: 1    Additional concerns today:  No    Do you feel safe in your environment? Yes    Have you ever done Advance Care Planning? (For example, a Health Directive, POLST, or a discussion with a medical provider or your loved ones about your wishes): No, advance care planning information given to patient to review.  Patient plans to discuss their wishes with loved ones or provider.         Fall risk  Fall Risk Assessment not completed.    Cognitive Screening   1) Repeat 3 items (Leader, Season, Table)    2) Clock draw: ABNORMAL  3) 3 item recall: Recalls 1 object   Results: ABNORMAL clock, 1-2 items recalled: PROBABLE COGNITIVE IMPAIRMENT, **INFORM PROVIDER**    Mini-CogTM Copyright SAQIB Ramos. Licensed by the author for use in [facility]; reprinted with permission (anisha@.[facility]). All rights reserved.  " "        Reviewed and updated as needed this visit by clinical staff  Tobacco  Allergies              Reviewed and updated as needed this visit by Provider  Tobacco  Allergies  Meds   Med Hx  Surg Hx  Fam Hx  Soc Hx        Social History     Tobacco Use     Smoking status: Former Smoker     Quit date: 2016     Years since quittin.1     Smokeless tobacco: Former User   Substance Use Topics     Alcohol use: No         Alcohol Use 2/15/2022   Prescreen: >3 drinks/day or >7 drinks/week? Not Applicable       PROBLEMS TO ADD ON...  1. Wondering about getting on a diet.  Group home staff cooks meals.  Feels that he snacks quite a bit between meals.  Things like chips.  Not getting any exercise lately due to cold weather.  Wondering about a \"diet pill\".    Current providers sharing in care for this patient include:   Patient Care Team:  Cheryl Parikh MD as PCP - General (Family Practice)  Patrice Washington DPM as Assigned Musculoskeletal Provider  Cheryl Parikh MD as Assigned PCP    The following health maintenance items are reviewed in Epic and correct as of today:  Health Maintenance Due   Topic Date Due     ANNUAL REVIEW OF  ORDERS  Never done     DEPRESSION ACTION PLAN  Never done     HEPATITIS C SCREENING  Never done     ZOSTER IMMUNIZATION (1 of 2) Never done     LUNG CANCER SCREENING  Never done     PHQ-9  2020     MEDICARE ANNUAL WELLNESS VISIT  2021     COLORECTAL CANCER SCREENING  2021     BP Readings from Last 3 Encounters:   02/15/22 113/73   20 106/60   20 112/68    Wt Readings from Last 3 Encounters:   02/15/22 112.2 kg (247 lb 4 oz)   20 103.9 kg (229 lb 2 oz)   20 103.7 kg (228 lb 9.6 oz)                  Patient Active Problem List   Diagnosis     Chronic Major Depression     Obstructive sleep apnea syndrome     Lower Back Pain Chronic     Allergic Rhinitis     Varicose Veins     Schizoaffective disorder (H)     Myopia, " unspecified laterality     Left anterior shoulder pain     Hypophosphatemia     Trouble in sleeping     Urinary incontinence     History of prostate cancer     Mild cognitive disorder     Morbid obesity (H)     Ingrown nail     Past Surgical History:   Procedure Laterality Date     HEMORRHOID SURGERY       CT LAP,PROSTATECTOMY,RADICAL,W/NERVE SPARE,INCL ROBOTIC Bilateral 2016    Procedure: DA ETHAN RADICAL RETROPUBIC PROSTATECTOMY, BILATERAL PELVIC LYMPH NODE DISSECTION ;  Surgeon: Ulisses French MD;  Location: [facility];  Service: Urology     PROSTATE BIOPSY       PROSTATECTOMY  2016       Social History     Tobacco Use     Smoking status: Former Smoker     Packs/day: 1.00     Years: 32.00     Pack years: 32.00     Types: Cigarettes     Start date: 1984     Quit date: 2016     Years since quittin.1     Smokeless tobacco: Former User   Substance Use Topics     Alcohol use: Yes     Comment: occasional     Family History   Problem Relation Age of Onset     Prostate Cancer Paternal Grandfather      No Known Problems Mother      Cancer Father         lung     Colon Cancer Paternal Aunt      Diabetes Maternal Grandfather      Breast Cancer No family hx of      Heart Disease No family hx of          Current Outpatient Medications   Medication Sig Dispense Refill     acetaminophen (TYLENOL) 500 MG tablet Take 500-1,000 mg by mouth        ARIPiprazole (ABILIFY) 20 MG tablet Take 20 mg by mouth At Bedtime       ARIPiprazole (ABILIFY) 5 MG tablet Take 5 mg by mouth At Bedtime       diaper,brief,adult,disposable Misc [DIAPER,BRIEF,ADULT,DISPOSABLE MISC] Use 1 each As Directed continuous as needed. 120 each 5     disposable gloves Misc [DISPOSABLE GLOVES MISC] Use as needed. 100 each 3     doxepin (SILENOR) 3 MG tablet Take 3 mg by mouth At Bedtime       FLUoxetine (PROZAC) 40 MG capsule Take 60 mg by mouth daily        fluticasone (FLONASE) 50 MCG/ACT nasal spray Spray 2 sprays into both  "nostrils daily       gabapentin (NEURONTIN) 300 MG capsule Take 300 mg by mouth 3 times daily       hydrOXYzine (ATARAX) 50 MG tablet Take 50 mg by mouth At Bedtime       ketotifen (ZADITOR/ZYRTEC ITCHY EYES) 0.025 % (0.035 %) ophthalmic solution [KETOTIFEN (ZADITOR/ZYRTEC ITCHY EYES) 0.025 % (0.035 %) OPHTHALMIC SOLUTION] INSTILL ONE DROP IN BOTH EYES UP TO TWICE DAILY AS NEEDED **12 TOTAL FILLS* 5 mL 10     loratadine (CLARITIN) 10 mg tablet [LORATADINE (CLARITIN) 10 MG TABLET] Take 1 tablet (10 mg total) by mouth daily. 100 tablet 3     LORazepam (ATIVAN) 1 MG tablet Take 1 mg by mouth as needed       mirabegron (MYRBETRIQ) 50 mg Tb24 ER tablet [MIRABEGRON (MYRBETRIQ) 50 MG TB24 ER TABLET] Take 50 mg by mouth daily.       multivitamin with folic acid (ONE DAILY MULTIVITAMIN) 400 mcg Tab [MULTIVITAMIN WITH FOLIC ACID (ONE DAILY MULTIVITAMIN) 400 MCG TAB] Take 1 tablet by mouth daily. 90 tablet 3     polyethylene glycol (MIRALAX) 17 gram packet [POLYETHYLENE GLYCOL (MIRALAX) 17 GRAM PACKET] DISSOLVE 1 PACKET INTO LIQUID AND DRINK BY MOUTH EVERY OTHER DAY;INCREASE TO ONCE DAILY DOSE IF CONSTIPATED. 14 each 11     TOVIAZ 4 mg Tb24 ER tablet [TOVIAZ 4 MG TB24 ER TABLET]        trolamine salicylate-aloe vera (ASPERCREME WITH ALOE) 10 % Crea [TROLAMINE SALICYLATE-ALOE VERA (ASPERCREME WITH ALOE) 10 % CREA] Apply to left shoulder area 3x daily as needed. 1 Tube 2     No Known Allergies          Review of Systems  Constitutional, HEENT, cardiovascular, pulmonary, GI, , musculoskeletal, neuro, skin, endocrine and psych systems are negative, except as otherwise noted.    OBJECTIVE:   /73 (BP Location: Left arm, Patient Position: Sitting, Cuff Size: Adult Large)   Pulse 88   Resp 20   Ht 1.654 m (5' 5.12\")   Wt 112.2 kg (247 lb 4 oz)   BMI 40.99 kg/m   Estimated body mass index is 40.99 kg/m  as calculated from the following:    Height as of this encounter: 1.654 m (5' 5.12\").    Weight as of this encounter: " 112.2 kg (247 lb 4 oz).  Physical Exam  GENERAL: healthy, alert and no distress  EYES: Eyes grossly normal to inspection, PERRL and conjunctivae and sclerae normal  HENT: ear canals and TM's normal, nose and mouth without ulcers or lesions  NECK: no adenopathy, no asymmetry, masses, or scars and thyroid normal to palpation  RESP: lungs clear to auscultation - no rales, rhonchi or wheezes  CV: regular rate and rhythm, normal S1 S2, no S3 or S4, no murmur, click or rub, no peripheral edema and peripheral pulses strong  ABDOMEN: soft, nontender, no hepatosplenomegaly, no masses and bowel sounds normal  MS: no gross musculoskeletal defects noted, no edema  SKIN: no suspicious lesions or rashes  NEURO: Normal strength and tone, mentation intact and speech normal  PSYCH: mentation appears normal, affect normal/bright    Diagnostic Test Results:  Labs reviewed in Epic  Pending at time of note    ASSESSMENT / PLAN:   1. Encounter for Medicare annual wellness exam  Routine Medicare wellness visit, updated in EMR.  Nonfasting labs updated as below.  Patient is overdue for colon cancer screening, referral placed again today.  Unclear if patient has contacted through his personal cell phone or his group home, but he will certainly need some help coordinating a repeat colonoscopy.  Will refer him for care coordination.  Immunizations are up-to-date.  Plan repeat physical in 1 year, recommended recheck in 6 months for mood and weight.  - Comprehensive metabolic panel (BMP + Alb, Alk Phos, ALT, AST, Total. Bili, TP)  - Hemoglobin A1c  - Lipid panel reflex to direct LDL Fasting    2. Morbid obesity (H)  Patient is not able to read, but I did write out some dietary instructions in his after visit summary for his  to read to him and emphasize.  Could consider Metformin which might counteract some of the weight gaining properties of his psychiatric medications.  Will await his labs and A1c prior to making this  decision.  - Comprehensive metabolic panel (BMP + Alb, Alk Phos, ALT, AST, Total. Bili, TP)  - Hemoglobin A1c  - Lipid panel reflex to direct LDL Fasting    3. Schizoaffective disorder, unspecified type (H)  Patient follows closely with psychiatry.  He reports his mood has been good.  He seems a bit more confused today than usual, but not fatigued or disoriented.  - Comprehensive metabolic panel (BMP + Alb, Alk Phos, ALT, AST, Total. Bili, TP)    4. Mild cognitive disorder  Patient lives in a group home setting.  He has a personal cell phone and did not have anyone accompany him into today's visit.  He does not know largely his medical history or recent care.  Will refer to care coordination as coordinating future visits might be difficult as well.  He may need some assistance in scheduling his upcoming colonoscopy.    5. Obstructive sleep apnea syndrome  Emphasized the importance of wearing his CPAP every night if he can.  He wears this only periodically.    6. Colorectal polyps  Patient is overdue for follow-up colonoscopy.  Referral placed again today.  He has had some trouble rescheduling this he states.  - Adult Gastro Ref - Procedure Only; Future    7. History of prostate cancer  8. Screening for malignant neoplasm of prostate  PSA drawn today as I am unable to access any recent records from urology.  He thinks he is still being seen with urology yearly.  - PSA, screen    9. Encounter for hepatitis C screening test for low risk patient  Hepatitis C screening completed today in low risk patient.  - Hepatitis C antibody      Patient has been advised of split billing requirements and indicates understanding: Yes    COUNSELING:  Reviewed preventive health counseling, as reflected in patient instructions  Special attention given to:       Regular exercise       Healthy diet/nutrition       Vision screening       Hepatitis C screening       Colon cancer screening       Prostate cancer screening    Estimated body  "mass index is 40.99 kg/m  as calculated from the following:    Height as of this encounter: 1.654 m (5' 5.12\").    Weight as of this encounter: 112.2 kg (247 lb 4 oz).    Weight management plan: Discussed healthy diet and exercise guidelines    He reports that he quit smoking about 6 years ago. He has quit using smokeless tobacco.      Appropriate preventive services were discussed with this patient, including applicable screening as appropriate for cardiovascular disease, diabetes, osteopenia/osteoporosis, and glaucoma.  As appropriate for age/gender, discussed screening for colorectal cancer, prostate cancer, breast cancer, and cervical cancer. Checklist reviewing preventive services available has been given to the patient.    Reviewed patients plan of care and provided an AVS. The Basic Care Plan (routine screening as documented in Health Maintenance) for Dayday meets the Care Plan requirement. This Care Plan has been established and reviewed with the Patient.    Counseling Resources:  ATP IV Guidelines  Pooled Cohorts Equation Calculator  Breast Cancer Risk Calculator  Breast Cancer: Medication to Reduce Risk  FRAX Risk Assessment  ICSI Preventive Guidelines  Dietary Guidelines for Americans, 2010  USDA's MyPlate  ASA Prophylaxis  Lung CA Screening    Cheryl Parikh MD  [facility]    Identified Health Risks:    He is at risk for lack of exercise and has been provided with information to increase physical activity for the benefit of his well-being.  The patient reports that he has difficulty with activities of daily living. I have asked that the patient make a follow up appointment as needed where this issue will be further evaluated and addressed.  Information on urinary incontinence and treatment options given to patient.  "

## 2022-02-15 NOTE — PATIENT INSTRUCTIONS
Focus on vegetables and protein (meat, fish, eggs, dairy) with each meal.  Try to minimize carbohydrates (bread, rice, pasta, sweets).  Try to get 30 minutes of exercise every day.  Walking is great.      Patient Education   Personalized Prevention Plan  You are due for the preventive services outlined below.  Your care team is available to assist you in scheduling these services.  If you have already completed any of these items, please share that information with your care team to update in your medical record.  Health Maintenance Due   Topic Date Due     ANNUAL REVIEW OF HM ORDERS  Never done     Depression Action Plan  Never done     Hepatitis C Screening  Never done     Zoster (Shingles) Vaccine (1 of 2) Never done     LUNG CANCER SCREENING  Never done     Depression Assessment  07/09/2020     Colorectal Cancer Screening  09/19/2021       Exercise for a Healthier Heart  You may wonder how you can improve the health of your heart. If you re thinking about exercise, you re on the right track. You don t need to become an athlete. But you do need a certain amount of brisk exercise to help strengthen your heart. If you have been diagnosed with a heart condition, your healthcare provider may advise exercise to help stabilize your condition. To help make exercise a habit, choose safe, fun activities.      Exercise with a friend. When activity is fun, you're more likely to stick with it.   Before you start  Check with your healthcare provider before starting an exercise program. This is especially important if you have not been active for a while. It's also important if you have a long-term (chronic) health problem such as heart disease, diabetes, or obesity. Or if you are at high risk for having these problems.   Why exercise?  Exercising regularly offers many healthy rewards. It can help you do all of the following:     Improve your blood cholesterol level to help prevent further heart trouble    Lower your blood  pressure to help prevent a stroke or heart attack    Control diabetes, or reduce your risk of getting this disease    Improve your heart and lung function    Reach and stay at a healthy weight    Make your muscles stronger so you can stay active    Prevent falls and fractures by slowing the loss of bone mass (osteoporosis)    Manage stress better    Reduce your blood pressure    Improve your sense of self and your body image  Exercise tips      Ease into your routine. Set small goals. Then build on them. If you are not sure what your activity level should be, talk with your healthcare provider first before starting an exercise routine.    Exercise on most days. Aim for a total of 150 minutes (2 hours and 30 minutes) or more of moderate-intensity aerobic activity each week. Or 75 minutes (1 hour and 15 minutes) or more of vigorous-intensity aerobic activity each week. Or try for a combination of both. Moderate activity means that you breathe heavier and your heart rate increases but you can still talk. Think about doing 40 minutes of moderate exercise, 3 to 4 times a week. For best results, activity should last for about 40 minutes to lower blood pressure and cholesterol. It's OK to work up to the 40-minute period over time. Examples of moderate-intensity activity are walking 1 mile in 15 minutes. Or doing 30 to 45 minutes of yard work.    Step up your daily activity level.  Along with your exercise program, try being more active the whole day. Walk instead of drive. Or park further away so that you take more steps each day. Do more household tasks or yard work. You may not be able to meet the advised mount of physical activity. But doing some moderate- or vigorous-intensity aerobic activity can help reduce your risk for heart disease. Your healthcare provider can help you figure out what is best for you.    Choose 1 or more activities you enjoy.  Walking is one of the easiest things you can do. You can also try  swimming, riding a bike, dancing, or taking an exercise class.    When to call your healthcare provider  Call your healthcare provider if you have any of these:     Chest pain or feel dizzy or lightheaded    Burning, tightness, pressure, or heaviness in your chest, neck, shoulders, back, or arms    Abnormal shortness of breath    More joint or muscle pain    A very fast or irregular heartbeat (palpitations)  [employer] last reviewed this educational content on 7/1/2019 2000-2021 [employer]. All rights reserved. This information is not intended as a substitute for professional medical care. Always follow your healthcare professional's instructions.        Activities of Daily Living    Your Health Risk Assessment indicates you have difficulties with activities of daily living such as housework, bathing, preparing meals, taking medication, etc. Please make a follow up appointment for us to address this issue in more detail.    Urinary Incontinence (Male)    Urinary incontinence means not being able to control the release of urine from the bladder.   Causes  Common causes of urinary incontinence in men include:    Infection    Certain medicines    Aging    Poor pelvic muscle tone    Bladder spasms    Obesity    Trouble urinating and fully emptying the bladder (urinary retention)  Other things that can cause incontinence are:     Nervous system diseases    Diabetes    Sleep apnea    Urinary tract infections    Prostate surgery    Pelvic injury  Constipation and smoking have also been identified as risk factors.   Symptoms    Urge incontinence (overactive bladder). This is a sudden urge to urinate. It occurs even though there may not be much urine in the bladder. The need to urinate often during the night is common. It's due to bladder spasms.    Stress incontinence. This is urine leakage that you can't control. It can occur with sneezing, coughing, and other actions that put stress on the  bladder.    Treatment  Treatment depends on what is causing the condition. Bladder infections are treated with antibiotics. Urinary retention is treated with a bladder catheter.   Home care  Follow these guidelines when caring for yourself at home:    Don't have any foods and drinks that may irritate the bladder. This includes:  ? Chocolate  ? Alcohol  ? Caffeine  ? Carbonated drinks  ? Acidic fruits and juices    Limit fluids to 6 to 8 cups a day.    Lose weight if you are overweight. This will reduce your symptoms.    If advised, do regular pelvic muscle-strengthening exercises such as Kegel exercises.    If needed, wear absorbent pads to catch urine. Change the pads often. This is for good hygiene and to prevent skin and bladder infections.    Bathe daily for good hygiene.    If an antibiotic was prescribed to treat a bladder infection, take it until it's finished. Keep taking it even if you are feeling better. This is to make sure your infection has cleared.    If a catheter was left in place, keep bacteria from getting into the collection bag. Don't disconnect the catheter from the collection bag.    Use a leg band to secure the catheter drainage tube, so it does not pull on the catheter. Drain the collection bag when it becomes full. To do this, use the drain spout at the bottom of the bag. Don't disconnect the bag from the catheter.    Don't pull on or try to remove a catheter. The catheter must be removed by a healthcare provider.    If you smoke, stop. Ask your provider for help if you can't do this on your own.  Follow-up care  Follow up with your healthcare provider, or as advised.  When to get medical advice  Call your healthcare provider right away if any of these occur:    Fever over 100.4 F (38 C), or as directed by your provider    Bladder pain or fullness    Belly swelling, nausea, or vomiting    Back pain    Weakness, dizziness, or fainting    If a catheter was left in place, return if:  ? The  catheter falls out  ? The catheter stops draining for 6 hours  ? Your urine gets cloudy or smells bad  [employer] last reviewed this educational content on 1/1/2020 2000-2021 [employer]. All rights reserved. This information is not intended as a substitute for professional medical care. Always follow your healthcare professional's instructions.

## 2022-12-01 ENCOUNTER — OFFICE VISIT (OUTPATIENT)
Dept: SLEEP MEDICINE | Facility: CLINIC | Age: 57
End: 2022-12-01
Payer: COMMERCIAL

## 2022-12-01 VITALS
WEIGHT: 260 LBS | SYSTOLIC BLOOD PRESSURE: 113 MMHG | BODY MASS INDEX: 43.32 KG/M2 | DIASTOLIC BLOOD PRESSURE: 76 MMHG | OXYGEN SATURATION: 92 % | HEIGHT: 65 IN | HEART RATE: 90 BPM

## 2022-12-01 DIAGNOSIS — G47.33 OBSTRUCTIVE SLEEP APNEA (ADULT) (PEDIATRIC): Primary | ICD-10-CM

## 2022-12-01 PROCEDURE — 99203 OFFICE O/P NEW LOW 30 MIN: CPT | Performed by: INTERNAL MEDICINE

## 2022-12-01 RX ORDER — DOXEPIN 3 MG/1
TABLET, FILM COATED ORAL
COMMUNITY
Start: 2022-08-03 | End: 2023-06-30

## 2022-12-01 RX ORDER — ASPIRIN 81 MG/1
81 TABLET, COATED ORAL DAILY
COMMUNITY
Start: 2022-10-18

## 2022-12-01 NOTE — NURSING NOTE
Printed annual reminder follow up letter for a 1 year follow up to be mailed Nov. 2023.  Gave patient AVS.

## 2022-12-01 NOTE — PATIENT INSTRUCTIONS
Your BMI is Body mass index is 43.27 kg/m .    What is BMI?  Body mass index (BMI) is one way to tell whether you are at a healthy weight, overweight, or obese. It measures your weight in relation to your height.  A BMI of 18.5 to 24.9 is in the healthy range. A person with a BMI of 25 to 29.9 is considered overweight, and someone with a BMI of 30 or greater is considered obese.  Another way to find out if you are at risk for health problems caused by overweight and obesity is to measure your waist. If you are a woman and your waist is more than 35 inches, or if you are a man and your waist is more than 40 inches, your risk of disease may be higher.  More than two-thirds of American adults are considered overweight or obese. Being overweight or obese increases the risk for further weight gain.  Excess weight may lead to heart disease and diabetes. Creating and following plans for healthy eating and physical activity may help you improve your health.    Methods for maintaining or losing weight.  Weight control is part of healthy lifestyle and includes exercise, emotional health, and healthy eating habits.  Careful eating habits lifelong is the mainstay of weight control.  Though there are significant health benefits from weight loss, long-term weight loss with diet alone may be very difficult to achieve- studies show long-term success with dietary management in less than 10% of people. Attaining a healthy weight may be especially difficult to achieve in those with severe obesity. In some cases, medications, devices and surgical management might be considered.    What can you do?  If you are overweight or obese and are interested in methods for weight loss, you should discuss this with your provider. In addition, we recommend that you review healthy life styles and methods for weight loss available through the National Institutes of Health patient information sites:    http://win.niddk.nih.gov/publications/index.htm    Equipment Instructions    We will process your PAP order and send it to a Durable Medical Equipment (DME) provider.    The medical equipment company should call you within 7 days.  If you have not heard from the company, please contact them to see if they received your order and are planning to call you.    Please call us at [phone] if you are unable to contact the medical equipment company or if they do not have the order.    If you are starting a new PAP machine, please call us after you use it the first night to let us know how it went. This call also helps us know that you received your equipment and that everything is ready. Please use our central phone number [phone]    Contact information for [employer] company:    [facility] Tel: [phone]

## 2022-12-01 NOTE — PROGRESS NOTES
Additional 15 minutes on the date of service was spent performing the following:    -Preparing to see the patient  -Obtaining and/or reviewing separately obtained history   -Ordering medications, tests, or procedures   -Documenting clinical information in the electronic or other health record     Thank you for the opportunity to participate in the care of Dayday Silva.     He is a 57 year old y/o male patient who comes to the sleep medicine clinic for follow up.  The patient was diagnosed with obstructive sleep apnea at our facility on 09/17/2019 (AHI = 16.4).  The patient continues to do well on CPAP therapy.  He is complaining that his CPAP machine is leaking distilled water on the bottom.  He was wondering if he can apply to get a new CPAP machine.  He is still benefiting from CPAP therapy.     Assessment and Plan:  In summary Dayday Silva is a 57 year old year old male who is here for follow up.    1. Obstructive sleep apnea (adult) (pediatric)  I will write a prescription for the patient to apply to get a new CPAP machine.  He will need to physically bring in his current machine to the durable medical equipment company for interrogation.  - COMPREHENSIVE DME     Compliance Download data for 30 days:  Pressure setting: APAP 7-15 CWP  95% pressure: 12 CWP  Residual AHI: 5.6 events per hour  Leak: Minimal  Compliance: 70%  Mask Tolerance: Good  Skin irritation: None  DME:  [facility] [place]    Lab reviewed: Discussed with patient.    Sleep-Wake Cycle:    The patient likes to initiate sleep at around 6-7 PM with a sleep latency of 30 minutes to 1 hour. The patient has 3 nocturnal awakenings. Final wake up time is around 5-6 AM. Occasionally naps during the day.    RICKY:  RICKY Total Score: 7  No data recorded      Patient Active Problem List   Diagnosis     Chronic Major Depression     Obstructive sleep apnea syndrome     Lower Back Pain Chronic     Allergic Rhinitis     Varicose Veins     Schizoaffective  disorder (H)     Myopia, unspecified laterality     Left anterior shoulder pain     Hypophosphatemia     Trouble in sleeping     Urinary incontinence     History of prostate cancer     Mild cognitive disorder     Morbid obesity (H)     Ingrown nail     Colorectal polyps       Past Medical History:   Diagnosis Date     Allergic rhinitis, cause unspecified      Cancer (H)     Prostate     Class 2 obesity due to excess calories with serious comorbidity and body mass index (BMI) of 36.0 to 36.9 in adult     Created by Conversion      Complication of anesthesia      Lumbago      Major depressive disorder, recurrent episode, unspecified      Obesity, unspecified      Schizoaffective disorder (H) 6/24/2015     Sleep apnea        Past Surgical History:   Procedure Laterality Date     HEMORRHOID SURGERY       NC LAP,PROSTATECTOMY,RADICAL,W/NERVE SPARE,INCL ROBOTIC Bilateral 12/7/2016    Procedure: DA ETHAN RADICAL RETROPUBIC PROSTATECTOMY, BILATERAL PELVIC LYMPH NODE DISSECTION ;  Surgeon: Ulisses French MD;  Location: [facility];  Service: Urology     PROSTATE BIOPSY       PROSTATECTOMY  12/07/2016       Current Outpatient Medications   Medication Sig Dispense Refill     acetaminophen (TYLENOL) 500 MG tablet Take 500-1,000 mg by mouth        ARIPiprazole (ABILIFY) 20 MG tablet Take 20 mg by mouth At Bedtime       ARIPiprazole (ABILIFY) 5 MG tablet Take 5 mg by mouth At Bedtime       ASPIRIN LOW DOSE 81 MG EC tablet        diaper,brief,adult,disposable Misc [DIAPER,BRIEF,ADULT,DISPOSABLE MISC] Use 1 each As Directed continuous as needed. 120 each 5     diclofenac (VOLTAREN) 1 % topical gel        disposable gloves Misc [DISPOSABLE GLOVES MISC] Use as needed. 100 each 3     doxepin (SILENOR) 3 MG tablet doxepin 3 mg tablet       doxepin (SILENOR) 3 MG tablet Take 3 mg by mouth At Bedtime       FLUoxetine (PROZAC) 20 MG capsule        FLUoxetine (PROZAC) 40 MG capsule Take 60 mg by mouth daily        fluticasone  "(FLONASE) 50 MCG/ACT nasal spray Spray 2 sprays into both nostrils daily       gabapentin (NEURONTIN) 300 MG capsule Take 300 mg by mouth 3 times daily       hydrOXYzine (ATARAX) 50 MG tablet Take 50 mg by mouth At Bedtime       ketotifen (ZADITOR/ZYRTEC ITCHY EYES) 0.025 % (0.035 %) ophthalmic solution [KETOTIFEN (ZADITOR/ZYRTEC ITCHY EYES) 0.025 % (0.035 %) OPHTHALMIC SOLUTION] INSTILL ONE DROP IN BOTH EYES UP TO TWICE DAILY AS NEEDED **12 TOTAL FILLS* 5 mL 10     loratadine (CLARITIN) 10 mg tablet [LORATADINE (CLARITIN) 10 MG TABLET] Take 1 tablet (10 mg total) by mouth daily. 100 tablet 3     LORazepam (ATIVAN) 1 MG tablet Take 1 mg by mouth as needed       mirabegron (MYRBETRIQ) 50 mg Tb24 ER tablet [MIRABEGRON (MYRBETRIQ) 50 MG TB24 ER TABLET] Take 50 mg by mouth daily.       multivitamin with folic acid (ONE DAILY MULTIVITAMIN) 400 mcg Tab [MULTIVITAMIN WITH FOLIC ACID (ONE DAILY MULTIVITAMIN) 400 MCG TAB] Take 1 tablet by mouth daily. 90 tablet 3     polyethylene glycol (MIRALAX) 17 gram packet [POLYETHYLENE GLYCOL (MIRALAX) 17 GRAM PACKET] DISSOLVE 1 PACKET INTO LIQUID AND DRINK BY MOUTH EVERY OTHER DAY;INCREASE TO ONCE DAILY DOSE IF CONSTIPATED. 14 each 11     TOVIAZ 4 mg Tb24 ER tablet [TOVIAZ 4 MG TB24 ER TABLET]        trolamine salicylate-aloe vera (ASPERCREME WITH ALOE) 10 % Crea [TROLAMINE SALICYLATE-ALOE VERA (ASPERCREME WITH ALOE) 10 % CREA] Apply to left shoulder area 3x daily as needed. 1 Tube 2       No Known Allergies      Physical Exam:  /76   Pulse 90   Ht 1.651 m (5' 5\")   Wt 117.9 kg (260 lb)   SpO2 92%   BMI 43.27 kg/m    BMI:Body mass index is 43.27 kg/m .   GEN: NAD,   Head: Normocephalic.  Psych: normal mood, normal affect    Labs/Studies:      No results found for: PH, PHARTERIAL, PO2, HO8HRECIKTE, SAT, PCO2, HCO3, BASEEXCESS, TANA, BEB  No results found for: TSH  Lab Results   Component Value Date    GLC 83 02/15/2022     (H) 09/21/2020     Lab Results   Component " Value Date    HGB 15.1 09/21/2020     Lab Results   Component Value Date    BUN 12 02/15/2022    BUN 12 09/21/2020    CR 0.94 02/15/2022    CR 0.98 09/21/2020     Lab Results   Component Value Date    AST 24 02/15/2022    ALT 31 02/15/2022    ALKPHOS 89 02/15/2022    BILITOTAL 0.5 02/15/2022     No results found for: UAMP, UBARB, BENZODIAZEUR, UCANN, UCOC, OPIT, UPCP    Recent Labs   Lab Test 02/15/22  0950 09/21/20  1429    139   POTASSIUM 4.6 4.3   CHLORIDE 105 105   CO2 24 26   ANIONGAP 9 8   GLC 83 153*   BUN 12 12   CR 0.94 0.98   REX 9.3 9.2       No results found for: FARHAT    I reviewed the efficacy and compliance report from his device. Data summarized on the HPI and the PAP compliance flow sheet.     Patient verbalized understanding of these issues, agrees with the plan and all questions were answered today. Patient was given an opportuntity to voice any other symptoms or concerns not listed above. Patient did not have any other symptoms or concerns.      Jose Haywood DO  Board Certified in Internal Medicine and Sleep Medicine    (Note created with Dragon voice recognition and unintended spelling errors and word substitutions may occur)     Audio and visual devices were used for this virtual clinic visit with permission from patient.

## 2023-01-02 ENCOUNTER — TELEPHONE (OUTPATIENT)
Dept: SLEEP MEDICINE | Facility: CLINIC | Age: 58
End: 2023-01-02

## 2023-01-02 NOTE — TELEPHONE ENCOUNTER
Phone call made to patient and explained upcoming visit is not needed. Per provider last note patient to bring in his current machine to his durable medical equipment company. Offered to give patient [facility] contact number but patient states he is unable to take down to many phone numbers. Patient also states he has someone who schedule his appointment for him but that person is not available. My phone number given to patient to give to the person who schedules for him to call me back.   Patient had no further questions.     JCARLOS Joy  [facility] [place]

## 2023-01-09 NOTE — TELEPHONE ENCOUNTER
Voicemail left to call back upcoming appointment is not needed. Patient to call back to further discuss.       JCARLOS Joy  [facility]

## 2023-01-17 NOTE — TELEPHONE ENCOUNTER
Third attempted voicemail left that tomorrow appointment is going get cancel. Provider noted for patient to reach out [facility] and contact number left for patient.     JCARLOS Joy  [facility]

## 2023-04-23 ENCOUNTER — HEALTH MAINTENANCE LETTER (OUTPATIENT)
Age: 58
End: 2023-04-23

## 2023-05-01 ENCOUNTER — TELEPHONE (OUTPATIENT)
Dept: FAMILY MEDICINE | Facility: CLINIC | Age: 58
End: 2023-05-01
Payer: COMMERCIAL

## 2023-05-01 ENCOUNTER — DOCUMENTATION ONLY (OUTPATIENT)
Dept: SLEEP MEDICINE | Facility: CLINIC | Age: 58
End: 2023-05-01
Payer: COMMERCIAL

## 2023-05-01 DIAGNOSIS — G47.33 OBSTRUCTIVE SLEEP APNEA (ADULT) (PEDIATRIC): Primary | ICD-10-CM

## 2023-05-01 NOTE — TELEPHONE ENCOUNTER
Patient advised to [facility] for supply refill. Advised he inquire about automatic refills. Phone number provided.     Luana BLOUNT RN  [facility]

## 2023-05-01 NOTE — TELEPHONE ENCOUNTER
Medication Question or Refill    Contacts       Type Contact Phone/Fax    05/01/2023 12:41 PM CDT Phone (Incoming) Kristine (Other)      [facility] [place]          What medication are you calling about (include dose and sig)?: CPAP Supplies for Mask with the tube that connects to the machine.     Preferred Pharmacy:   [facility] - [place], [place] - [address] [address]  [id number] [place], [place] [address]e. [facility] [address]  Phone: [phone] Fax: [phone]    [id number] [place], [place] - [facility] AT [facility] [address]  [address] [facility] [address]  Phone: [phone] Fax: [phone]      Controlled Substance Agreement on file:   CSA -- Patient Level:    CSA: None found at the patient level.       Who prescribed the medication?: Yobany    Do you need a refill? Yes    When did you use the medication last? Daily    Patient offered an appointment? No    Do you have any questions or concerns?  Yes: PT needs the renewal today. Pt already contacted  home medical and said to contact DR Haywood.       Could we send this information to you in [facility] or would you prefer to receive a phone call?:   Patient would prefer a phone call   Okay to leave a detailed message?: Yes at Other phone number:  [phone]

## 2023-05-25 ENCOUNTER — TRANSFERRED RECORDS (OUTPATIENT)
Dept: LAB | Facility: HOSPITAL | Age: 58
End: 2023-05-25

## 2023-06-01 ENCOUNTER — LAB REQUISITION (OUTPATIENT)
Dept: LAB | Facility: HOSPITAL | Age: 58
End: 2023-06-01
Payer: COMMERCIAL

## 2023-06-01 DIAGNOSIS — C18.9 MALIGNANT NEOPLASM OF COLON, UNSPECIFIED (H): ICD-10-CM

## 2023-06-01 LAB — CEA SERPL-MCNC: 2.4 NG/ML

## 2023-06-01 PROCEDURE — 82378 CARCINOEMBRYONIC ANTIGEN: CPT | Mod: ORL | Performed by: COLON & RECTAL SURGERY

## 2023-06-01 PROCEDURE — 36415 COLL VENOUS BLD VENIPUNCTURE: CPT | Mod: ORL | Performed by: COLON & RECTAL SURGERY

## 2023-06-23 ENCOUNTER — TRANSFERRED RECORDS (OUTPATIENT)
Dept: HEALTH INFORMATION MANAGEMENT | Facility: CLINIC | Age: 58
End: 2023-06-23
Payer: COMMERCIAL

## 2023-06-23 LAB
ALT SERPL-CCNC: 34 U/L (ref 10–49)
AST SERPL-CCNC: 31 U/L
CREATININE (EXTERNAL): 1.04 MG/DL (ref 0.7–1.3)
GFR ESTIMATED (EXTERNAL): 82.71 ML/MIN/1.73M2
GLUCOSE (EXTERNAL): 91 MG/DL (ref 74–106)
POTASSIUM (EXTERNAL): 4.4 MMOL/L (ref 3.4–5.1)

## 2023-06-26 ENCOUNTER — MEDICAL CORRESPONDENCE (OUTPATIENT)
Dept: HEALTH INFORMATION MANAGEMENT | Facility: CLINIC | Age: 58
End: 2023-06-26
Payer: COMMERCIAL

## 2023-06-29 RX ORDER — FAMOTIDINE 20 MG/1
20 TABLET, FILM COATED ORAL 2 TIMES DAILY PRN
COMMUNITY

## 2023-06-29 NOTE — H&P
Colorectal Surgery Staff:  I have seen and examined the patient. I agree with the above documentation and plan of the fellow/PA above with the following additions/chages:    No changes.    Dx: Left colon cancer    Plan: Robotic left colectomy    MD YONATHAN UngerA  [facility]  Office: [phone]  7/3/2023 7:23 AM

## 2023-07-01 ENCOUNTER — ANESTHESIA EVENT (OUTPATIENT)
Dept: SURGERY | Facility: HOSPITAL | Age: 58
DRG: 330 | End: 2023-07-01
Payer: COMMERCIAL

## 2023-07-02 NOTE — ANESTHESIA PREPROCEDURE EVALUATION
Anesthesia Pre-Procedure Evaluation    Patient: Dayday Silva   MRN: [id number] : 1965        Procedure : Procedure(s):  ROBOTIC LEFT COLECTOMY          Past Medical History:   Diagnosis Date     Allergic rhinitis, cause unspecified      Cancer (H)     Prostate     Class 2 obesity due to excess calories with serious comorbidity and body mass index (BMI) of 36.0 to 36.9 in adult     Created by Conversion      Colon cancer (H)      Complication of anesthesia      Lumbago      Major depressive disorder, recurrent episode, unspecified      Obesity, unspecified      Other chronic pain      Schizoaffective disorder (H) 2015     Sleep apnea      Urinary incontinence       Past Surgical History:   Procedure Laterality Date     HEMORRHOID SURGERY       ORTHOPEDIC SURGERY       FL LAP,PROSTATECTOMY,RADICAL,W/NERVE SPARE,INCL ROBOTIC Bilateral 2016    Procedure: DA ETHAN RADICAL RETROPUBIC PROSTATECTOMY, BILATERAL PELVIC LYMPH NODE DISSECTION ;  Surgeon: Ulisses French MD;  Location: [facility];  Service: Urology     PROSTATE BIOPSY       PROSTATECTOMY  2016      No Known Allergies   Social History     Tobacco Use     Smoking status: Former     Packs/day: 1.00     Years: 32.00     Pack years: 32.00     Types: Cigarettes     Start date: 1984     Quit date: 2016     Years since quittin.5     Smokeless tobacco: Former   Substance Use Topics     Alcohol use: Yes     Comment: occasional      Wt Readings from Last 1 Encounters:   22 117.9 kg (260 lb)        Anesthesia Evaluation            ROS/MED HX  ENT/Pulmonary:     (+) sleep apnea, moderate, uses CPAP,     Neurologic:  - neg neurologic ROS     Cardiovascular:  - neg cardiovascular ROS     METS/Exercise Tolerance: >4 METS    Hematologic:  - neg hematologic  ROS     Musculoskeletal:       GI/Hepatic:  - neg GI/hepatic ROS     Renal/Genitourinary:  - neg Renal ROS     Endo:     (+) Obesity,     Psychiatric/Substance Use:  Comment: Schizoaffective disorder    (+) psychiatric history depression     Infectious Disease:  - neg infectious disease ROS     Malignancy:  - neg malignancy ROS     Other:  - neg other ROS    (+) , H/O Chronic Pain,        Physical Exam    Airway  airway exam normal      Mallampati: II       Respiratory Devices and Support         Dental     Comment: Poor condition    (+) Removable bridges or other hardware      Cardiovascular   cardiovascular exam normal       Rhythm and rate: regular and normal     Pulmonary   pulmonary exam normal        breath sounds clear to auscultation           OUTSIDE LABS:  CBC:   Lab Results   Component Value Date    WBC 5.8 09/21/2020    HGB 15.1 09/21/2020    HCT 44.8 09/21/2020     09/21/2020     BMP:   Lab Results   Component Value Date     02/15/2022     09/21/2020    POTASSIUM 4.6 02/15/2022    POTASSIUM 4.3 09/21/2020    CHLORIDE 105 02/15/2022    CHLORIDE 105 09/21/2020    CO2 24 02/15/2022    CO2 26 09/21/2020    BUN 12 02/15/2022    BUN 12 09/21/2020    CR 0.94 02/15/2022    CR 0.98 09/21/2020    GLC 83 02/15/2022     (H) 09/21/2020     COAGS: No results found for: PTT, INR, FIBR  POC: No results found for: BGM, HCG, HCGS  HEPATIC:   Lab Results   Component Value Date    ALBUMIN 3.7 02/15/2022    PROTTOTAL 7.0 02/15/2022    ALT 31 02/15/2022    AST 24 02/15/2022    ALKPHOS 89 02/15/2022    BILITOTAL 0.5 02/15/2022     OTHER:   Lab Results   Component Value Date    A1C 5.5 02/15/2022    REX 9.3 02/15/2022       Anesthesia Plan    ASA Status:  3      Anesthesia Type: General.     - Airway: ETT   Induction: Intravenous.   Maintenance: Balanced.        Consents    Anesthesia Plan(s) and associated risks, benefits, and realistic alternatives discussed. Questions answered and patient/representative(s) expressed understanding.    - Discussed:     - Discussed with:  Patient, Spouse      - Extended Intubation/Ventilatory Support Discussed: No.      - Patient  is DNR/DNI Status: No    Use of blood products discussed: No .     Postoperative Care    Pain management: IV analgesics, Oral pain medications, Multi-modal analgesia, Peripheral nerve block (Single Shot).   PONV prophylaxis: Ondansetron (or other 5HT-3), Dexamethasone or Solumedrol     Comments:    Other Comments: GAETT  Ketamine after induction  TAP block for POP  Magnesium violet op infusion  Antiemetics  Tylenol po pre op  Toradol at the end of case if okay with surgeon  Consider background propofol  Soft bite block            Neptali Reyna MD

## 2023-07-03 ENCOUNTER — APPOINTMENT (OUTPATIENT)
Dept: RADIOLOGY | Facility: HOSPITAL | Age: 58
DRG: 330 | End: 2023-07-03
Attending: ANESTHESIOLOGY
Payer: COMMERCIAL

## 2023-07-03 ENCOUNTER — ANESTHESIA (OUTPATIENT)
Dept: SURGERY | Facility: HOSPITAL | Age: 58
DRG: 330 | End: 2023-07-03
Payer: COMMERCIAL

## 2023-07-03 ENCOUNTER — HOSPITAL ENCOUNTER (INPATIENT)
Facility: HOSPITAL | Age: 58
LOS: 18 days | Discharge: HOME HEALTH | DRG: 330 | End: 2023-07-21
Attending: COLON & RECTAL SURGERY | Admitting: COLON & RECTAL SURGERY
Payer: COMMERCIAL

## 2023-07-03 PROBLEM — C18.9 COLON CANCER (H): Status: ACTIVE | Noted: 2023-07-03

## 2023-07-03 LAB
ABO/RH(D): NORMAL
ANTIBODY SCREEN: NEGATIVE
CREAT SERPL-MCNC: 1.05 MG/DL (ref 0.67–1.17)
GFR SERPL CREATININE-BSD FRML MDRD: 82 ML/MIN/1.73M2
GLUCOSE BLDC GLUCOMTR-MCNC: 124 MG/DL (ref 70–99)
HGB BLD-MCNC: 13.7 G/DL (ref 13.3–17.7)
MAGNESIUM SERPL-MCNC: 2.6 MG/DL (ref 1.7–2.3)
POTASSIUM SERPL-SCNC: 4.9 MMOL/L (ref 3.4–5.3)
SPECIMEN EXPIRATION DATE: NORMAL

## 2023-07-03 PROCEDURE — 86850 RBC ANTIBODY SCREEN: CPT

## 2023-07-03 PROCEDURE — 120N000001 HC R&B MED SURG/OB

## 2023-07-03 PROCEDURE — 250N000011 HC RX IP 250 OP 636: Mod: JZ | Performed by: ANESTHESIOLOGY

## 2023-07-03 PROCEDURE — 0DJD8ZZ INSPECTION OF LOWER INTESTINAL TRACT, VIA NATURAL OR ARTIFICIAL OPENING ENDOSCOPIC: ICD-10-PCS | Performed by: COLON & RECTAL SURGERY

## 2023-07-03 PROCEDURE — 250N000011 HC RX IP 250 OP 636

## 2023-07-03 PROCEDURE — 84132 ASSAY OF SERUM POTASSIUM: CPT | Performed by: COLON & RECTAL SURGERY

## 2023-07-03 PROCEDURE — 370N000017 HC ANESTHESIA TECHNICAL FEE, PER MIN: Performed by: COLON & RECTAL SURGERY

## 2023-07-03 PROCEDURE — 250N000011 HC RX IP 250 OP 636: Mod: JZ | Performed by: NURSE ANESTHETIST, CERTIFIED REGISTERED

## 2023-07-03 PROCEDURE — 258N000003 HC RX IP 258 OP 636: Performed by: ANESTHESIOLOGY

## 2023-07-03 PROCEDURE — 86901 BLOOD TYPING SEROLOGIC RH(D): CPT

## 2023-07-03 PROCEDURE — 360N000080 HC SURGERY LEVEL 7, PER MIN: Performed by: COLON & RECTAL SURGERY

## 2023-07-03 PROCEDURE — 999N000157 HC STATISTIC RCP TIME EA 10 MIN

## 2023-07-03 PROCEDURE — 250N000025 HC SEVOFLURANE, PER MIN: Performed by: COLON & RECTAL SURGERY

## 2023-07-03 PROCEDURE — 250N000009 HC RX 250: Performed by: COLON & RECTAL SURGERY

## 2023-07-03 PROCEDURE — 710N000010 HC RECOVERY PHASE 1, LEVEL 2, PER MIN: Performed by: COLON & RECTAL SURGERY

## 2023-07-03 PROCEDURE — 258N000003 HC RX IP 258 OP 636: Performed by: COLON & RECTAL SURGERY

## 2023-07-03 PROCEDURE — 250N000013 HC RX MED GY IP 250 OP 250 PS 637

## 2023-07-03 PROCEDURE — 0DTG4ZZ RESECTION OF LEFT LARGE INTESTINE, PERCUTANEOUS ENDOSCOPIC APPROACH: ICD-10-PCS | Performed by: COLON & RECTAL SURGERY

## 2023-07-03 PROCEDURE — 250N000009 HC RX 250: Performed by: NURSE ANESTHETIST, CERTIFIED REGISTERED

## 2023-07-03 PROCEDURE — 999N000141 HC STATISTIC PRE-PROCEDURE NURSING ASSESSMENT: Performed by: COLON & RECTAL SURGERY

## 2023-07-03 PROCEDURE — C9290 INJ, BUPIVACAINE LIPOSOME: HCPCS | Performed by: ANESTHESIOLOGY

## 2023-07-03 PROCEDURE — 85018 HEMOGLOBIN: CPT

## 2023-07-03 PROCEDURE — 272N000001 HC OR GENERAL SUPPLY STERILE: Performed by: COLON & RECTAL SURGERY

## 2023-07-03 PROCEDURE — 272N000004 HC RX 272: Performed by: COLON & RECTAL SURGERY

## 2023-07-03 PROCEDURE — 250N000011 HC RX IP 250 OP 636: Mod: JZ

## 2023-07-03 PROCEDURE — 258N000003 HC RX IP 258 OP 636: Performed by: NURSE ANESTHETIST, CERTIFIED REGISTERED

## 2023-07-03 PROCEDURE — 82565 ASSAY OF CREATININE: CPT

## 2023-07-03 PROCEDURE — 258N000003 HC RX IP 258 OP 636

## 2023-07-03 PROCEDURE — 88309 TISSUE EXAM BY PATHOLOGIST: CPT | Mod: 26 | Performed by: PATHOLOGY

## 2023-07-03 PROCEDURE — 94660 CPAP INITIATION&MGMT: CPT

## 2023-07-03 PROCEDURE — 88309 TISSUE EXAM BY PATHOLOGIST: CPT | Mod: TC | Performed by: COLON & RECTAL SURGERY

## 2023-07-03 PROCEDURE — 71045 X-RAY EXAM CHEST 1 VIEW: CPT

## 2023-07-03 PROCEDURE — 8E0W4CZ ROBOTIC ASSISTED PROCEDURE OF TRUNK REGION, PERCUTANEOUS ENDOSCOPIC APPROACH: ICD-10-PCS | Performed by: COLON & RECTAL SURGERY

## 2023-07-03 PROCEDURE — 36415 COLL VENOUS BLD VENIPUNCTURE: CPT

## 2023-07-03 PROCEDURE — 83735 ASSAY OF MAGNESIUM: CPT | Performed by: COLON & RECTAL SURGERY

## 2023-07-03 RX ORDER — PROPOFOL 10 MG/ML
INJECTION, EMULSION INTRAVENOUS CONTINUOUS PRN
Status: DISCONTINUED | OUTPATIENT
Start: 2023-07-03 | End: 2023-07-03

## 2023-07-03 RX ORDER — FENTANYL CITRATE 50 UG/ML
25-100 INJECTION, SOLUTION INTRAMUSCULAR; INTRAVENOUS
Status: DISCONTINUED | OUTPATIENT
Start: 2023-07-03 | End: 2023-07-03 | Stop reason: HOSPADM

## 2023-07-03 RX ORDER — ONDANSETRON 2 MG/ML
4 INJECTION INTRAMUSCULAR; INTRAVENOUS EVERY 30 MIN PRN
Status: DISCONTINUED | OUTPATIENT
Start: 2023-07-03 | End: 2023-07-03 | Stop reason: HOSPADM

## 2023-07-03 RX ORDER — ACETAMINOPHEN 325 MG/1
650 TABLET ORAL EVERY 4 HOURS PRN
Status: DISCONTINUED | OUTPATIENT
Start: 2023-07-06 | End: 2023-07-21 | Stop reason: HOSPADM

## 2023-07-03 RX ORDER — FENTANYL CITRATE 50 UG/ML
50 INJECTION, SOLUTION INTRAMUSCULAR; INTRAVENOUS EVERY 5 MIN PRN
Status: DISCONTINUED | OUTPATIENT
Start: 2023-07-03 | End: 2023-07-03 | Stop reason: HOSPADM

## 2023-07-03 RX ORDER — HYDROMORPHONE HCL IN WATER/PF 6 MG/30 ML
0.2 PATIENT CONTROLLED ANALGESIA SYRINGE INTRAVENOUS
Status: DISCONTINUED | OUTPATIENT
Start: 2023-07-03 | End: 2023-07-21 | Stop reason: HOSPADM

## 2023-07-03 RX ORDER — ENOXAPARIN SODIUM 100 MG/ML
40 INJECTION SUBCUTANEOUS EVERY 24 HOURS
Status: DISCONTINUED | OUTPATIENT
Start: 2023-07-04 | End: 2023-07-03 | Stop reason: DRUGHIGH

## 2023-07-03 RX ORDER — HYDROMORPHONE HCL IN WATER/PF 6 MG/30 ML
0.4 PATIENT CONTROLLED ANALGESIA SYRINGE INTRAVENOUS
Status: DISCONTINUED | OUTPATIENT
Start: 2023-07-03 | End: 2023-07-21 | Stop reason: HOSPADM

## 2023-07-03 RX ORDER — HYDROXYZINE HYDROCHLORIDE 50 MG/1
50 TABLET, FILM COATED ORAL AT BEDTIME
Status: DISCONTINUED | OUTPATIENT
Start: 2023-07-03 | End: 2023-07-21 | Stop reason: HOSPADM

## 2023-07-03 RX ORDER — MAGNESIUM HYDROXIDE 1200 MG/15ML
LIQUID ORAL PRN
Status: DISCONTINUED | OUTPATIENT
Start: 2023-07-03 | End: 2023-07-03 | Stop reason: HOSPADM

## 2023-07-03 RX ORDER — FENTANYL CITRATE 50 UG/ML
25 INJECTION, SOLUTION INTRAMUSCULAR; INTRAVENOUS EVERY 5 MIN PRN
Status: DISCONTINUED | OUTPATIENT
Start: 2023-07-03 | End: 2023-07-03 | Stop reason: HOSPADM

## 2023-07-03 RX ORDER — SODIUM CHLORIDE, SODIUM LACTATE, POTASSIUM CHLORIDE, CALCIUM CHLORIDE 600; 310; 30; 20 MG/100ML; MG/100ML; MG/100ML; MG/100ML
INJECTION, SOLUTION INTRAVENOUS CONTINUOUS
Status: DISCONTINUED | OUTPATIENT
Start: 2023-07-03 | End: 2023-07-03 | Stop reason: HOSPADM

## 2023-07-03 RX ORDER — MAGNESIUM SULFATE 4 G/50ML
4 INJECTION INTRAVENOUS ONCE
Status: COMPLETED | OUTPATIENT
Start: 2023-07-03 | End: 2023-07-03

## 2023-07-03 RX ORDER — CALCIUM CARBONATE 500 MG/1
500 TABLET, CHEWABLE ORAL DAILY PRN
Status: DISCONTINUED | OUTPATIENT
Start: 2023-07-03 | End: 2023-07-21 | Stop reason: HOSPADM

## 2023-07-03 RX ORDER — DOXAZOSIN 1 MG/1
2 TABLET ORAL EVERY EVENING
Status: DISCONTINUED | OUTPATIENT
Start: 2023-07-03 | End: 2023-07-21 | Stop reason: HOSPADM

## 2023-07-03 RX ORDER — NALOXONE HYDROCHLORIDE 1 MG/ML
0.2 INJECTION INTRAMUSCULAR; INTRAVENOUS; SUBCUTANEOUS
Status: DISCONTINUED | OUTPATIENT
Start: 2023-07-03 | End: 2023-07-21 | Stop reason: HOSPADM

## 2023-07-03 RX ORDER — FLUTICASONE PROPIONATE 50 MCG
2 SPRAY, SUSPENSION (ML) NASAL DAILY
Status: DISCONTINUED | OUTPATIENT
Start: 2023-07-03 | End: 2023-07-21 | Stop reason: HOSPADM

## 2023-07-03 RX ORDER — SODIUM CHLORIDE, SODIUM LACTATE, POTASSIUM CHLORIDE, CALCIUM CHLORIDE 600; 310; 30; 20 MG/100ML; MG/100ML; MG/100ML; MG/100ML
INJECTION, SOLUTION INTRAVENOUS CONTINUOUS
Status: DISCONTINUED | OUTPATIENT
Start: 2023-07-03 | End: 2023-07-04

## 2023-07-03 RX ORDER — OXYCODONE HYDROCHLORIDE 5 MG/1
5 TABLET ORAL
Status: DISCONTINUED | OUTPATIENT
Start: 2023-07-03 | End: 2023-07-03 | Stop reason: HOSPADM

## 2023-07-03 RX ORDER — LIDOCAINE 40 MG/G
CREAM TOPICAL
Status: DISCONTINUED | OUTPATIENT
Start: 2023-07-03 | End: 2023-07-03 | Stop reason: HOSPADM

## 2023-07-03 RX ORDER — ACETAMINOPHEN 325 MG/1
975 TABLET ORAL ONCE
Status: COMPLETED | OUTPATIENT
Start: 2023-07-03 | End: 2023-07-03

## 2023-07-03 RX ORDER — PROPOFOL 10 MG/ML
INJECTION, EMULSION INTRAVENOUS PRN
Status: DISCONTINUED | OUTPATIENT
Start: 2023-07-03 | End: 2023-07-03

## 2023-07-03 RX ORDER — CALCIUM CARBONATE 500(1250)
1 TABLET,CHEWABLE ORAL DAILY PRN
COMMUNITY
Start: 2023-01-27

## 2023-07-03 RX ORDER — EPHEDRINE SULFATE 50 MG/ML
INJECTION, SOLUTION INTRAMUSCULAR; INTRAVENOUS; SUBCUTANEOUS PRN
Status: DISCONTINUED | OUTPATIENT
Start: 2023-07-03 | End: 2023-07-03

## 2023-07-03 RX ORDER — ONDANSETRON 2 MG/ML
4 INJECTION INTRAMUSCULAR; INTRAVENOUS EVERY 6 HOURS PRN
Status: DISCONTINUED | OUTPATIENT
Start: 2023-07-03 | End: 2023-07-21 | Stop reason: HOSPADM

## 2023-07-03 RX ORDER — FENTANYL CITRATE 50 UG/ML
INJECTION, SOLUTION INTRAMUSCULAR; INTRAVENOUS PRN
Status: DISCONTINUED | OUTPATIENT
Start: 2023-07-03 | End: 2023-07-03

## 2023-07-03 RX ORDER — LABETALOL HYDROCHLORIDE 5 MG/ML
INJECTION, SOLUTION INTRAVENOUS PRN
Status: DISCONTINUED | OUTPATIENT
Start: 2023-07-03 | End: 2023-07-03

## 2023-07-03 RX ORDER — BUPIVACAINE HYDROCHLORIDE 2.5 MG/ML
INJECTION, SOLUTION EPIDURAL; INFILTRATION; INTRACAUDAL
Status: DISCONTINUED | OUTPATIENT
Start: 2023-07-03 | End: 2023-07-03

## 2023-07-03 RX ORDER — ARIPIPRAZOLE 15 MG/1
30 TABLET ORAL AT BEDTIME
Status: DISCONTINUED | OUTPATIENT
Start: 2023-07-03 | End: 2023-07-21 | Stop reason: HOSPADM

## 2023-07-03 RX ORDER — ACETAMINOPHEN 325 MG/1
975 TABLET ORAL EVERY 8 HOURS
Status: COMPLETED | OUTPATIENT
Start: 2023-07-03 | End: 2023-07-06

## 2023-07-03 RX ORDER — ONDANSETRON 4 MG/1
4 TABLET, ORALLY DISINTEGRATING ORAL EVERY 6 HOURS PRN
Status: DISCONTINUED | OUTPATIENT
Start: 2023-07-03 | End: 2023-07-21 | Stop reason: HOSPADM

## 2023-07-03 RX ORDER — CALCIUM CARBONATE 500(1250)
1 TABLET,CHEWABLE ORAL DAILY PRN
Status: DISCONTINUED | OUTPATIENT
Start: 2023-07-03 | End: 2023-07-03

## 2023-07-03 RX ORDER — FAMOTIDINE 20 MG/1
20 TABLET, FILM COATED ORAL 2 TIMES DAILY PRN
Status: DISCONTINUED | OUTPATIENT
Start: 2023-07-03 | End: 2023-07-21 | Stop reason: HOSPADM

## 2023-07-03 RX ORDER — LORAZEPAM 0.5 MG/1
0.5 TABLET ORAL 2 TIMES DAILY
COMMUNITY
Start: 2023-06-14

## 2023-07-03 RX ORDER — CEFAZOLIN SODIUM/WATER 3 G/30 ML
3 SYRINGE (ML) INTRAVENOUS SEE ADMIN INSTRUCTIONS
Status: DISCONTINUED | OUTPATIENT
Start: 2023-07-03 | End: 2023-07-03 | Stop reason: HOSPADM

## 2023-07-03 RX ORDER — INDOCYANINE GREEN AND WATER 25 MG
KIT INJECTION PRN
Status: DISCONTINUED | OUTPATIENT
Start: 2023-07-03 | End: 2023-07-03

## 2023-07-03 RX ORDER — GABAPENTIN 100 MG/1
100 CAPSULE ORAL 3 TIMES DAILY
Status: DISCONTINUED | OUTPATIENT
Start: 2023-07-03 | End: 2023-07-21 | Stop reason: HOSPADM

## 2023-07-03 RX ORDER — ONDANSETRON 2 MG/ML
4 INJECTION INTRAMUSCULAR; INTRAVENOUS ONCE
Status: COMPLETED | OUTPATIENT
Start: 2023-07-03 | End: 2023-07-03

## 2023-07-03 RX ORDER — MELOXICAM 7.5 MG/1
7.5 TABLET ORAL 2 TIMES DAILY WITH MEALS
Status: DISCONTINUED | OUTPATIENT
Start: 2023-07-03 | End: 2023-07-21 | Stop reason: HOSPADM

## 2023-07-03 RX ORDER — DOXAZOSIN 2 MG/1
2 TABLET ORAL EVERY EVENING
COMMUNITY
Start: 2023-06-07

## 2023-07-03 RX ORDER — AMOXICILLIN 250 MG
1 CAPSULE ORAL 2 TIMES DAILY PRN
COMMUNITY
Start: 2023-01-27

## 2023-07-03 RX ORDER — SODIUM CHLORIDE, SODIUM LACTATE, POTASSIUM CHLORIDE, AND CALCIUM CHLORIDE .6; .31; .03; .02 G/100ML; G/100ML; G/100ML; G/100ML
IRRIGANT IRRIGATION PRN
Status: DISCONTINUED | OUTPATIENT
Start: 2023-07-03 | End: 2023-07-03 | Stop reason: HOSPADM

## 2023-07-03 RX ORDER — HEPARIN SODIUM 5000 [USP'U]/.5ML
5000 INJECTION, SOLUTION INTRAVENOUS; SUBCUTANEOUS
Status: COMPLETED | OUTPATIENT
Start: 2023-07-03 | End: 2023-07-03

## 2023-07-03 RX ORDER — MELOXICAM 7.5 MG/1
7.5 TABLET ORAL 2 TIMES DAILY WITH MEALS
COMMUNITY
Start: 2023-06-29

## 2023-07-03 RX ORDER — LORAZEPAM 0.5 MG/1
0.5 TABLET ORAL 2 TIMES DAILY
Status: DISCONTINUED | OUTPATIENT
Start: 2023-07-03 | End: 2023-07-21 | Stop reason: HOSPADM

## 2023-07-03 RX ORDER — CEFAZOLIN SODIUM/WATER 3 G/30 ML
3 SYRINGE (ML) INTRAVENOUS
Status: COMPLETED | OUTPATIENT
Start: 2023-07-03 | End: 2023-07-03

## 2023-07-03 RX ORDER — ENOXAPARIN SODIUM 100 MG/ML
40 INJECTION SUBCUTANEOUS EVERY 12 HOURS
Status: DISCONTINUED | OUTPATIENT
Start: 2023-07-04 | End: 2023-07-06

## 2023-07-03 RX ORDER — OXYCODONE HYDROCHLORIDE 5 MG/1
10 TABLET ORAL
Status: DISCONTINUED | OUTPATIENT
Start: 2023-07-03 | End: 2023-07-03 | Stop reason: HOSPADM

## 2023-07-03 RX ORDER — NALOXONE HYDROCHLORIDE 1 MG/ML
0.4 INJECTION INTRAMUSCULAR; INTRAVENOUS; SUBCUTANEOUS
Status: DISCONTINUED | OUTPATIENT
Start: 2023-07-03 | End: 2023-07-21 | Stop reason: HOSPADM

## 2023-07-03 RX ORDER — DEXAMETHASONE SODIUM PHOSPHATE 10 MG/ML
INJECTION, SOLUTION INTRAMUSCULAR; INTRAVENOUS PRN
Status: DISCONTINUED | OUTPATIENT
Start: 2023-07-03 | End: 2023-07-03

## 2023-07-03 RX ORDER — METRONIDAZOLE 500 MG/100ML
500 INJECTION, SOLUTION INTRAVENOUS
Status: COMPLETED | OUTPATIENT
Start: 2023-07-03 | End: 2023-07-03

## 2023-07-03 RX ORDER — ONDANSETRON 2 MG/ML
INJECTION INTRAMUSCULAR; INTRAVENOUS PRN
Status: DISCONTINUED | OUTPATIENT
Start: 2023-07-03 | End: 2023-07-03

## 2023-07-03 RX ORDER — LIDOCAINE 40 MG/G
CREAM TOPICAL
Status: DISCONTINUED | OUTPATIENT
Start: 2023-07-03 | End: 2023-07-21 | Stop reason: HOSPADM

## 2023-07-03 RX ORDER — DOXEPIN 3 MG/1
3 TABLET, FILM COATED ORAL AT BEDTIME
Status: DISCONTINUED | OUTPATIENT
Start: 2023-07-03 | End: 2023-07-21 | Stop reason: HOSPADM

## 2023-07-03 RX ORDER — ONDANSETRON 4 MG/1
4 TABLET, ORALLY DISINTEGRATING ORAL EVERY 30 MIN PRN
Status: DISCONTINUED | OUTPATIENT
Start: 2023-07-03 | End: 2023-07-03 | Stop reason: HOSPADM

## 2023-07-03 RX ORDER — MIRABEGRON 25 MG/1
50 TABLET, FILM COATED, EXTENDED RELEASE ORAL DAILY
Status: DISCONTINUED | OUTPATIENT
Start: 2023-07-03 | End: 2023-07-21 | Stop reason: HOSPADM

## 2023-07-03 RX ORDER — HYDROXYZINE HYDROCHLORIDE 25 MG/1
50 TABLET, FILM COATED ORAL AT BEDTIME
COMMUNITY
Start: 2023-05-11

## 2023-07-03 RX ORDER — KETAMINE HYDROCHLORIDE 10 MG/ML
INJECTION INTRAMUSCULAR; INTRAVENOUS PRN
Status: DISCONTINUED | OUTPATIENT
Start: 2023-07-03 | End: 2023-07-03

## 2023-07-03 RX ORDER — ASPIRIN 81 MG/1
81 TABLET ORAL DAILY
Status: DISCONTINUED | OUTPATIENT
Start: 2023-07-03 | End: 2023-07-21 | Stop reason: HOSPADM

## 2023-07-03 RX ADMIN — BUPIVACAINE HYDROCHLORIDE 20 ML: 2.5 INJECTION, SOLUTION EPIDURAL; INFILTRATION; INTRACAUDAL at 07:55

## 2023-07-03 RX ADMIN — Medication 81 MG: at 18:14

## 2023-07-03 RX ADMIN — LABETALOL HYDROCHLORIDE 5 MG: 5 INJECTION, SOLUTION INTRAVENOUS at 14:06

## 2023-07-03 RX ADMIN — DEXAMETHASONE SODIUM PHOSPHATE 8 MG: 10 INJECTION, SOLUTION INTRAMUSCULAR; INTRAVENOUS at 07:49

## 2023-07-03 RX ADMIN — PHENYLEPHRINE HYDROCHLORIDE 100 MCG: 10 INJECTION INTRAVENOUS at 10:45

## 2023-07-03 RX ADMIN — PROPOFOL 170 MG: 10 INJECTION, EMULSION INTRAVENOUS at 07:49

## 2023-07-03 RX ADMIN — PHENYLEPHRINE HYDROCHLORIDE 100 MCG: 10 INJECTION INTRAVENOUS at 08:18

## 2023-07-03 RX ADMIN — ROCURONIUM BROMIDE 20 MG: 50 INJECTION, SOLUTION INTRAVENOUS at 09:16

## 2023-07-03 RX ADMIN — ARIPIPRAZOLE 30 MG: 15 TABLET ORAL at 21:37

## 2023-07-03 RX ADMIN — MELOXICAM 7.5 MG: 7.5 TABLET ORAL at 18:14

## 2023-07-03 RX ADMIN — FENTANYL CITRATE 50 MCG: 50 INJECTION, SOLUTION INTRAMUSCULAR; INTRAVENOUS at 13:04

## 2023-07-03 RX ADMIN — FLUTICASONE PROPIONATE 2 SPRAY: 50 SPRAY, METERED NASAL at 18:24

## 2023-07-03 RX ADMIN — PHENYLEPHRINE HYDROCHLORIDE 100 MCG: 10 INJECTION INTRAVENOUS at 08:07

## 2023-07-03 RX ADMIN — DOXEPIN 3 MG: 3 TABLET, FILM COATED ORAL at 21:37

## 2023-07-03 RX ADMIN — PROPOFOL 30 MG: 10 INJECTION, EMULSION INTRAVENOUS at 13:46

## 2023-07-03 RX ADMIN — PHENYLEPHRINE HYDROCHLORIDE 100 MCG: 10 INJECTION INTRAVENOUS at 09:22

## 2023-07-03 RX ADMIN — LORAZEPAM 0.5 MG: 0.5 TABLET ORAL at 19:59

## 2023-07-03 RX ADMIN — ONDANSETRON 4 MG: 2 INJECTION INTRAMUSCULAR; INTRAVENOUS at 13:43

## 2023-07-03 RX ADMIN — LABETALOL HYDROCHLORIDE 5 MG: 5 INJECTION, SOLUTION INTRAVENOUS at 13:44

## 2023-07-03 RX ADMIN — ROCURONIUM BROMIDE 30 MG: 50 INJECTION, SOLUTION INTRAVENOUS at 10:37

## 2023-07-03 RX ADMIN — Medication 3 G: at 12:06

## 2023-07-03 RX ADMIN — ACETAMINOPHEN 975 MG: 325 TABLET ORAL at 06:58

## 2023-07-03 RX ADMIN — PHENYLEPHRINE HYDROCHLORIDE 100 MCG: 10 INJECTION INTRAVENOUS at 08:39

## 2023-07-03 RX ADMIN — ROCURONIUM BROMIDE 20 MG: 50 INJECTION, SOLUTION INTRAVENOUS at 08:21

## 2023-07-03 RX ADMIN — ONDANSETRON 4 MG: 2 INJECTION INTRAMUSCULAR; INTRAVENOUS at 07:02

## 2023-07-03 RX ADMIN — ROCURONIUM BROMIDE 50 MG: 50 INJECTION, SOLUTION INTRAVENOUS at 07:49

## 2023-07-03 RX ADMIN — Medication 5 MG: at 10:14

## 2023-07-03 RX ADMIN — KETAMINE HYDROCHLORIDE 40 MG: 10 INJECTION, SOLUTION INTRAMUSCULAR; INTRAVENOUS at 07:49

## 2023-07-03 RX ADMIN — FENTANYL CITRATE 50 MCG: 50 INJECTION, SOLUTION INTRAMUSCULAR; INTRAVENOUS at 08:58

## 2023-07-03 RX ADMIN — HYDROMORPHONE HYDROCHLORIDE 0.5 MG: 1 INJECTION, SOLUTION INTRAMUSCULAR; INTRAVENOUS; SUBCUTANEOUS at 12:15

## 2023-07-03 RX ADMIN — HEPARIN SODIUM 5000 UNITS: 10000 INJECTION, SOLUTION INTRAVENOUS; SUBCUTANEOUS at 06:55

## 2023-07-03 RX ADMIN — MAGNESIUM SULFATE HEPTAHYDRATE 4 G: 80 INJECTION, SOLUTION INTRAVENOUS at 06:39

## 2023-07-03 RX ADMIN — ROCURONIUM BROMIDE 10 MG: 50 INJECTION, SOLUTION INTRAVENOUS at 12:07

## 2023-07-03 RX ADMIN — PHENYLEPHRINE HYDROCHLORIDE 200 MCG: 10 INJECTION INTRAVENOUS at 10:12

## 2023-07-03 RX ADMIN — BUPIVACAINE 20 ML: 13.3 INJECTION, SUSPENSION, LIPOSOMAL INFILTRATION at 07:55

## 2023-07-03 RX ADMIN — GABAPENTIN 100 MG: 100 CAPSULE ORAL at 20:01

## 2023-07-03 RX ADMIN — KETAMINE HYDROCHLORIDE 10 MG: 10 INJECTION, SOLUTION INTRAMUSCULAR; INTRAVENOUS at 09:53

## 2023-07-03 RX ADMIN — HYDROXYZINE HYDROCHLORIDE 50 MG: 50 TABLET ORAL at 21:37

## 2023-07-03 RX ADMIN — SODIUM CHLORIDE, POTASSIUM CHLORIDE, SODIUM LACTATE AND CALCIUM CHLORIDE: 600; 310; 30; 20 INJECTION, SOLUTION INTRAVENOUS at 13:00

## 2023-07-03 RX ADMIN — HYDROMORPHONE HYDROCHLORIDE 0.4 MG: 0.2 INJECTION, SOLUTION INTRAMUSCULAR; INTRAVENOUS; SUBCUTANEOUS at 20:10

## 2023-07-03 RX ADMIN — ROCURONIUM BROMIDE 10 MG: 50 INJECTION, SOLUTION INTRAVENOUS at 12:44

## 2023-07-03 RX ADMIN — PROPOFOL 20 MCG/KG/MIN: 10 INJECTION, EMULSION INTRAVENOUS at 08:25

## 2023-07-03 RX ADMIN — ROCURONIUM BROMIDE 10 MG: 50 INJECTION, SOLUTION INTRAVENOUS at 09:53

## 2023-07-03 RX ADMIN — ACETAMINOPHEN 975 MG: 325 TABLET ORAL at 18:14

## 2023-07-03 RX ADMIN — SODIUM CHLORIDE, POTASSIUM CHLORIDE, SODIUM LACTATE AND CALCIUM CHLORIDE: 600; 310; 30; 20 INJECTION, SOLUTION INTRAVENOUS at 06:32

## 2023-07-03 RX ADMIN — Medication 3 G: at 08:08

## 2023-07-03 RX ADMIN — Medication 5 MG: at 10:46

## 2023-07-03 RX ADMIN — METRONIDAZOLE 500 MG: 500 INJECTION, SOLUTION INTRAVENOUS at 06:50

## 2023-07-03 RX ADMIN — SODIUM CHLORIDE, POTASSIUM CHLORIDE, SODIUM LACTATE AND CALCIUM CHLORIDE: 600; 310; 30; 20 INJECTION, SOLUTION INTRAVENOUS at 22:07

## 2023-07-03 RX ADMIN — SODIUM CHLORIDE, POTASSIUM CHLORIDE, SODIUM LACTATE AND CALCIUM CHLORIDE: 600; 310; 30; 20 INJECTION, SOLUTION INTRAVENOUS at 09:58

## 2023-07-03 RX ADMIN — PHENYLEPHRINE HYDROCHLORIDE 200 MCG: 10 INJECTION INTRAVENOUS at 08:20

## 2023-07-03 RX ADMIN — FLUOXETINE 60 MG: 20 CAPSULE ORAL at 18:14

## 2023-07-03 RX ADMIN — FENTANYL CITRATE 100 MCG: 50 INJECTION, SOLUTION INTRAMUSCULAR; INTRAVENOUS at 07:49

## 2023-07-03 RX ADMIN — INDOCYANINE GREEN AND WATER 5 MG: KIT at 11:56

## 2023-07-03 RX ADMIN — MIDAZOLAM 1 MG: 1 INJECTION INTRAMUSCULAR; INTRAVENOUS at 09:17

## 2023-07-03 RX ADMIN — MIRABEGRON 50 MG: 25 TABLET, FILM COATED, EXTENDED RELEASE ORAL at 18:22

## 2023-07-03 RX ADMIN — PHENYLEPHRINE HYDROCHLORIDE 100 MCG: 10 INJECTION INTRAVENOUS at 10:10

## 2023-07-03 RX ADMIN — SUGAMMADEX 250 MG: 100 INJECTION, SOLUTION INTRAVENOUS at 13:56

## 2023-07-03 RX ADMIN — ROCURONIUM BROMIDE 20 MG: 50 INJECTION, SOLUTION INTRAVENOUS at 11:29

## 2023-07-03 RX ADMIN — MIDAZOLAM 1 MG: 1 INJECTION INTRAMUSCULAR; INTRAVENOUS at 07:49

## 2023-07-03 RX ADMIN — DOXAZOSIN 2 MG: 1 TABLET ORAL at 20:00

## 2023-07-03 ASSESSMENT — ACTIVITIES OF DAILY LIVING (ADL)
ADLS_ACUITY_SCORE: 20
ADLS_ACUITY_SCORE: 18
ADLS_ACUITY_SCORE: 20

## 2023-07-03 NOTE — ANESTHESIA POSTPROCEDURE EVALUATION
Patient: Dayday Silva    Procedure: Procedure(s):  ROBOTIC LEFT COLECTOMY, DIAGNOSTIC LAPAROSCOPY, MOBILIZATION OF SPLENIC FLEXURE  COLONOSCOPY       Anesthesia Type:  General    Note:  Disposition: Inpatient   Postop Pain Control: Uneventful            Sign Out: Well controlled pain   PONV: No   Neuro/Psych: Uneventful            Sign Out: Acceptable/Baseline neuro status   Airway/Respiratory: Uneventful            Sign Out: Acceptable/Baseline resp. status; O2 supplementation               Oxygen: Nasal Cannula   CV/Hemodynamics: Uneventful            Sign Out: Acceptable CV status; No obvious hypovolemia; No obvious fluid overload   Other NRE: NONE   DID A NON-ROUTINE EVENT OCCUR? No    Event details/Postop Comments:  CXR ordered and completed. Awaiting final read but no significant abnormalities noted. Pt is now on nasal canula 4L and SpO2 is 94-95% which is improved from his CPAP use. Baseline sats were 93% on RA. Planned inpatient admission.           Last vitals:  Vitals Value Taken Time   /78 07/03/23 1630   Temp 37.2  C (98.9  F) 07/03/23 1600   Pulse 89 07/03/23 1641   Resp 20 07/03/23 1634   SpO2 95 % 07/03/23 1642   Vitals shown include unvalidated device data.    Electronically Signed By: Syd Beard MD  July 3, 2023  4:59 PM

## 2023-07-03 NOTE — PLAN OF CARE
Goal Outcome Evaluation:      Outcome:    Shift  Intake: 960 ml  Output: 1200 ml salas; clear, yellow.   Pain: 8/10; PRN Dilaudid and scheduled med given; per pt helpful.  Incision: clean, dry and intact.   Nausea: None  Activity: ambulated the hallways up to 300 feet with assist of 2, gait belt and walker, oxygen tank, IV pole.  Incentive Spirometry: achieved 1000 ml. Tolerated fairly.  Abdominal Binder: On  SCDs on.  CPAP on with 4 L oxygen; O2 sat 92-94%.  Michelle Munson RN  7/3/2023  5:28 PM

## 2023-07-03 NOTE — PROGRESS NOTES
Start CPAP 8 cm per MD request, titrated O2 to 50 % to maintain SpO2 > = 90 %. Pt tolerated well with medium full face mask. BS clear bilat, diminished bases with shallow breathing. RT to monitor.

## 2023-07-03 NOTE — ANESTHESIA PROCEDURE NOTES
TAP Procedure Note    Pre-Procedure   Staff -        Anesthesiologist:  Neptali Reyna MD       Performed By: anesthesiologist       Location: OR       Procedure Start/Stop Times: 7/3/2023 7:55 AM and 7/3/2023 8:00 AM       Pre-Anesthestic Checklist: patient identified, IV checked, site marked, risks and benefits discussed, informed consent, monitors and equipment checked, pre-op evaluation, at physician/surgeon's request and post-op pain management  Timeout:       Correct Patient: Yes        Correct Procedure: Yes        Correct Site: Yes        Correct Position: Yes        Correct Laterality: Yes        Site Marked: Yes  Procedure Documentation  Procedure: TAP       Laterality: bilateral       Patient Position: supine       Skin prep: Chloraprep       Needle Type: short bevel       Needle Gauge: 20.        Needle Length (Inches): 6                 Ultrasound guided       1. Ultrasound was used to identify targeted nerve, plexus, vascular marker, or fascial plane and place a needle adjacent to it in real-time.       2. Ultrasound was used to visualize the spread of anesthetic in close proximity to the above referenced structure.       3. A permanent image is entered into the patient's record.       4. The visualized anatomic structures appeared normal.       5. There were no apparent abnormal pathologic findings.    Assessment/Narrative         The placement was negative for: blood aspirated, painful injection and site bleeding       Paresthesias: No.       Bolus given via needle..        Secured via.        Insertion/Infusion Method: Single Shot       Complications: none       Injection made incrementally with aspirations every 5 mL.    Medication(s) Administered   Bupivacaine 0.25% PF (Infiltration) - Infiltration   20 mL - 7/3/2023 7:55:00 AM  Bupivacaine liposome (Exparel) 1.3% LA inj susp (Infiltration) - Infiltration   20 mL - 7/3/2023 7:55:00 AM  Medication Administration Time: 7/3/2023 7:55 AM      FOR  "[facility] ONLY:   Pain Team Contact information: please page the Pain Team Via [employer]. Search \"Pain\". During daytime hours, please page the attending first. At night please page the resident first.      "

## 2023-07-03 NOTE — ANESTHESIA PROCEDURE NOTES
Airway       Patient location during procedure: OR       Procedure Start/Stop Times: 7/3/2023 7:53 AM  Staff -        Anesthesiologist:  Neptali Reyna MD       CRNA: Logan Montaño APRN CRNA       Performed By: CRNA  Consent for Airway        Urgency: elective  Indications and Patient Condition       Indications for airway management: violet-procedural       Induction type:intravenous       Mask difficulty assessment: 2 - vent by mask + OA or adjuvant +/- NMBA    Final Airway Details       Final airway type: endotracheal airway       Successful airway: ETT - single  Endotracheal Airway Details        ETT size (mm): 8.0       Cuffed: yes       Successful intubation technique: video laryngoscopy       VL Blade Size: Glidescope 4       Grade View of Cords: 1       Adjucts: stylet       Position: Left       Measured from: lips       Secured at (cm): 24    Post intubation assessment        Placement verified by: capnometry, equal breath sounds and chest rise        Number of attempts at approach: 1       Number of other approaches attempted: 0       Secured with: silk tape       Ease of procedure: easy       Dentition: Intact and Unchanged    Medication(s) Administered   Medication Administration Time: 7/3/2023 7:53 AM

## 2023-07-03 NOTE — OR NURSING
Upon arrival to PACU  sats 88-90%.  Anesthesia called, Dr. Reyna at bedside.  RT called for CPAP /BIPAP.  Pt remains sleepy.  Pt placed on CPAP

## 2023-07-03 NOTE — PROGRESS NOTES
Changed to patient's home CPAP unit 7 cm, O2 bleed in 5 lpm, SpO2 89-90, increased to 6 lpm bleed in. RN to monitor, RT to assist as needed.    BP (!) 161/83   Pulse 84   Temp 99.3  F (37.4  C) (Temporal)   Resp 16   Wt 121.1 kg (267 lb)   SpO2 90%   BMI 44.43 kg/m

## 2023-07-03 NOTE — ANESTHESIA CARE TRANSFER NOTE
Patient: Dayday Silva    Procedure: Procedure(s):  ROBOTIC LEFT COLECTOMY, DIAGNOSTIC LAPAROSCOPY, MOBILIZATION OF SPLENIC FLEXURE  COLONOSCOPY       Diagnosis: Colon cancer (H) [C18.9]  Diagnosis Additional Information: No value filed.    Anesthesia Type:   General     Note:    Oropharynx: oropharynx clear of all foreign objects  Level of Consciousness: drowsy  Oxygen Supplementation: face mask  Level of Supplemental Oxygen (L/min / FiO2): 10  Independent Airway: airway patency satisfactory and stable  Dentition: dentition unchanged  Vital Signs Stable: post-procedure vital signs reviewed and stable  Report to RN Given: handoff report given  Patient transferred to: PACU    Handoff Report: Identifed the Patient, Identified the Reponsible Provider, Reviewed the pertinent medical history, Discussed the surgical course, Reviewed Intra-OP anesthesia mangement and issues during anesthesia, Set expectations for post-procedure period and Allowed opportunity for questions and acknowledgement of understanding      Vitals:  Vitals Value Taken Time   /60 07/03/23 1415   Temp 36.8  C (98.3  F) 07/03/23 1413   Pulse 83 07/03/23 1416   Resp 14 07/03/23 1416   SpO2 92 % 07/03/23 1416   Vitals shown include unvalidated device data.    Electronically Signed By: CATIE Perez CRNA  July 3, 2023  2:17 PM

## 2023-07-03 NOTE — BRIEF OP NOTE
[facility]    Brief Operative Note    Pre-operative diagnosis: Colon cancer (H) [C18.9]  Post-operative diagnosis Same as pre-operative diagnosis    Procedure: Procedure(s):  ROBOTIC LEFT COLECTOMY, DIAGNOSTIC LAPAROSCOPY, MOBILIZATION OF SPLENIC FLEXURE  COLONOSCOPY  Surgeon: Surgeon(s) and Role:     * John Paul Valadez MD - Primary  Anesthesia: General with Block   Estimated Blood Loss: 50mL     Drains: None  Specimens:   ID Type Source Tests Collected by Time Destination   1 : LEFT EXTENDED COLON, OPEN AND RETURN Tissue Large Intestine, Colon SURGICAL PATHOLOGY EXAM John Paul Valadez MD 7/3/2023  8:31 AM      Findings:   Tattoo at splenic flexure as visualized on colonoscopy, widely patent handsewn colo-colonic anastomosis, negative air leak test with rigid proctoscopy.  Complications: None.  Implants: * No implants in log *    Condition on discharge from OR: Satisfactory    Odalys Couch MD   [facility]   [phone].        ADDENDUM:    PATIENT DATA  Indicate Y or N:  Home O2 No  Hemodialysis  No  Transplant patient  No  Cirrhosis  No  Steroids in last 30 days  No  Immunomodulators in last 30 days  No  Anticoagulation at time of surgery  Yes   List medication aspirin  Prior abdominal surgery  Yes  Pelvic irradiation  No    Albumin within 30 days if known    Hgb within 30 days if known 13.7  Cr within 30 days if known 0.94  Body mass index is 44.43 kg/m .      OR DATA  Emergent  No   <24 hours  No   <1 week  No  Bowel Prep Yes  Antibiotics  No  DVT prophylaxis    Heparin  Yes   SCD  Yes   None  No  Drain  No  ASA (1,2,3,4) 3  OR time (min) 339  Stents  No  Transfuse >/= 2U  No  Anastomosis   Stapled  No   Handsewn  Yes  Leak Test    Positive  No   Negative  Yes   Not done  No    FOR CANCER ALSO COMPLETE:  Preoperative treatment (Y or N)   Chemo  No   Radiation No   Diversion  No    Preoperative Stage   CT  Yes   MRI No   US  No   Clinical  Yes   Unknown  No   T  stage (1,2,3,4) 4   N stage (0,1,2) 2   M stage (0,1) 0  CEA 2.4  Metastatic disease at time of operation  No       Route (Have a good day)

## 2023-07-03 NOTE — OR NURSING
Removed Cpap mask for a brief moment to give patient and ice chips and to have him cough and deep breath. Placed on NC 4 liters , sats now 95%

## 2023-07-03 NOTE — OP NOTE
COLORECTAL SURGERY OPERATIVE NOTE    Date of Service: 7/3/2023    Pre-Operative Diagnosis:   1. Colon cancer  2. BMI 44    Post-Operative Diagnosis:   1. Colon cancer of the splenic flexure flexure  2. BMI 44     Procedure:   1. Robotic extended left colectomy  2. Mobilization of the splenic flexure  3. Colonoscopy    Surgeon: John Paul Valadez MD    Assistant: Mariana Bowman PA-C    Second Assistant: Dayana Couch MD [facility] Colorectal Surgery Fellow    Anesthesia: General     Estimated Blood Loss: 50 ml    Bowel Prep: Mechanical and Antibiotic    Intent: Curative    Vascular Pedicle Ligation:   1. Left branch of middle colic artery  2. Left colic artery    Indication: The patient is a 58 year old male with a history of cancer and a polyp that was resected in a piecemeal fashion with indeterminate margins and lymphovascular invasion.  We discussed the the procedure of robotic assisted left colectomy (possible open). Risks outlined include bleeding, infection (anastomotic leak <5%), and damage to surrounding structures including the small bowel and ureter. Please see my office note for the details of our discussion.      Findings: There is no evidence of metastatic disease on the liver, surface of the stomach, or in the pelvis.  Initially, we we were unable to find the tattoo.  There was a significant amount of fat wrapping around the colon so visualization was very difficult.  We did a colonoscopy that localized the tattoo underneath the splenic flexure.  Extended left colectomy was done with a handsewn end-to-end anastomosis from the transverse colon to the sigmoid colon.  A leak test was negative.    Specimen: Extended left colectomy    Operative Details: After informed consent was obtained, the patient was brought to the operating room and placed in supine position on the operating room table. They were given 5000 units of subcutaneous heparin in the prep-operative area. Sequential compression devices  were placed on bilateral lower extremities prior to induction.  General anesthesia was induced without difficulty. TAP blocks were performed by anesthesia. The patient was placed in well-padded dorsal lithotomy position on a pink pad and IV antibiotics were administered per the ERAS protocol. A rectal washout with betadine was performed by myself. A salas catheter was placed by the operating room nurse without difficulty. The patient's abdomen was sterilely prepped and draped in usual fashion. A timeout was performed per protocol.     We began with a Veress needle entry in the left upper quadrant. This needle was passed on the first attempt. The entry pressure was 3mmHg. The peritoneal cavity was insufflated to 15mmHg. An 8mm trochar was placed in the left mid-abdomen. The 8mm 30 degree robotic camera was placed. There was no evidence of injury from the Veress needle.     Initially, we looked around the peritoneal cavity to see if I could localize the tattoo.  I was unable to.  There was a significant amount of both omental fat and mesenteric fat around the colon on all sides.  I then placed 2 more 8 mm trocars for graspers as well as a 5 mm AirSeal port.  From this approach, I ran the colon from the sigmoid to the proximal transverse colon, but I could not see any tattoo.  I did this a few times and was unable to see anything.  However, the mesenteric fat was really wrapping around the colon and the omentum was quite adherent to it so it was certainly possible that the tattoo was underneath this fat.  Therefore, I did a colonoscopy.  The colonoscope was advanced to the cecum and then withdrawn.  I could then see the tattoo very clearly in what ended up being the splenic flexure where the colon was densely adherent to the spleen as well as to the omentum.  I then put in 1 more 8 mm port and docked facing the left upper quadrant for my extended left colectomy.    The lesser sac was entered between the transverse  colon and greater omentum.  There was a significant amount of omentum here and it was very densely adherent to the mesentery.  However, we are eventually able to separate the structures.  The apex of the splenic flexure was also very adherent to both the omentum as well as the capsule of the spleen.  There was a small amount of bleeding here from her surface of the spleen which was controlled using electrocautery.  I also treated the area with Surgicel powder.  I then turned my attention to the lateral side.  The plane between the mesentery and the retroperitoneum was entered near the sigmoid colon.  I continued up the lateral to medial dissection up to the point of the splenic flexure.  From here, I was able to meet my dissection from the other side and now the splenic flexure was completely mobilized.    I then upsized the right lower quadrant trocar site to 12 mm.  A window was created in the mid transverse colon and the colon was divided using a single firing of the robotic MAG 60 mm blue load stapler.  I then elevated the mesentery so I could clearly see the left branch of the middle colic artery heading up towards the specimen.  This was divided using the vessel sealer device.  Next, the remainder of the mesentery was divided towards the splenic flexure using the vessel sealer.  I next turned my attention to the sigmoid colon.  A window was created around the sigmoid here and the colon was divided in the same way.  Initially, we were unable to do this through the right lower quadrant port, so I upsized one of the left-sided ports to 12 mm as well and this allowed us to transect the colon cleanly.  I then elevated the mesentery and continued down to the left colic artery.  This was divided using the robotic vessel sealer device.  I then divided the mesentery back up towards this point of the splenic flexure.  At this point, the specimen was completely free.  It was placed up and over the liver.  I then turned  my attention to anastomosis.    I initially attempted to do an isoperistaltic intracorporeal anastomosis from the transverse colon to the sigmoid colon.  However, there was just a little bit too much tension to orient to the colon appropriately this way particularly as the small bowel kept getting underneath the mesentery.  Therefore, I mobilized the sigmoid a little bit more laterally and elected to do an anastomosis through the extraction port.  However, prior to doing this, I closed both 12 mm trocar sites using interrupted 0 Vicryl sutures and a Bobby Tony device.  I also inspected the capsule of the spleen to make sure that it was hemostatic and it looked good.    An 8 cm lower midline incision was created.  This was taken through the skin and subcutaneous tissues to the level of the fascia which was opened over a gloved finger.  We entered the peritoneal cavity.  A large Dax wound protector was placed.  The specimen was extracted and passed off of the field.  It was sent for open and returned and I did confirm the fact that we had at least 5 cm distal to our cancer site for a margin.  Sterile blue towels were then placed down.  I lined up the 2 ends of the colon and they look very good without any size mismatch.  The staple lines were cut off.  A double layer handsewn end-to-end anastomosis was performed using a back layer of 3-0 interrupted Vicryl sutures and a running layer of 3-0 PDS.  My assistant then went below and did a rigid proctoscopy to insufflate the anastomosis while I submerged it underneath saline.  There was no evidence of any bubbling.  The test was negative.  We then turned our attention to closure.    Gowns, gloves, and instruments were changed for the clean closure portion of the procedure.  The fascia was closed using running #1 PDS suture.  The soft tissues were irrigated out.  The skin sites were closed with 4-0 Monocryl and Dermabond.  This completed our planned procedure.    At  this point our planned procedure was completed. All sponge and instrument counts were correct x 2.    Complications: None    Disposition: Stable, extubated, to PACU    John Paul Valadez MD, SEAN  [facility]  Office: [phone]  7/3/2023 2:00 PM

## 2023-07-03 NOTE — PHARMACY-ADMISSION MEDICATION HISTORY
Pharmacist Admission Medication History    Admission medication history is complete. The information provided in this note is only as accurate as the sources available at the time of the update.    Medication reconciliation/reorder completed by provider prior to medication history? No    Information Source(s): Patient, Family member, Facility (TCU/NH/) medication list/MAR and CareEverywhere/SureScripts via in-person. Confirmed with patient and son last dose times since facility only sent Gladys MAR but patient confirms no changes in medications over the weekend.    Pertinent Information: Son confirmed that patient received metronidazole and neomycin yesterday x 3 doses preop. Please note patient takes Myrbetriq and Toviaz.     Changes made to PTA medication list:    Added: Doxazosin, TUMS, meloxicam, senna-docusate    Deleted: None    Changed: Famotidine BID to BID PRN, melatonin PRN to HS    Medication Affordability:  Not including over the counter (OTC) medications, was there a time in the past 3 months when you did not take your medications as prescribed because of cost?: Unable to Assess    Allergies reviewed with patient and updates made in EHR: yes    Medication History Completed By: Natalia Keys, PharmD 7/3/2023 7:33 AM    Prior to Admission medications    Medication Sig Last Dose Taking? Auth Provider Long Term End Date   acetaminophen (TYLENOL) 500 MG tablet Take 1,000 mg by mouth every 6 hours as needed for mild pain Unknown at PRN Yes Provider, Historical     ARIPiprazole (ABILIFY) 30 MG tablet Take 30 mg by mouth At Bedtime 7/2/2023 at 1900 Yes Reported, Patient     ASPIRIN LOW DOSE 81 MG EC tablet Take 81 mg by mouth daily 7/1/2023 at 0700 Yes Reported, Patient No    calcium carbonate 500 mg, elemental, 1250 (500 Ca) MG tablet chewable Take 1 tablet by mouth daily as needed for heartburn Unknown at PRN Yes Unknown, Entered By History     doxazosin (CARDURA) 2 MG tablet Take 2 mg by mouth every  evening 7/2/2023 at 2000 Yes Unknown, Entered By History Yes    doxepin (SILENOR) 3 MG tablet Take 3 mg by mouth At Bedtime 7/2/2023 at 2000 Yes Reported, Patient     famotidine (PEPCID) 20 MG tablet Take 20 mg by mouth 2 times daily as needed (heartburn) Unknown at PRN Yes Reported, Patient     FLUoxetine (PROZAC) 20 MG capsule Take 20 mg by mouth daily Along with 40mg capsule for total 60mg daily 7/2/2023 at 0700 Yes Reported, Patient     FLUoxetine (PROZAC) 40 MG capsule Take 40 mg by mouth daily Along with 20mg capsule for total 60mg daily 7/2/2023 at 0700 Yes Provider, Historical     fluticasone (FLONASE) 50 MCG/ACT nasal spray Spray 2 sprays into both nostrils daily 7/2/2023 at 0700 Yes Reported, Patient     gabapentin (NEURONTIN) 300 MG capsule Take 300 mg by mouth 3 times daily 7/2/2023 at 2000 Yes Reported, Patient     hydrOXYzine (ATARAX) 25 MG tablet Take 50 mg by mouth At Bedtime 7/2/2023 at 2000 Yes Unknown, Entered By History     ketotifen (ZADITOR/ZYRTEC ITCHY EYES) 0.025 % (0.035 %) ophthalmic solution [KETOTIFEN (ZADITOR/ZYRTEC ITCHY EYES) 0.025 % (0.035 %) OPHTHALMIC SOLUTION] INSTILL ONE DROP IN BOTH EYES UP TO TWICE DAILY AS NEEDED **12 TOTAL FILLS*  Patient taking differently: Place 1 drop into both eyes 2 times daily as needed for dry eyes or itching Unknown at PRN Yes Cheryl Parikh MD     loratadine (CLARITIN) 10 mg tablet [LORATADINE (CLARITIN) 10 MG TABLET] Take 1 tablet (10 mg total) by mouth daily. 7/2/2023 at 0700 Yes Cheryl Parikh MD     LORazepam (ATIVAN) 0.5 MG tablet Take 0.5 mg by mouth 2 times daily 7/2/2023 at 2000 Yes Unknown, Entered By History     melatonin 5 MG tablet Take 10 mg by mouth At Bedtime 7/2/2023 at 2000 Yes Reported, Patient     meloxicam (MOBIC) 7.5 MG tablet Take 7.5 mg by mouth 2 times daily (with meals) 7/1/2023 at 0900 Yes Unknown, Entered By History     mirabegron (MYRBETRIQ) 50 mg Tb24 ER tablet [MIRABEGRON (MYRBETRIQ) 50 MG TB24 ER TABLET]  Take 50 mg by mouth daily. 7/2/2023 at 0700 Yes Provider, Historical     multivitamin with folic acid (ONE DAILY MULTIVITAMIN) 400 mcg Tab [MULTIVITAMIN WITH FOLIC ACID (ONE DAILY MULTIVITAMIN) 400 MCG TAB] Take 1 tablet by mouth daily. 6/25/2023 at 0700 Yes Krista Del Toro     polyethylene glycol (MIRALAX) 17 gram packet [POLYETHYLENE GLYCOL (MIRALAX) 17 GRAM PACKET] DISSOLVE 1 PACKET INTO LIQUID AND DRINK BY MOUTH EVERY OTHER DAY;INCREASE TO ONCE DAILY DOSE IF CONSTIPATED.  Patient taking differently: Take 1 packet by mouth every other day 7/1/2023 at 0800 Yes Cheryl Parikh MD     senna-docusate (SENOKOT-S/PERICOLACE) 8.6-50 MG tablet Take 1 tablet by mouth 2 times daily as needed for constipation Unknown at PRN Yes Unknown, Entered By History     TOVIAZ 4 mg Tb24 ER tablet Take 4 mg by mouth daily 7/2/2023 at 0700 Yes Provider, Historical Yes    trolamine salicylate-aloe vera (ASPERCREME WITH ALOE) 10 % Crea [TROLAMINE SALICYLATE-ALOE VERA (ASPERCREME WITH ALOE) 10 % CREA] Apply to left shoulder area 3x daily as needed.  Patient taking differently: Apply topically 3 times daily as needed for other (Left shoulder pain) Unknown at PRN Yes Cheryl Parikh MD

## 2023-07-04 ENCOUNTER — APPOINTMENT (OUTPATIENT)
Dept: PHYSICAL THERAPY | Facility: HOSPITAL | Age: 58
DRG: 330 | End: 2023-07-04
Payer: COMMERCIAL

## 2023-07-04 ENCOUNTER — APPOINTMENT (OUTPATIENT)
Dept: RADIOLOGY | Facility: HOSPITAL | Age: 58
DRG: 330 | End: 2023-07-04
Attending: COLON & RECTAL SURGERY
Payer: COMMERCIAL

## 2023-07-04 ENCOUNTER — APPOINTMENT (OUTPATIENT)
Dept: OCCUPATIONAL THERAPY | Facility: HOSPITAL | Age: 58
DRG: 330 | End: 2023-07-04
Payer: COMMERCIAL

## 2023-07-04 LAB
ANION GAP SERPL CALCULATED.3IONS-SCNC: 10 MMOL/L (ref 7–15)
BUN SERPL-MCNC: 11.6 MG/DL (ref 8–23)
CALCIUM SERPL-MCNC: 8.8 MG/DL (ref 8.6–10)
CHLORIDE SERPL-SCNC: 104 MMOL/L (ref 98–107)
CREAT SERPL-MCNC: 0.98 MG/DL (ref 0.67–1.17)
DEPRECATED HCO3 PLAS-SCNC: 25 MMOL/L (ref 22–29)
ERYTHROCYTE [DISTWIDTH] IN BLOOD BY AUTOMATED COUNT: 14.2 % (ref 10–15)
GFR SERPL CREATININE-BSD FRML MDRD: 89 ML/MIN/1.73M2
GLUCOSE SERPL-MCNC: 97 MG/DL (ref 70–99)
HCT VFR BLD AUTO: 38.9 % (ref 40–53)
HGB BLD-MCNC: 12 G/DL (ref 13.3–17.7)
MAGNESIUM SERPL-MCNC: 2.5 MG/DL (ref 1.7–2.3)
MCH RBC QN AUTO: 28.4 PG (ref 26.5–33)
MCHC RBC AUTO-ENTMCNC: 30.8 G/DL (ref 31.5–36.5)
MCV RBC AUTO: 92 FL (ref 78–100)
PLATELET # BLD AUTO: 171 10E3/UL (ref 150–450)
POTASSIUM SERPL-SCNC: 4 MMOL/L (ref 3.4–5.3)
RBC # BLD AUTO: 4.22 10E6/UL (ref 4.4–5.9)
SODIUM SERPL-SCNC: 139 MMOL/L (ref 136–145)
WBC # BLD AUTO: 11.5 10E3/UL (ref 4–11)

## 2023-07-04 PROCEDURE — 71045 X-RAY EXAM CHEST 1 VIEW: CPT

## 2023-07-04 PROCEDURE — 97116 GAIT TRAINING THERAPY: CPT | Mod: GP

## 2023-07-04 PROCEDURE — 83735 ASSAY OF MAGNESIUM: CPT | Performed by: COLON & RECTAL SURGERY

## 2023-07-04 PROCEDURE — 120N000001 HC R&B MED SURG/OB

## 2023-07-04 PROCEDURE — 97161 PT EVAL LOW COMPLEX 20 MIN: CPT | Mod: GP

## 2023-07-04 PROCEDURE — 999N000157 HC STATISTIC RCP TIME EA 10 MIN

## 2023-07-04 PROCEDURE — 258N000003 HC RX IP 258 OP 636

## 2023-07-04 PROCEDURE — 82310 ASSAY OF CALCIUM: CPT

## 2023-07-04 PROCEDURE — 36415 COLL VENOUS BLD VENIPUNCTURE: CPT

## 2023-07-04 PROCEDURE — 250N000013 HC RX MED GY IP 250 OP 250 PS 637

## 2023-07-04 PROCEDURE — 250N000011 HC RX IP 250 OP 636: Mod: JZ

## 2023-07-04 PROCEDURE — 97110 THERAPEUTIC EXERCISES: CPT | Mod: GP

## 2023-07-04 PROCEDURE — 97535 SELF CARE MNGMENT TRAINING: CPT | Mod: GO

## 2023-07-04 PROCEDURE — 85027 COMPLETE CBC AUTOMATED: CPT

## 2023-07-04 PROCEDURE — 97166 OT EVAL MOD COMPLEX 45 MIN: CPT | Mod: GO

## 2023-07-04 RX ORDER — ALBUTEROL SULFATE 0.83 MG/ML
2.5 SOLUTION RESPIRATORY (INHALATION)
Status: DISCONTINUED | OUTPATIENT
Start: 2023-07-04 | End: 2023-07-21 | Stop reason: HOSPADM

## 2023-07-04 RX ADMIN — Medication 81 MG: at 09:02

## 2023-07-04 RX ADMIN — ARIPIPRAZOLE 30 MG: 15 TABLET ORAL at 21:45

## 2023-07-04 RX ADMIN — ENOXAPARIN SODIUM 40 MG: 40 INJECTION SUBCUTANEOUS at 09:01

## 2023-07-04 RX ADMIN — GABAPENTIN 100 MG: 100 CAPSULE ORAL at 20:10

## 2023-07-04 RX ADMIN — HYDROMORPHONE HYDROCHLORIDE 0.2 MG: 0.2 INJECTION, SOLUTION INTRAMUSCULAR; INTRAVENOUS; SUBCUTANEOUS at 01:44

## 2023-07-04 RX ADMIN — DOXAZOSIN 2 MG: 1 TABLET ORAL at 20:09

## 2023-07-04 RX ADMIN — LORAZEPAM 0.5 MG: 0.5 TABLET ORAL at 09:02

## 2023-07-04 RX ADMIN — MIRABEGRON 50 MG: 25 TABLET, FILM COATED, EXTENDED RELEASE ORAL at 17:39

## 2023-07-04 RX ADMIN — SODIUM CHLORIDE, POTASSIUM CHLORIDE, SODIUM LACTATE AND CALCIUM CHLORIDE: 600; 310; 30; 20 INJECTION, SOLUTION INTRAVENOUS at 11:38

## 2023-07-04 RX ADMIN — ACETAMINOPHEN 975 MG: 325 TABLET ORAL at 00:17

## 2023-07-04 RX ADMIN — HYDROXYZINE HYDROCHLORIDE 50 MG: 50 TABLET ORAL at 21:45

## 2023-07-04 RX ADMIN — MELOXICAM 7.5 MG: 7.5 TABLET ORAL at 17:40

## 2023-07-04 RX ADMIN — LORAZEPAM 0.5 MG: 0.5 TABLET ORAL at 20:10

## 2023-07-04 RX ADMIN — DOXEPIN 3 MG: 3 TABLET, FILM COATED ORAL at 21:45

## 2023-07-04 RX ADMIN — ACETAMINOPHEN 975 MG: 325 TABLET ORAL at 09:01

## 2023-07-04 RX ADMIN — HYDROMORPHONE HYDROCHLORIDE 0.4 MG: 0.2 INJECTION, SOLUTION INTRAMUSCULAR; INTRAVENOUS; SUBCUTANEOUS at 06:20

## 2023-07-04 RX ADMIN — MELOXICAM 7.5 MG: 7.5 TABLET ORAL at 09:02

## 2023-07-04 RX ADMIN — ACETAMINOPHEN 975 MG: 325 TABLET ORAL at 17:40

## 2023-07-04 RX ADMIN — FLUTICASONE PROPIONATE 2 SPRAY: 50 SPRAY, METERED NASAL at 09:01

## 2023-07-04 RX ADMIN — GABAPENTIN 100 MG: 100 CAPSULE ORAL at 13:38

## 2023-07-04 RX ADMIN — GABAPENTIN 100 MG: 100 CAPSULE ORAL at 09:02

## 2023-07-04 RX ADMIN — ENOXAPARIN SODIUM 40 MG: 40 INJECTION SUBCUTANEOUS at 21:46

## 2023-07-04 RX ADMIN — FLUOXETINE 60 MG: 20 CAPSULE ORAL at 09:02

## 2023-07-04 ASSESSMENT — ACTIVITIES OF DAILY LIVING (ADL)
ADLS_ACUITY_SCORE: 20
ADLS_ACUITY_SCORE: 26
ADLS_ACUITY_SCORE: 20
ADLS_ACUITY_SCORE: 26
ADLS_ACUITY_SCORE: 26
ADLS_ACUITY_SCORE: 20
ADLS_ACUITY_SCORE: 26
ADLS_ACUITY_SCORE: 20
ADLS_ACUITY_SCORE: 20

## 2023-07-04 NOTE — PROGRESS NOTES
07/04/23 1500   Appointment Info   Signing Clinician's Name / Credentials (PT) Antonina Webber, PT   Living Environment   People in Home   (group home)   Current Living Arrangements group home  (2 level.)   Home Accessibility stairs to enter home;stairs within home   Number of Stairs, Main Entrance 4   Stair Railings, Main Entrance railings on both sides of stairs   Number of Stairs, Within Home, Primary three   Stair Railings, Within Home, Primary railings on both sides of stairs   Transportation Anticipated   (family or group home staff)   Living Environment Comments has a FWW if needed   Self-Care   Usual Activity Tolerance good   Current Activity Tolerance fair   Equipment Currently Used at Home shower chair;walker, rolling;cane, straight;other (see comments)   Fall history within last six months no   Activity/Exercise/Self-Care Comment Pt walks indep without AD, indep with transfers and bed mobility.   indep with transfers and bed mobility.  Indep with laundry and does cook some.   General Information   Onset of Illness/Injury or Date of Surgery 07/03/23   Referring Physician Mariana PATRICK   Patient/Family Therapy Goals Statement (PT) none stated.   Pertinent History of Current Problem (include personal factors and/or comorbidities that impact the POC) Per the chart, This is a 58 year old male POD #1 s/p robotic extended left colectomy for colon cancer.   Existing Precautions/Restrictions fall  (bed alarm on and call light by the pt.)   Weight-Bearing Status - LLE weight-bearing as tolerated   Weight-Bearing Status - RLE weight-bearing as tolerated   Cognition   Orientation Status (Cognition) oriented to;person;place;time   Follows Commands (Cognition) follows multi-step commands   Pain Assessment   Patient Currently in Pain   (Abdominal pain)   Posture    Posture Comments Slightly flexed posture. .   Range of Motion (ROM)   ROM Comment LEs WFL.   Strength (Manual Muscle Testing)   Strength Comments Pt able  to WB bilat LEs   Bed Mobility   Comment, (Bed Mobility) Sit>supine with CGA   Transfers   Comment, (Transfers) Sit<>stand with FWW with min A x 1   Gait/Stairs (Locomotion)   [place] Level (Gait) verbal cues;contact guard   Assistive Device (Gait) walker, front-wheeled   Distance in Feet 5'   Distance in Feet (Gait) 140   Pattern (Gait) swing-through   Deviations/Abnormal Patterns (Gait) tej decreased;gait speed decreased   Balance   Balance Comments CGA with FWW.   Sensory Examination   Sensory Perception WNL   Sensory Perception Comments bilat LEs   Clinical Impression   Criteria for Skilled Therapeutic Intervention Yes, treatment indicated   PT Diagnosis (PT) impaired functional mobility.   Influenced by the following impairments weakness, dec bal, Pt is not at his PLOF.   Functional limitations due to impairments bed mobility, transfers, gait and steps.   Clinical Presentation (PT Evaluation Complexity) Stable/Uncomplicated   Clinical Presentation Rationale Pt presents medically diagnosed.   Clinical Decision Making (Complexity) low complexity   Planned Therapy Interventions (PT) bed mobility training;gait training;home exercise program;stair training;strengthening;transfer training   Anticipated Equipment Needs at Discharge (PT) walker, rolling  (FWW)   Risk & Benefits of therapy have been explained evaluation/treatment results reviewed;care plan/treatment goals reviewed;risks/benefits reviewed;current/potential barriers reviewed;patient;participants voiced agreement with care plan   PT Total Evaluation Time   PT Eval, Low Complexity Minutes (47371) 15   Plan of Care Review   Plan of Care Reviewed With patient   Physical Therapy Goals   PT Frequency Daily   PT Predicted Duration/Target Date for Goal Attainment 07/11/23   PT Goals Bed Mobility;Transfers;Gait;Stairs;PT Goal 1   PT: Bed Mobility Independent;Supine to/from sit;Rolling   PT: Transfers Modified independent;Sit to/from stand;Bed to/from  chair;Assistive device   PT: Gait Supervision/stand-by assist;Rolling walker;Greater than 200 feet   PT: Stairs Minimal assist;4 stairs;Rail on both sides   PT: Goal 1 Pt to haider bilat LE ex x 10 to 20 reps to increase strength for mobility.   Interventions   Interventions Quick Adds Gait Training;Therapeutic Activity;Therapeutic Procedure   Therapeutic Procedure/Exercise   Ther. Procedure: strength, endurance, ROM, flexibillity Minutes (67724) 8   Treatment Detail/Skilled Intervention Seated bilat LE ex x 10 reps with cues for better quality   Gait Training   Gait Training Minutes (82798) 10   Symptoms Noted During/After Treatment (Gait Training) fatigue;shortness of breath   Treatment Detail/Skilled Intervention Pt walked slowly with the FWW and he did have to stand and rest x 3 during  walking.  Pt did have increased SOB with O2 SATs 96% with .   O2 3L NC.   [place] Level (Gait Training) contact guard   Physical Assistance Level (Gait Training) verbal cues;1 person assist   Weight Bearing (Gait Training) weight-bearing as tolerated   Assistive Device (Gait Training) rolling walker  (FWW)   Pattern Analysis (Gait Training) swing-through gait   Gait Analysis Deviations decreased tej;decreased step length   Impairments (Gait Analysis/Training) balance impaired;flexibility decreased;pain;strength decreased   PT Discharge Planning   PT Plan gait w FWW, check O2 SATS, bed mobility, transfers, steps as able   PT Discharge Recommendation (DC Rec) home with home care physical therapy   PT Rationale for DC Rec Home pending progress with steps and mobility.  He does get SOB with mobility and did not haider steps yet today.  He does need assist with mobility yet.  He cannot get assist with mobility at the , than TCU is recommended.   PT Brief overview of current status PT eval, transfers w FWW  with min A x 1, gait with FWW with CGA, LE ex. steps not haider  increased SOB and fatigue.   Total Session Time   Timed  Code Treatment Minutes 18   Total Session Time (sum of timed and untimed services) 33

## 2023-07-04 NOTE — PLAN OF CARE
Problem: Plan of Care - These are the overarching goals to be used throughout the patient stay.    Goal: Optimal Comfort and Wellbeing  Outcome: Progressing   Goal Outcome Evaluation:    Pt was up in recliner for most part of day shift.  On protocols.  Labs ordered for next a.m.  Has order to remove salas catheter.  Will stop IVF and remove salas at 15:00

## 2023-07-04 NOTE — PROGRESS NOTES
RESPIRATORY CARE NOTE    RT called to room as patient had increased  work of breathing post ambulation to the restroom. Patient on 5lpm nasal canula and 94%.     Fine crackles at bases noted.     Placed on home cpap with 2L bleed in. RT touched base with RN.    RT will continue to assess and monitor cardiopulmonary status.     Caryl Hernandez, RT

## 2023-07-04 NOTE — PROGRESS NOTES
Colon and Rectal Surgery  Daily Progress Note    Subjective  Patient reports feeling pretty good.  Having flatus.  Pain controlled.  Has ambulated twice.    Objective  Intake/Output last 24 hrs:    Intake/Output Summary (Last 24 hours) at 7/4/2023 1408  Last data filed at 7/4/2023 1401  Gross per 24 hour   Intake 3758 ml   Output 3150 ml   Net 608 ml     Temp:  [97.4  F (36.3  C)-99.6  F (37.6  C)] 98  F (36.7  C)  Pulse:  [82-97] 95  Resp:  [14-22] 18  BP: (113-171)/(59-84) 128/69  FiO2 (%):  [50 %] 50 %  SpO2:  [90 %-96 %] 95 %    Physical Exam:  General: awake, alert, in no acute distress  Respiratory: non-labored breathing  Abdomen: soft, appropriately tender, non-distended              Incisions: clean, dry and intact  Musculoskeletal: moves all four extremities equally; no calf edema or tenderness  Psychological: alert and oriented, answers questions appropriately    Pertinent Labs  Lab Results: personally reviewed.  Lab Results   Component Value Date     07/04/2023     02/15/2022     09/21/2020    CO2 25 07/04/2023    CO2 24 02/15/2022    CO2 26 09/21/2020    CO2 24 01/09/2020    BUN 11.6 07/04/2023    BUN 12 02/15/2022    BUN 12 09/21/2020    BUN 10 01/09/2020     Lab Results   Component Value Date    WBC 11.5 07/04/2023    WBC 5.8 09/21/2020    HGB 12.0 07/04/2023    HGB 13.7 07/03/2023    HGB 15.1 09/21/2020    HCT 38.9 07/04/2023    HCT 44.8 09/21/2020    MCV 92 07/04/2023    MCV 91 09/21/2020     07/04/2023     09/21/2020       Assessment/Plan: This is a 58 year old male POD #1 s/p robotic extended left colectomy for colon cancer.  -Full liquids.  Please encourage Dayday to go slowly.  He took in a lot of clears since surgery and may need some redirection to not take in too much by mouth.  -Stop IV fluids  -Arguelles out  -Labs tomorrow  -Wean oxygen and ambulate  -DVT chemoprohylaxis  -Abdominal binder only needs to be worn when ambulating.  It is okay for him to take it off  while in bed.  - Path pending    John Paul Valadez MD, SEAN  [facility]  Office: [phone]  7/4/2023 2:08 PM

## 2023-07-04 NOTE — CONSULTS
Care Management Initial Consult    General Information  Assessment completed with: Patient, Family,    Type of CM/SW Visit: Initial Assessment    Primary Care Provider verified and updated as needed: Yes   Readmission within the last 30 days: planned readmission      Reason for Consult: discharge planning  Advance Care Planning:            Communication Assessment  Patient's communication style: spoken language (English or Bilingual)    Hearing Difficulty or Deaf: no   Wear Glasses or Blind: yes    Cognitive  Cognitive/Neuro/Behavioral: WDL        Orientation: oriented x 4  Mood/Behavior: calm, cooperative     Speech: clear, spontaneous    Living Environment:   People in home: facility resident     Current living Arrangements: group home      Able to return to prior arrangements: yes       Family/Social Support:  Care provided by: self, homecare agency, other (see comments) ([facility] staff)  Provides care for: no one, unable/limited ability to care for self  Marital Status: Single  Children, Parent(s), Facility resident(s)/Staff          Description of Support System: Supportive, Involved    Support Assessment: Adequate family and caregiver support    Current Resources:   Patient receiving home care services: No     Community Resources: [facility], [facility] Work[facility], Group Home  Equipment currently used at home: shower chair, walker, rolling, cane, straight, other (see comments)  Supplies currently used at home: None    Employment/Financial:  Employment Status: disabled        Financial Concerns: No concerns identified   Referral to Financial Worker: No       Does the patient's insurance plan have a 3 day qualifying hospital stay waiver?  No    Lifestyle & Psychosocial Needs:  Social Determinants of Health     Tobacco Use: Medium Risk (7/3/2023)    Patient History      Smoking Tobacco Use: Former      Smokeless Tobacco Use: Former      Passive Exposure: Not on file   Alcohol Use: Not on file   Financial Resource  Strain: Not on file   Food Insecurity: Not on file   Transportation Needs: Not on file   Physical Activity: Not on file   Stress: Not on file   Social Connections: Not on file   Intimate Partner Violence: Not on file   Depression: Not at risk (2/15/2022)    PHQ-2      PHQ-2 Score: 1   Housing Stability: Not on file       Functional Status:  Prior to admission patient needed assistance:   Dependent ADLs:: Independent  Dependent IADLs:: Shopping, Meal Preparation, Medication Management, Money Management, Transportation, Laundry, Cooking, Cleaning       Mental Health Status:  Mental Health Status: No Current Concerns  Mental Health Management: Medication, Psychiatrist    Chemical Dependency Status:  Chemical Dependency Status: No Current Concerns             Values/Beliefs:  Spiritual, Cultural Beliefs, Zoroastrianism Practices, Values that affect care: no               Additional Information:  11:05 AM  ALEXANDRO met and introduced self and CM services to Pt. Pt reported questions appropriately and stated he will try his best to answer SW's questions. Pt has lived in a group home in [facility] for the past eight years. Pt reports he is independent with mobility but uses a cane or walker as needed. Pt reports that he has a home CPAP machine that he wears at night. Pt stated he had PT/OT with [facility]. Pt stated that his mother Chrissy is his guardian. SW stated that CM will reach out to his mother to request copies of the guardianship paperwork in order for it to added to Pt's EMR. Pt reported CM can call Chrissy but Pt added that his son Dion would prefer to be the main contact as Dion is a doctor. SW reported CM will follow up with Dion and will work with his group home to coordinate his return home when medically ready. Pt reported understanding and denied having any questions for ALEXANDRO.     3:26 PM  ALEXANDRO called [facility] to inquire about Pt's services. ALEXANDRO was informed that Pt was discharged from their home PT/OT/ALEXANDRO  services on 3/2/23.    ALEXANDRO attempted to call Pt's son Dion to confirm and complete assessment information. Dion reported that he was currently at the [employer] game with Pt's mother Chrissy. ALEXANDRO was able to request Dion to bring in the Guardianship paperwork prior to agreeing to connect with one another tomorrow. CM to follow up with Dion and the  tomorrow.     RICHIE Garcia

## 2023-07-04 NOTE — PLAN OF CARE
Problem: Surgery Nonspecified  Goal: Optimal Pain Control and Function  Outcome: Progressing    Pt rating pain 4-6/10. Pain managed with PRN dilaudid and scheduled acetaminophen. Colored face scale used to accurately assess pain, per family. Pt denied nausea, tolerating clears. Pt ambulated in hallways x2 overnight. Pt slept between cares. Arguelles patent and draining. Pt remains on 4L of O2, wore CPAP overnight. VSS.    Ashley Costa RN

## 2023-07-05 ENCOUNTER — DOCUMENTATION ONLY (OUTPATIENT)
Dept: OTHER | Facility: CLINIC | Age: 58
End: 2023-07-05
Payer: COMMERCIAL

## 2023-07-05 ENCOUNTER — APPOINTMENT (OUTPATIENT)
Dept: OCCUPATIONAL THERAPY | Facility: HOSPITAL | Age: 58
DRG: 330 | End: 2023-07-05
Attending: COLON & RECTAL SURGERY
Payer: COMMERCIAL

## 2023-07-05 ENCOUNTER — APPOINTMENT (OUTPATIENT)
Dept: PHYSICAL THERAPY | Facility: HOSPITAL | Age: 58
DRG: 330 | End: 2023-07-05
Attending: COLON & RECTAL SURGERY
Payer: COMMERCIAL

## 2023-07-05 LAB
ANION GAP SERPL CALCULATED.3IONS-SCNC: 11 MMOL/L (ref 7–15)
BUN SERPL-MCNC: 9.5 MG/DL (ref 8–23)
CALCIUM SERPL-MCNC: 9 MG/DL (ref 8.6–10)
CHLORIDE SERPL-SCNC: 101 MMOL/L (ref 98–107)
CREAT SERPL-MCNC: 0.86 MG/DL (ref 0.67–1.17)
DEPRECATED HCO3 PLAS-SCNC: 26 MMOL/L (ref 22–29)
ERYTHROCYTE [DISTWIDTH] IN BLOOD BY AUTOMATED COUNT: 13.9 % (ref 10–15)
GFR SERPL CREATININE-BSD FRML MDRD: >90 ML/MIN/1.73M2
GLUCOSE BLDC GLUCOMTR-MCNC: 129 MG/DL (ref 70–99)
GLUCOSE SERPL-MCNC: 140 MG/DL (ref 70–99)
HCT VFR BLD AUTO: 43.7 % (ref 40–53)
HGB BLD-MCNC: 14 G/DL (ref 13.3–17.7)
MAGNESIUM SERPL-MCNC: 2.2 MG/DL (ref 1.7–2.3)
MCH RBC QN AUTO: 28.6 PG (ref 26.5–33)
MCHC RBC AUTO-ENTMCNC: 32 G/DL (ref 31.5–36.5)
MCV RBC AUTO: 89 FL (ref 78–100)
PLATELET # BLD AUTO: 196 10E3/UL (ref 150–450)
POTASSIUM SERPL-SCNC: 4.1 MMOL/L (ref 3.4–5.3)
RBC # BLD AUTO: 4.89 10E6/UL (ref 4.4–5.9)
SODIUM SERPL-SCNC: 138 MMOL/L (ref 136–145)
WBC # BLD AUTO: 13.8 10E3/UL (ref 4–11)

## 2023-07-05 PROCEDURE — 999N000157 HC STATISTIC RCP TIME EA 10 MIN

## 2023-07-05 PROCEDURE — 250N000013 HC RX MED GY IP 250 OP 250 PS 637

## 2023-07-05 PROCEDURE — 97535 SELF CARE MNGMENT TRAINING: CPT | Mod: GO

## 2023-07-05 PROCEDURE — 97530 THERAPEUTIC ACTIVITIES: CPT | Mod: GP

## 2023-07-05 PROCEDURE — 250N000011 HC RX IP 250 OP 636: Performed by: COLON & RECTAL SURGERY

## 2023-07-05 PROCEDURE — 80048 BASIC METABOLIC PNL TOTAL CA: CPT | Performed by: COLON & RECTAL SURGERY

## 2023-07-05 PROCEDURE — 85027 COMPLETE CBC AUTOMATED: CPT

## 2023-07-05 PROCEDURE — 120N000001 HC R&B MED SURG/OB

## 2023-07-05 PROCEDURE — 36415 COLL VENOUS BLD VENIPUNCTURE: CPT

## 2023-07-05 PROCEDURE — 97116 GAIT TRAINING THERAPY: CPT | Mod: GP

## 2023-07-05 PROCEDURE — 83735 ASSAY OF MAGNESIUM: CPT | Performed by: COLON & RECTAL SURGERY

## 2023-07-05 PROCEDURE — 250N000011 HC RX IP 250 OP 636: Mod: JZ

## 2023-07-05 RX ORDER — DEXTROSE MONOHYDRATE, SODIUM CHLORIDE, AND POTASSIUM CHLORIDE 50; 2.98; 4.5 G/1000ML; G/1000ML; G/1000ML
INJECTION, SOLUTION INTRAVENOUS CONTINUOUS
Status: DISCONTINUED | OUTPATIENT
Start: 2023-07-05 | End: 2023-07-05

## 2023-07-05 RX ADMIN — MELOXICAM 7.5 MG: 7.5 TABLET ORAL at 10:14

## 2023-07-05 RX ADMIN — ENOXAPARIN SODIUM 40 MG: 40 INJECTION SUBCUTANEOUS at 21:58

## 2023-07-05 RX ADMIN — ACETAMINOPHEN 975 MG: 325 TABLET ORAL at 10:09

## 2023-07-05 RX ADMIN — FLUTICASONE PROPIONATE 2 SPRAY: 50 SPRAY, METERED NASAL at 10:20

## 2023-07-05 RX ADMIN — GABAPENTIN 100 MG: 100 CAPSULE ORAL at 10:12

## 2023-07-05 RX ADMIN — MELOXICAM 7.5 MG: 7.5 TABLET ORAL at 18:05

## 2023-07-05 RX ADMIN — LORAZEPAM 0.5 MG: 0.5 TABLET ORAL at 10:08

## 2023-07-05 RX ADMIN — MIRABEGRON 50 MG: 25 TABLET, FILM COATED, EXTENDED RELEASE ORAL at 18:04

## 2023-07-05 RX ADMIN — ACETAMINOPHEN 975 MG: 325 TABLET ORAL at 00:05

## 2023-07-05 RX ADMIN — Medication 81 MG: at 10:08

## 2023-07-05 RX ADMIN — FLUOXETINE 60 MG: 20 CAPSULE ORAL at 10:11

## 2023-07-05 RX ADMIN — ONDANSETRON 4 MG: 2 INJECTION INTRAMUSCULAR; INTRAVENOUS at 13:36

## 2023-07-05 RX ADMIN — POTASSIUM CHLORIDE: 2 INJECTION, SOLUTION, CONCENTRATE INTRAVENOUS at 20:01

## 2023-07-05 RX ADMIN — ACETAMINOPHEN 975 MG: 325 TABLET ORAL at 18:05

## 2023-07-05 RX ADMIN — ENOXAPARIN SODIUM 40 MG: 40 INJECTION SUBCUTANEOUS at 10:21

## 2023-07-05 ASSESSMENT — ACTIVITIES OF DAILY LIVING (ADL)
ADLS_ACUITY_SCORE: 26
ADLS_ACUITY_SCORE: 23
ADLS_ACUITY_SCORE: 26
ADLS_ACUITY_SCORE: 22
ADLS_ACUITY_SCORE: 26

## 2023-07-05 NOTE — PROGRESS NOTES
Colon and Rectal Surgery  Daily Progress Note    Subjective  Reports nausea and bloating this AM after taking in full liquids. Passed some flatus and 1x BM yesterday, but minimal flatus today. Pain controlled. Down to 1L NC, RT consulted and CXR otherwise negative from yesterday.     Objective  Intake/Output last 24 hrs:    Intake/Output Summary (Last 24 hours) at 7/5/2023 0847  Last data filed at 7/5/2023 0800  Gross per 24 hour   Intake 900 ml   Output 2425 ml   Net -1525 ml     Temp:  [97.6  F (36.4  C)-99.2  F (37.3  C)] 98.4  F (36.9  C)  Pulse:  [] 98  Resp:  [19-22] 20  BP: (130-154)/(63-95) 143/69  SpO2:  [95 %-97 %] 97 %    Physical Exam:  General: awake, alert, laying in bed, in no acute distress  Head: normocephalic, atraumatic  Respiratory: non-labored breathing  Abdomen: soft, appropriately tender, mild to moderately distended              Incisions: clean, dry and intact  Skin: No rashes or lesions  Musculoskeletal: moves all four extremities equally  Psychological: alert and oriented, answers questions appropriately    Pertinent Labs  Lab Results: personally reviewed.  Lab Results   Component Value Date     07/05/2023     07/04/2023     02/15/2022    CO2 26 07/05/2023    CO2 25 07/04/2023    CO2 24 02/15/2022    CO2 26 09/21/2020    CO2 24 01/09/2020    BUN 9.5 07/05/2023    BUN 11.6 07/04/2023    BUN 12 02/15/2022    BUN 12 09/21/2020    BUN 10 01/09/2020     Lab Results   Component Value Date    WBC 13.8 07/05/2023    WBC 11.5 07/04/2023    WBC 5.8 09/21/2020    HGB 14.0 07/05/2023    HGB 12.0 07/04/2023    HGB 13.7 07/03/2023    HCT 43.7 07/05/2023    HCT 38.9 07/04/2023    HCT 44.8 09/21/2020    MCV 89 07/05/2023    MCV 92 07/04/2023    MCV 91 09/21/2020     07/05/2023     07/04/2023     09/21/2020       Assessment/Plan: This is a 58 year old male POD #2 s/p robotic extended left colectomy for colon cancer.    WBC 13.8 (11.5)  Hgb 14 (12)  Cr  0.86    -Full liquids for now, encourage pt to go slowly with nausea  -Wean oxygen as able, appreciate RT assistance  -Lovenox dvt ppx  -OOB/ambulate  -Encourage IS  -Abdominal binder with ambulation  -Monitor I&Os  -Pathology pending    Will discuss with Dr. Valadez and addend with updates    Cynthia Lloyd PA-C  [facility]  [phone]..............................main    Colorectal Surgery Staff:  I have seen and examined the patient. I agree with the above documentation and plan of the fellow/PA above with the following additions/chages:    Dayday's is still feeling sick. He is not having any more emesis. He is passing gas and having BMs. He is not having too much pain. Has been OOB.    Afebrile, mild tachycardia, oxygen weaned down to 1L.  Abdomen is soft but quite distended. Incisions c/d/i.    Labs reviewed, WBC increased slightly, but so has Hb indicating he is fluid contracted compared to yesterday.    A/P: 58M POD 2 s/p robotic assisted extended left colectomy for colon cancer.    - Since he was initially starting to pass and and have BMs, Dayday was made NPO instead of having an NG tube placed. We were attempting to get him through this without an NG. However, based on his exam, he is quite distended and his anastomosis sits right over his small bowel so we will place an NG tube to decompress him and to avoid stretch on the anastomosis.   - IVFs resumed  - Labs tomorrow  - DVT chemoprophylaxis   - Path pending    John Paul Valadez MD MBA  [facility]  Office: [phone]  7/5/2023 6:54 PM

## 2023-07-05 NOTE — PLAN OF CARE
Problem: Plan of Care - These are the overarching goals to be used throughout the patient stay.    Goal: Plan of Care Review  Description: The Plan of Care Review/Shift note should be completed every shift.  The Outcome Evaluation is a brief statement about your assessment that the patient is improving, declining, or no change.  This information will be displayed automatically on your shift note.  Outcome: Progressing  Flowsheets (Taken 7/5/2023 0738)  Plan of Care Reviewed With: patient  Overall Patient Progress: improving   Alert and oriented. POD 2 left colectomy for colon cancer.  On full liquid diet, tolerating well. Had bowel movement this shift, Arguelles was taken out yesterday, patient is voiding adequately in the urinal. Pain controlled with scheduled tylenol.  Patient ambulated the hallway x 1 this shift, tolerated walk well. no complain of shortness of breath. On 2 liters oxygen was bled into CPAP. Continuous pulse oximeter maintaining oxygen saturation in the mid 90's.  Stand by assist with the walker. PIV saline lock. Potassium and magnesium protocol, recheck this morning.  Blood glucose this morning 129 mg/dl, POD 2.

## 2023-07-05 NOTE — PLAN OF CARE
Goal Outcome Evaluation:  Reports abdominal pain 3/10, declines pain medication aside from scheduled tylenol. Patient states nausea is more uncomfortable than the pain.  Abdomen round, distended. Absent BS. Reports fullness, bloating. PA, Dulcey, with CRS aware of emesis over night and 2 more today. Patient backed down to NPO except sips with meds. Reached back out to inquire about need for IVF, no new orders at this time. Used baylee, sea bands and zofran for nausea.  K and MG protocols are rechecks in am.  Encourage IS use-only able to get to 500. Lungs diminished. Provide reminders for more frequent use.  Lap sites with dermabond, some bruising noted but otherwise intact, no drainage, YESENIA.  Patient up in the chair most of the day and ambulated in the halls x2.

## 2023-07-05 NOTE — PLAN OF CARE
Goal Outcome Evaluation:    1740... Pt was assisted to the bathroom and he was having difficulty breathing with expiratory wheezes. Writer assisted pt back to bed and respiratory assessment done, fine crackles noted especially in the base of his lungs. Colorectal (Dr. Gamboa) and RT were notified. Rt came and assessed and put pt on CPAP. Dr. Gamboa ordered chest XR and PRN albuterol neb. Close monitoring continues.            1827... Pt had chest x-ray done and it was negative for pleural effusion, infiltrate and pneumothorax. Arguelles cath was discontinued on the day shift and he has been voiding freely. Pt is resting quietly in bed, CPAP is on. He has 4 lap sites and a small incision, sites are CDI. Tolerated full liquid diet, will continue to monitor.

## 2023-07-06 ENCOUNTER — APPOINTMENT (OUTPATIENT)
Dept: OCCUPATIONAL THERAPY | Facility: HOSPITAL | Age: 58
DRG: 330 | End: 2023-07-06
Attending: COLON & RECTAL SURGERY
Payer: COMMERCIAL

## 2023-07-06 ENCOUNTER — APPOINTMENT (OUTPATIENT)
Dept: PHYSICAL THERAPY | Facility: HOSPITAL | Age: 58
DRG: 330 | End: 2023-07-06
Attending: COLON & RECTAL SURGERY
Payer: COMMERCIAL

## 2023-07-06 LAB
ANION GAP SERPL CALCULATED.3IONS-SCNC: 9 MMOL/L (ref 7–15)
BUN SERPL-MCNC: 14.1 MG/DL (ref 8–23)
CALCIUM SERPL-MCNC: 8.8 MG/DL (ref 8.6–10)
CHLORIDE SERPL-SCNC: 100 MMOL/L (ref 98–107)
CREAT SERPL-MCNC: 0.89 MG/DL (ref 0.67–1.17)
DEPRECATED HCO3 PLAS-SCNC: 29 MMOL/L (ref 22–29)
ERYTHROCYTE [DISTWIDTH] IN BLOOD BY AUTOMATED COUNT: 14.1 % (ref 10–15)
GFR SERPL CREATININE-BSD FRML MDRD: >90 ML/MIN/1.73M2
GLUCOSE SERPL-MCNC: 113 MG/DL (ref 70–99)
HCT VFR BLD AUTO: 42.7 % (ref 40–53)
HGB BLD-MCNC: 13.7 G/DL (ref 13.3–17.7)
MAGNESIUM SERPL-MCNC: 2.2 MG/DL (ref 1.7–2.3)
MCH RBC QN AUTO: 28.8 PG (ref 26.5–33)
MCHC RBC AUTO-ENTMCNC: 32.1 G/DL (ref 31.5–36.5)
MCV RBC AUTO: 90 FL (ref 78–100)
PLATELET # BLD AUTO: 197 10E3/UL (ref 150–450)
POTASSIUM SERPL-SCNC: 3.8 MMOL/L (ref 3.4–5.3)
RBC # BLD AUTO: 4.76 10E6/UL (ref 4.4–5.9)
SODIUM SERPL-SCNC: 138 MMOL/L (ref 136–145)
WBC # BLD AUTO: 14 10E3/UL (ref 4–11)

## 2023-07-06 PROCEDURE — 250N000013 HC RX MED GY IP 250 OP 250 PS 637

## 2023-07-06 PROCEDURE — 85014 HEMATOCRIT: CPT | Performed by: COLON & RECTAL SURGERY

## 2023-07-06 PROCEDURE — 83735 ASSAY OF MAGNESIUM: CPT | Performed by: COLON & RECTAL SURGERY

## 2023-07-06 PROCEDURE — 97116 GAIT TRAINING THERAPY: CPT | Mod: GP

## 2023-07-06 PROCEDURE — 80048 BASIC METABOLIC PNL TOTAL CA: CPT | Performed by: COLON & RECTAL SURGERY

## 2023-07-06 PROCEDURE — 250N000011 HC RX IP 250 OP 636: Performed by: COLON & RECTAL SURGERY

## 2023-07-06 PROCEDURE — 250N000011 HC RX IP 250 OP 636: Mod: JZ

## 2023-07-06 PROCEDURE — 97535 SELF CARE MNGMENT TRAINING: CPT | Mod: GO

## 2023-07-06 PROCEDURE — 36415 COLL VENOUS BLD VENIPUNCTURE: CPT | Performed by: COLON & RECTAL SURGERY

## 2023-07-06 PROCEDURE — 258N000003 HC RX IP 258 OP 636: Performed by: COLON & RECTAL SURGERY

## 2023-07-06 PROCEDURE — 120N000001 HC R&B MED SURG/OB

## 2023-07-06 RX ORDER — ENOXAPARIN SODIUM 100 MG/ML
40 INJECTION SUBCUTANEOUS EVERY 24 HOURS
Status: DISCONTINUED | OUTPATIENT
Start: 2023-07-07 | End: 2023-07-11

## 2023-07-06 RX ADMIN — POTASSIUM CHLORIDE: 2 INJECTION, SOLUTION, CONCENTRATE INTRAVENOUS at 16:10

## 2023-07-06 RX ADMIN — DOXEPIN 3 MG: 3 TABLET, FILM COATED ORAL at 21:33

## 2023-07-06 RX ADMIN — ARIPIPRAZOLE 30 MG: 15 TABLET ORAL at 21:32

## 2023-07-06 RX ADMIN — MELOXICAM 7.5 MG: 7.5 TABLET ORAL at 18:11

## 2023-07-06 RX ADMIN — DOXAZOSIN 2 MG: 1 TABLET ORAL at 21:32

## 2023-07-06 RX ADMIN — GABAPENTIN 100 MG: 100 CAPSULE ORAL at 21:32

## 2023-07-06 RX ADMIN — FLUTICASONE PROPIONATE 2 SPRAY: 50 SPRAY, METERED NASAL at 09:16

## 2023-07-06 RX ADMIN — POTASSIUM CHLORIDE: 2 INJECTION, SOLUTION, CONCENTRATE INTRAVENOUS at 04:03

## 2023-07-06 RX ADMIN — ACETAMINOPHEN 975 MG: 325 TABLET ORAL at 09:17

## 2023-07-06 RX ADMIN — GABAPENTIN 100 MG: 100 CAPSULE ORAL at 13:26

## 2023-07-06 RX ADMIN — Medication 81 MG: at 09:17

## 2023-07-06 RX ADMIN — ENOXAPARIN SODIUM 40 MG: 40 INJECTION SUBCUTANEOUS at 09:16

## 2023-07-06 RX ADMIN — MELOXICAM 7.5 MG: 7.5 TABLET ORAL at 09:17

## 2023-07-06 RX ADMIN — MIRABEGRON 50 MG: 25 TABLET, FILM COATED, EXTENDED RELEASE ORAL at 18:11

## 2023-07-06 RX ADMIN — GABAPENTIN 100 MG: 100 CAPSULE ORAL at 09:17

## 2023-07-06 RX ADMIN — LORAZEPAM 0.5 MG: 0.5 TABLET ORAL at 21:32

## 2023-07-06 RX ADMIN — SODIUM CHLORIDE, POTASSIUM CHLORIDE, SODIUM LACTATE AND CALCIUM CHLORIDE 1000 ML: 600; 310; 30; 20 INJECTION, SOLUTION INTRAVENOUS at 09:16

## 2023-07-06 RX ADMIN — LORAZEPAM 0.5 MG: 0.5 TABLET ORAL at 09:17

## 2023-07-06 RX ADMIN — HYDROXYZINE HYDROCHLORIDE 50 MG: 50 TABLET ORAL at 21:32

## 2023-07-06 RX ADMIN — FLUOXETINE 60 MG: 20 CAPSULE ORAL at 09:17

## 2023-07-06 ASSESSMENT — ACTIVITIES OF DAILY LIVING (ADL)
ADLS_ACUITY_SCORE: 22

## 2023-07-06 NOTE — PLAN OF CARE
Problem: Surgery Nonspecified  Goal: Effective Bowel Elimination  Outcome: Progressing     Problem: Surgery Nonspecified  Goal: Effective Urinary Elimination  Outcome: Progressing     Problem: Surgery Nonspecified  Goal: Effective Oxygenation and Ventilation  Intervention: Optimize Oxygenation and Ventilation  Recent Flowsheet Documentation  Taken 7/6/2023 0012 by Galileo Barrett, RN  Head of Bed (HOB) Positioning: HOB at 20-30 degrees     Goal Outcome Evaluation: Pt denies any pain. Voided 200ml. No BM on shift. NG tube in place to LIS with green output of 350 ml. No N/V. Ambulate [place] x1. Can make needs be known.

## 2023-07-06 NOTE — PROGRESS NOTES
Pt has NG in place and unable  To wear home cpap at this time.  Will support pt with 2 lpm NC overnight.  Rt will continue to monitor.

## 2023-07-06 NOTE — PROGRESS NOTES
Colon and Rectal Surgery  Daily Progress Note    Subjective  NGT inserted yesterday due to ongoing nausea/emesis. Since insertion reports feeling better. Denies nausea or distension. Passing gas and small stools. NGT with ~3.5L output since insertion. Ambulating unit this AM.     Objective  Intake/Output last 24 hrs:    Intake/Output Summary (Last 24 hours) at 7/4/2023 1408  Last data filed at 7/4/2023 1401  Gross per 24 hour   Intake 3758 ml   Output 3150 ml   Net 608 ml     Temp:  [98.1  F (36.7  C)-98.9  F (37.2  C)] 98.9  F (37.2  C)  Pulse:  [] 98  Resp:  [20] 20  BP: (102-174)/(59-94) 131/80  SpO2:  [91 %-94 %] 92 %    Physical Exam:  General: awake, alert, in no acute distress  Respiratory: non-labored breathing  Abdomen: soft, appropriately tender, distended             Incisions: clean, dry and intact  Musculoskeletal: moves all four extremities equally; no calf edema or tenderness  Psychological: alert and oriented, answers questions appropriately    Pertinent Labs  Lab Results: personally reviewed.  Lab Results   Component Value Date     07/04/2023     02/15/2022     09/21/2020    CO2 25 07/04/2023    CO2 24 02/15/2022    CO2 26 09/21/2020    CO2 24 01/09/2020    BUN 11.6 07/04/2023    BUN 12 02/15/2022    BUN 12 09/21/2020    BUN 10 01/09/2020     Lab Results   Component Value Date    WBC 11.5 07/04/2023    WBC 5.8 09/21/2020    HGB 12.0 07/04/2023    HGB 13.7 07/03/2023    HGB 15.1 09/21/2020    HCT 38.9 07/04/2023    HCT 44.8 09/21/2020    MCV 92 07/04/2023    MCV 91 09/21/2020     07/04/2023     09/21/2020       Assessment/Plan: This is a 58 year old male POD #3 s/p robotic extended left colectomy for colon cancer.    WBC 14 (13.8)  Hgb 13.7 (14)  Cr 0.89  Remains on 2L NC    -NPO/NGT  -1L bolus this AM with high NGT output  -mIVF with D51/2 + 40K  -Wean oxygen, appreciate RT assistance  -Lovenox dvt ppx  -Abdominal binder with ambulation  -Monitor  I&Os  -Encourage IS  -Path pending  -Recheck labs tomorrow    Discussed with Dr. Rory Lloyd PA-C  [facility]  Office: [phone]    Colorectal Surgery Staff:  I have seen and examined the patient. I agree with the above documentation and plan of the fellow/PA above with the following additions/chages:    Feeling much better now. States he has no abdominal pain. Passing gas. Last liquid BM was yesterday. Has not walked much. Weaned from oxygen today.     Afebrile, mild tachycardia, no hypotension.  Ins/Outs as above, NG put out 3.5L, output from first shift today not recorded but from discussion with nurse 900mL.  Abdomen soft, much less distended, incisions c/d/i    Labs as above    A/P: 58M POD 2 s/p lap extended left colectomy, with ileus.  - Continue NG to low intermittent suction  - Ambulate and up in chair as much as possible  - Labs tomorrow  - DVT chemoprophylaxis  - Continue current care plan  - Will watch ins/outs closely and manage fluids as appropriate, please chart all ins and outs.     John Paul Valadez MD MBA  [facility]  Office: [phone]  7/6/2023 2:23 PM

## 2023-07-06 NOTE — PLAN OF CARE
Goal Outcome Evaluation:       1949... Pt was seen by Dr. Valadez and he ordered NG tube placement for decompression and continuous IV fluid. Writer assisted the swat nurse and the NG tube was inserted, 2800 mls of dark greenish fluid drained out, tube is to LIS. Prior to tube placement, pt had small emesis of 10 mls of greenish fluid. IV fluid D5+1/2 NS +40 mEq of KCL is running at 125 ml/hr. Will continue to monitor.      2000-2300....NG tube remains intact and draining, 400 mls  of thick dark greenish fluid noted at the end of the shift. Pt mother was updated, he's resting comfortably in bed at this time, will continue to monitor. Voided 225 mls

## 2023-07-06 NOTE — PLAN OF CARE
Problem: Surgery Nonspecified  Goal: Nausea and Vomiting Relief  Outcome: Progressing  Intervention: Prevent or Manage Nausea and Vomiting  Recent Flowsheet Documentation  Taken 7/6/2023 1131 by Faiza Heck, RN  Nausea/Vomiting Interventions:   acupressure applied   nasogastric tube to suction   Goal Outcome Evaluation:       NG tube to LIS, 900 ml out put this shift.  Fluids replaced with 1000ml LR bolus.  Pt up in chair most of this shift.  Tolerating room air, sats in mid 90's.  Ambulated x3 this shift.    Faiza Heck, RN

## 2023-07-07 ENCOUNTER — APPOINTMENT (OUTPATIENT)
Dept: PHYSICAL THERAPY | Facility: HOSPITAL | Age: 58
DRG: 330 | End: 2023-07-07
Attending: COLON & RECTAL SURGERY
Payer: COMMERCIAL

## 2023-07-07 ENCOUNTER — APPOINTMENT (OUTPATIENT)
Dept: OCCUPATIONAL THERAPY | Facility: HOSPITAL | Age: 58
DRG: 330 | End: 2023-07-07
Attending: COLON & RECTAL SURGERY
Payer: COMMERCIAL

## 2023-07-07 LAB
ANION GAP SERPL CALCULATED.3IONS-SCNC: 9 MMOL/L (ref 7–15)
BUN SERPL-MCNC: 14.3 MG/DL (ref 8–23)
CALCIUM SERPL-MCNC: 9.4 MG/DL (ref 8.6–10)
CHLORIDE SERPL-SCNC: 102 MMOL/L (ref 98–107)
CREAT SERPL-MCNC: 0.97 MG/DL (ref 0.67–1.17)
DEPRECATED HCO3 PLAS-SCNC: 30 MMOL/L (ref 22–29)
ERYTHROCYTE [DISTWIDTH] IN BLOOD BY AUTOMATED COUNT: 14 % (ref 10–15)
GFR SERPL CREATININE-BSD FRML MDRD: 90 ML/MIN/1.73M2
GLUCOSE SERPL-MCNC: 117 MG/DL (ref 70–99)
HCT VFR BLD AUTO: 41.3 % (ref 40–53)
HGB BLD-MCNC: 12.7 G/DL (ref 13.3–17.7)
MAGNESIUM SERPL-MCNC: 2 MG/DL (ref 1.7–2.3)
MCH RBC QN AUTO: 28.5 PG (ref 26.5–33)
MCHC RBC AUTO-ENTMCNC: 30.8 G/DL (ref 31.5–36.5)
MCV RBC AUTO: 93 FL (ref 78–100)
PATH REPORT.COMMENTS IMP SPEC: ABNORMAL
PATH REPORT.COMMENTS IMP SPEC: YES
PATH REPORT.FINAL DX SPEC: ABNORMAL
PATH REPORT.GROSS SPEC: ABNORMAL
PATH REPORT.MICROSCOPIC SPEC OTHER STN: ABNORMAL
PATH REPORT.RELEVANT HX SPEC: ABNORMAL
PHOTO IMAGE: ABNORMAL
PLATELET # BLD AUTO: 205 10E3/UL (ref 150–450)
POTASSIUM SERPL-SCNC: 4.3 MMOL/L (ref 3.4–5.3)
RBC # BLD AUTO: 4.46 10E6/UL (ref 4.4–5.9)
SODIUM SERPL-SCNC: 141 MMOL/L (ref 136–145)
WBC # BLD AUTO: 12.4 10E3/UL (ref 4–11)

## 2023-07-07 PROCEDURE — 250N000013 HC RX MED GY IP 250 OP 250 PS 637

## 2023-07-07 PROCEDURE — 97116 GAIT TRAINING THERAPY: CPT | Mod: GP

## 2023-07-07 PROCEDURE — 85027 COMPLETE CBC AUTOMATED: CPT | Performed by: COLON & RECTAL SURGERY

## 2023-07-07 PROCEDURE — 83735 ASSAY OF MAGNESIUM: CPT | Performed by: COLON & RECTAL SURGERY

## 2023-07-07 PROCEDURE — 36415 COLL VENOUS BLD VENIPUNCTURE: CPT | Performed by: COLON & RECTAL SURGERY

## 2023-07-07 PROCEDURE — 258N000003 HC RX IP 258 OP 636

## 2023-07-07 PROCEDURE — 97110 THERAPEUTIC EXERCISES: CPT | Mod: GO

## 2023-07-07 PROCEDURE — 97535 SELF CARE MNGMENT TRAINING: CPT | Mod: GO

## 2023-07-07 PROCEDURE — 250N000011 HC RX IP 250 OP 636: Mod: JZ | Performed by: COLON & RECTAL SURGERY

## 2023-07-07 PROCEDURE — 97530 THERAPEUTIC ACTIVITIES: CPT | Mod: GP

## 2023-07-07 PROCEDURE — 120N000001 HC R&B MED SURG/OB

## 2023-07-07 PROCEDURE — 80048 BASIC METABOLIC PNL TOTAL CA: CPT | Performed by: COLON & RECTAL SURGERY

## 2023-07-07 RX ADMIN — LORAZEPAM 0.5 MG: 0.5 TABLET ORAL at 09:54

## 2023-07-07 RX ADMIN — POTASSIUM CHLORIDE: 2 INJECTION, SOLUTION, CONCENTRATE INTRAVENOUS at 11:00

## 2023-07-07 RX ADMIN — MELOXICAM 7.5 MG: 7.5 TABLET ORAL at 12:55

## 2023-07-07 RX ADMIN — FLUTICASONE PROPIONATE 2 SPRAY: 50 SPRAY, METERED NASAL at 12:55

## 2023-07-07 RX ADMIN — DOXAZOSIN 2 MG: 1 TABLET ORAL at 20:50

## 2023-07-07 RX ADMIN — Medication 81 MG: at 09:54

## 2023-07-07 RX ADMIN — GABAPENTIN 100 MG: 100 CAPSULE ORAL at 15:08

## 2023-07-07 RX ADMIN — ENOXAPARIN SODIUM 40 MG: 40 INJECTION SUBCUTANEOUS at 09:53

## 2023-07-07 RX ADMIN — MELOXICAM 7.5 MG: 7.5 TABLET ORAL at 17:53

## 2023-07-07 RX ADMIN — SODIUM CHLORIDE, POTASSIUM CHLORIDE, SODIUM LACTATE AND CALCIUM CHLORIDE 500 ML: 600; 310; 30; 20 INJECTION, SOLUTION INTRAVENOUS at 09:51

## 2023-07-07 RX ADMIN — MIRABEGRON 50 MG: 25 TABLET, FILM COATED, EXTENDED RELEASE ORAL at 17:50

## 2023-07-07 RX ADMIN — LORAZEPAM 0.5 MG: 0.5 TABLET ORAL at 20:50

## 2023-07-07 RX ADMIN — GABAPENTIN 100 MG: 100 CAPSULE ORAL at 09:54

## 2023-07-07 RX ADMIN — HYDROXYZINE HYDROCHLORIDE 50 MG: 50 TABLET ORAL at 22:13

## 2023-07-07 RX ADMIN — GABAPENTIN 100 MG: 100 CAPSULE ORAL at 20:50

## 2023-07-07 RX ADMIN — ARIPIPRAZOLE 30 MG: 15 TABLET ORAL at 22:12

## 2023-07-07 RX ADMIN — DOXEPIN 3 MG: 3 TABLET, FILM COATED ORAL at 22:13

## 2023-07-07 RX ADMIN — FLUOXETINE 60 MG: 20 CAPSULE ORAL at 09:54

## 2023-07-07 RX ADMIN — POTASSIUM CHLORIDE: 2 INJECTION, SOLUTION, CONCENTRATE INTRAVENOUS at 19:10

## 2023-07-07 ASSESSMENT — ACTIVITIES OF DAILY LIVING (ADL)
ADLS_ACUITY_SCORE: 26
ADLS_ACUITY_SCORE: 23
ADLS_ACUITY_SCORE: 26
ADLS_ACUITY_SCORE: 22
ADLS_ACUITY_SCORE: 23
ADLS_ACUITY_SCORE: 22
ADLS_ACUITY_SCORE: 26
ADLS_ACUITY_SCORE: 22
ADLS_ACUITY_SCORE: 26
ADLS_ACUITY_SCORE: 26

## 2023-07-07 NOTE — PLAN OF CARE
Goal Outcome Evaluation:         Alert and oriented. Up with standby assist and walking with therapy. NG in place LIS. Green thick drainage. NG for one more day according to surgery. Denies nausea. Denies pain. Some sign of drainage from incisions, but currently closed and dry. Took pills with water and NG clamped. Still denies nausea when clamped. 500ml LR bolus initiated. Pt. Reports very hungry. Up walking clamped NG with therapy. Pt. Wetted a brief this morning after being handed a urinal. Brief was very wet, but this was the only urinary output observed.

## 2023-07-07 NOTE — PROGRESS NOTES
Colon and Rectal Surgery  Daily Progress Note    Subjective  NGT with 2.3L out. Patient reports no nausea/vomiting, bloating. Passing gas, no stool today. Ambulating unit, up in chair this morning. Denies abdominal pain. Urine output 700cc with 2 unmeasured.     Objective  Intake/Output last 24 hrs:    Intake/Output Summary (Last 24 hours) at 7/4/2023 1408  Last data filed at 7/4/2023 1401  Gross per 24 hour   Intake 3758 ml   Output 3150 ml   Net 608 ml     Temp:  [98.4  F (36.9  C)-98.6  F (37  C)] 98.4  F (36.9  C)  Pulse:  [] 102  Resp:  [18] 18  BP: (117-122)/(63-67) 120/67  SpO2:  [87 %-95 %] 95 %    Physical Exam:  General: awake, alert, sitting in chair, in no acute distress. NGT with dark bilious output  Respiratory: non-labored breathing  Abdomen: soft, appropriately tender, distended             Incisions: clean, dry and intact. Midline incision mild oozing, no surroudning fluctuance/induration. Ecchymosis to incisions.  Musculoskeletal: moves all four extremities equally; no calf edema or tenderness  Psychological: alert and oriented, answers questions appropriately    Pertinent Labs  Lab Results: personally reviewed.  Lab Results   Component Value Date     07/04/2023     02/15/2022     09/21/2020    CO2 25 07/04/2023    CO2 24 02/15/2022    CO2 26 09/21/2020    CO2 24 01/09/2020    BUN 11.6 07/04/2023    BUN 12 02/15/2022    BUN 12 09/21/2020    BUN 10 01/09/2020     Lab Results   Component Value Date    WBC 11.5 07/04/2023    WBC 5.8 09/21/2020    HGB 12.0 07/04/2023    HGB 13.7 07/03/2023    HGB 15.1 09/21/2020    HCT 38.9 07/04/2023    HCT 44.8 09/21/2020    MCV 92 07/04/2023    MCV 91 09/21/2020     07/04/2023     09/21/2020       Assessment/Plan: This is a 58 year old male POD #4 s/p robotic extended left colectomy for colon cancer.    WBC: 12.4 (14.0)  Hgb: 12.7 (13.7)  Cr: 0.97    -NPO/NGT  -500cc bolus this AM with high NGT output  -mIVF with D51/2 +  40K  -Strict I&Os!!  -Lovenox dvt ppx  -Abdominal binder with ambulation  -Encourage IS  -Path pending  -Recheck BMP tmrw    Discussed with Dr. Rory Bowman PA-C  [facility]  Office: [phone]    Colorectal Surgery Staff:  I have seen and examined the patient. I agree with the above documentation and plan of the fellow/PA above with the following additions/chages:    Dayday says he is feeling better today. He states that he is still passing gas. He feels more comfortable.     Afebrile, normal vitals, occasional mild tachycardia. NG 2.3L Incontinent of urine.  Abdomen is soft, but less distended. NG is bilious    Labs as above.    Pathology with no residual tumor     A/P: 58M POD #4 s/p robotic extended left colectomy, with ileus.  - Continue NG tube to suction until output lower.   - Potentially clamp trials over the weekend.  - Continue IVFs. May need to bolus intermittently if output is high.  - Path discussed with Chrissy (mother)  - Await return of bowel function.  - Strict ins/outs  - On discharge, he will NOT go on extended VTE chemoprophylaxis.    John Paul Valadez MD MBA  [facility]  Office: [phone]  7/7/2023 3:44 PM

## 2023-07-07 NOTE — PLAN OF CARE
Problem: Surgery Nonspecified  Goal: Effective Urinary Elimination  Outcome: Progressing     Problem: Surgery Nonspecified  Goal: Nausea and Vomiting Relief  Outcome: Progressing   Goal Outcome Evaluation:       [id number]... Pt had a fairly quiet shift, ambulated in the garcia x1, up in the chair for most part of the shift, NG tube to LIS and putting out greenish fluid, output has been charted. Pt denied pain, IV fluid continuous, lap sites are intact. Largely incontinent of urine x2. VSS

## 2023-07-08 LAB
ANION GAP SERPL CALCULATED.3IONS-SCNC: 9 MMOL/L (ref 7–15)
BUN SERPL-MCNC: 13 MG/DL (ref 8–23)
CALCIUM SERPL-MCNC: 8.8 MG/DL (ref 8.6–10)
CHLORIDE SERPL-SCNC: 102 MMOL/L (ref 98–107)
CREAT SERPL-MCNC: 0.88 MG/DL (ref 0.67–1.17)
DEPRECATED HCO3 PLAS-SCNC: 26 MMOL/L (ref 22–29)
GFR SERPL CREATININE-BSD FRML MDRD: >90 ML/MIN/1.73M2
GLUCOSE SERPL-MCNC: 112 MG/DL (ref 70–99)
HOLD SPECIMEN: NORMAL
MAGNESIUM SERPL-MCNC: 2 MG/DL (ref 1.7–2.3)
POTASSIUM SERPL-SCNC: 4.4 MMOL/L (ref 3.4–5.3)
SODIUM SERPL-SCNC: 137 MMOL/L (ref 136–145)

## 2023-07-08 PROCEDURE — 250N000011 HC RX IP 250 OP 636: Performed by: COLON & RECTAL SURGERY

## 2023-07-08 PROCEDURE — 80048 BASIC METABOLIC PNL TOTAL CA: CPT

## 2023-07-08 PROCEDURE — 250N000013 HC RX MED GY IP 250 OP 250 PS 637

## 2023-07-08 PROCEDURE — 120N000001 HC R&B MED SURG/OB

## 2023-07-08 PROCEDURE — 258N000003 HC RX IP 258 OP 636: Performed by: COLON & RECTAL SURGERY

## 2023-07-08 PROCEDURE — 83735 ASSAY OF MAGNESIUM: CPT

## 2023-07-08 PROCEDURE — 36415 COLL VENOUS BLD VENIPUNCTURE: CPT

## 2023-07-08 RX ADMIN — POTASSIUM CHLORIDE: 2 INJECTION, SOLUTION, CONCENTRATE INTRAVENOUS at 22:53

## 2023-07-08 RX ADMIN — DOXEPIN 3 MG: 3 TABLET, FILM COATED ORAL at 21:20

## 2023-07-08 RX ADMIN — SODIUM CHLORIDE, POTASSIUM CHLORIDE, SODIUM LACTATE AND CALCIUM CHLORIDE 500 ML: 600; 310; 30; 20 INJECTION, SOLUTION INTRAVENOUS at 11:00

## 2023-07-08 RX ADMIN — GABAPENTIN 100 MG: 100 CAPSULE ORAL at 13:50

## 2023-07-08 RX ADMIN — POTASSIUM CHLORIDE: 2 INJECTION, SOLUTION, CONCENTRATE INTRAVENOUS at 12:45

## 2023-07-08 RX ADMIN — MIRABEGRON 50 MG: 25 TABLET, FILM COATED, EXTENDED RELEASE ORAL at 17:24

## 2023-07-08 RX ADMIN — GABAPENTIN 100 MG: 100 CAPSULE ORAL at 08:40

## 2023-07-08 RX ADMIN — MELOXICAM 7.5 MG: 7.5 TABLET ORAL at 17:24

## 2023-07-08 RX ADMIN — LORAZEPAM 0.5 MG: 0.5 TABLET ORAL at 21:20

## 2023-07-08 RX ADMIN — Medication 81 MG: at 08:40

## 2023-07-08 RX ADMIN — ENOXAPARIN SODIUM 40 MG: 40 INJECTION SUBCUTANEOUS at 08:39

## 2023-07-08 RX ADMIN — LORAZEPAM 0.5 MG: 0.5 TABLET ORAL at 08:40

## 2023-07-08 RX ADMIN — POTASSIUM CHLORIDE: 2 INJECTION, SOLUTION, CONCENTRATE INTRAVENOUS at 03:35

## 2023-07-08 RX ADMIN — ARIPIPRAZOLE 30 MG: 15 TABLET ORAL at 21:20

## 2023-07-08 RX ADMIN — DOXAZOSIN 2 MG: 1 TABLET ORAL at 21:20

## 2023-07-08 RX ADMIN — HYDROXYZINE HYDROCHLORIDE 50 MG: 50 TABLET ORAL at 21:20

## 2023-07-08 RX ADMIN — FLUTICASONE PROPIONATE 2 SPRAY: 50 SPRAY, METERED NASAL at 08:39

## 2023-07-08 RX ADMIN — FLUOXETINE 60 MG: 20 CAPSULE ORAL at 08:40

## 2023-07-08 RX ADMIN — MELOXICAM 7.5 MG: 7.5 TABLET ORAL at 08:40

## 2023-07-08 RX ADMIN — GABAPENTIN 100 MG: 100 CAPSULE ORAL at 21:20

## 2023-07-08 ASSESSMENT — ACTIVITIES OF DAILY LIVING (ADL)
ADLS_ACUITY_SCORE: 23
ADLS_ACUITY_SCORE: 23
ADLS_ACUITY_SCORE: 26
ADLS_ACUITY_SCORE: 23
ADLS_ACUITY_SCORE: 26
ADLS_ACUITY_SCORE: 23
ADLS_ACUITY_SCORE: 26
ADLS_ACUITY_SCORE: 23
ADLS_ACUITY_SCORE: 23

## 2023-07-08 NOTE — PLAN OF CARE
Problem: Surgery Nonspecified  Goal: Effective Bowel Elimination  Outcome: Progressing     Problem: Surgery Nonspecified  Goal: Fluid and Electrolyte Balance  Outcome: Progressing     Problem: Surgery Nonspecified  Goal: Nausea and Vomiting Relief  Intervention: Prevent or Manage Nausea and Vomiting  Recent Flowsheet Documentation  Taken 7/8/2023 0124 by Matthias Valdes, RN  Nausea/Vomiting Interventions:   acupressure applied   aromatherapy utilized     Patient slept intermittently overnight. Up for walk this AM. NGT to LIS. 250 mL gastric secretions documented for the shift. Bowel sounds hypoactive. Patient passing flatus but no BM yet. States he wishes to be able to drink water. Remains NPO overnight. Denies nausea currently.     Matthias Valdes, RN

## 2023-07-08 NOTE — PLAN OF CARE
Goal Outcome Evaluation:               Alert and oriented. Up with standby assist with walker and gait belt to bathroom and in halls this morning. NG clamped with morning meds and then continued clamping when GI surgery ordered 4hour trial of clamp. 4 hours up at 1240 today. Denies nausea and pain.

## 2023-07-08 NOTE — PLAN OF CARE
Problem: Surgery Nonspecified  Goal: Nausea and Vomiting Relief  Outcome: Progressing   Goal Outcome Evaluation:     Removed NG tube in afternoon after 4 hours of being clamped. Minimal output after reattaching to suction. Per Surgery ok to remove. Patient continues to deny nausea and pain. Actually asking for food and reports being hungry. Spoke with Dr Smith this evening regarding diet and she said it was ok to start some clear liquids. Patient reminded to eat slowly and report nausea.

## 2023-07-08 NOTE — PLAN OF CARE
Problem: Surgery Nonspecified  Goal: Optimal Pain Control and Function  Outcome: Progressing     Problem: Surgery Nonspecified  Goal: Nausea and Vomiting Relief  Outcome: Progressing     Problem: Plan of Care - These are the overarching goals to be used throughout the patient stay.    Goal: Optimal Comfort and Wellbeing  Outcome: Progressing   Goal Outcome Evaluation:    Patient is A/O x4; VSS on RA  ; denies pain,   NPO and very hungry, hopes to discontinue NG tube asap;  pt reports that his mom says he is cancer free now; alert and oriented times 4 yet may have legal guardian;  green op from NG tube; pt able to swallow meds with small amounts of h2o due to NPO status; walked w PT today; up in chair much of the day; no nausea or vomiting; incontinent with brief

## 2023-07-08 NOTE — PROGRESS NOTES
Colorectal Surgery Progress Note  Date: 7/8/2023       S: no acute events onoc. Denies bloating or N/V. Passing some gas. NG output 1.4 l    O: Temp:  [98.9  F (37.2  C)-99.1  F (37.3  C)] 98.9  F (37.2  C)  Pulse:  [] 85  Resp:  [18] 18  BP: (118-120)/(55-65) 120/55  SpO2:  [92 %-94 %] 92 %    Intake/Output Summary (Last 24 hours) at 7/8/2023 0850  Last data filed at 7/8/2023 0817  Gross per 24 hour   Intake 1777 ml   Output 2700 ml   Net -923 ml       Exam:  Gen: aaox3, nad  Pulm: breathing nonlabored  Abd: soft, mild distension  Ext: moving all ext  Incision: ecchymosis around incisions, no erythema  Ng bilious      A/P: pod 5 s/p robotic left colectomy  - 500ml bolus  - clamping trial today with ng tube  - increase activity    Yisel Smith MD, MS, FACS, FASCRS  [facility]  Office: [phone]  7/8/2023 8:50 AM

## 2023-07-09 ENCOUNTER — APPOINTMENT (OUTPATIENT)
Dept: RADIOLOGY | Facility: HOSPITAL | Age: 58
DRG: 330 | End: 2023-07-09
Attending: COLON & RECTAL SURGERY
Payer: COMMERCIAL

## 2023-07-09 ENCOUNTER — APPOINTMENT (OUTPATIENT)
Dept: PHYSICAL THERAPY | Facility: HOSPITAL | Age: 58
DRG: 330 | End: 2023-07-09
Attending: COLON & RECTAL SURGERY
Payer: COMMERCIAL

## 2023-07-09 LAB
HOLD SPECIMEN: NORMAL
MAGNESIUM SERPL-MCNC: 1.4 MG/DL (ref 1.7–2.3)
MAGNESIUM SERPL-MCNC: 2.6 MG/DL (ref 1.7–2.3)
POTASSIUM SERPL-SCNC: 4.5 MMOL/L (ref 3.4–5.3)

## 2023-07-09 PROCEDURE — 74018 RADEX ABDOMEN 1 VIEW: CPT

## 2023-07-09 PROCEDURE — 250N000011 HC RX IP 250 OP 636: Mod: JZ | Performed by: COLON & RECTAL SURGERY

## 2023-07-09 PROCEDURE — 71045 X-RAY EXAM CHEST 1 VIEW: CPT

## 2023-07-09 PROCEDURE — 999N000065 XR ABDOMEN PORT 1 VIEW

## 2023-07-09 PROCEDURE — 74018 RADEX ABDOMEN 1 VIEW: CPT | Mod: 77

## 2023-07-09 PROCEDURE — 94640 AIRWAY INHALATION TREATMENT: CPT

## 2023-07-09 PROCEDURE — 97110 THERAPEUTIC EXERCISES: CPT | Mod: GP

## 2023-07-09 PROCEDURE — 36415 COLL VENOUS BLD VENIPUNCTURE: CPT | Performed by: COLON & RECTAL SURGERY

## 2023-07-09 PROCEDURE — 83735 ASSAY OF MAGNESIUM: CPT | Performed by: COLON & RECTAL SURGERY

## 2023-07-09 PROCEDURE — 250N000009 HC RX 250: Performed by: STUDENT IN AN ORGANIZED HEALTH CARE EDUCATION/TRAINING PROGRAM

## 2023-07-09 PROCEDURE — 250N000013 HC RX MED GY IP 250 OP 250 PS 637

## 2023-07-09 PROCEDURE — 120N000001 HC R&B MED SURG/OB

## 2023-07-09 PROCEDURE — 84132 ASSAY OF SERUM POTASSIUM: CPT | Performed by: COLON & RECTAL SURGERY

## 2023-07-09 RX ORDER — MAGNESIUM SULFATE 4 G/50ML
4 INJECTION INTRAVENOUS ONCE
Status: COMPLETED | OUTPATIENT
Start: 2023-07-09 | End: 2023-07-09

## 2023-07-09 RX ADMIN — DOXAZOSIN 2 MG: 1 TABLET ORAL at 20:41

## 2023-07-09 RX ADMIN — POTASSIUM CHLORIDE: 2 INJECTION, SOLUTION, CONCENTRATE INTRAVENOUS at 06:42

## 2023-07-09 RX ADMIN — MIRABEGRON 50 MG: 25 TABLET, FILM COATED, EXTENDED RELEASE ORAL at 18:04

## 2023-07-09 RX ADMIN — LORAZEPAM 0.5 MG: 0.5 TABLET ORAL at 12:53

## 2023-07-09 RX ADMIN — MAGNESIUM SULFATE HEPTAHYDRATE 4 G: 80 INJECTION, SOLUTION INTRAVENOUS at 12:21

## 2023-07-09 RX ADMIN — ALBUTEROL SULFATE 2.5 MG: 2.5 SOLUTION RESPIRATORY (INHALATION) at 06:58

## 2023-07-09 RX ADMIN — Medication 81 MG: at 12:12

## 2023-07-09 RX ADMIN — MELOXICAM 7.5 MG: 7.5 TABLET ORAL at 18:04

## 2023-07-09 RX ADMIN — FLUOXETINE 60 MG: 20 CAPSULE ORAL at 12:12

## 2023-07-09 RX ADMIN — ARIPIPRAZOLE 30 MG: 15 TABLET ORAL at 21:46

## 2023-07-09 RX ADMIN — POTASSIUM CHLORIDE: 2 INJECTION, SOLUTION, CONCENTRATE INTRAVENOUS at 18:05

## 2023-07-09 RX ADMIN — HYDROXYZINE HYDROCHLORIDE 50 MG: 50 TABLET ORAL at 21:46

## 2023-07-09 RX ADMIN — GABAPENTIN 100 MG: 100 CAPSULE ORAL at 18:04

## 2023-07-09 RX ADMIN — FLUTICASONE PROPIONATE 2 SPRAY: 50 SPRAY, METERED NASAL at 10:24

## 2023-07-09 RX ADMIN — GABAPENTIN 100 MG: 100 CAPSULE ORAL at 21:46

## 2023-07-09 RX ADMIN — DOXEPIN 3 MG: 3 TABLET, FILM COATED ORAL at 21:48

## 2023-07-09 RX ADMIN — GABAPENTIN 100 MG: 100 CAPSULE ORAL at 12:12

## 2023-07-09 RX ADMIN — MELOXICAM 7.5 MG: 7.5 TABLET ORAL at 12:12

## 2023-07-09 RX ADMIN — ENOXAPARIN SODIUM 40 MG: 40 INJECTION SUBCUTANEOUS at 10:24

## 2023-07-09 RX ADMIN — LORAZEPAM 0.5 MG: 0.5 TABLET ORAL at 20:41

## 2023-07-09 ASSESSMENT — ACTIVITIES OF DAILY LIVING (ADL)
ADLS_ACUITY_SCORE: 28
ADLS_ACUITY_SCORE: 26
ADLS_ACUITY_SCORE: 25
ADLS_ACUITY_SCORE: 28
ADLS_ACUITY_SCORE: 26
ADLS_ACUITY_SCORE: 28
ADLS_ACUITY_SCORE: 25
ADLS_ACUITY_SCORE: 26

## 2023-07-09 NOTE — SIGNIFICANT EVENT
Patient ambulated halls at 0630 and returned to room with respiratory distress with wheezing, elevate RR and HR with adequate O2 saturations. RT was called to administer nebulizer for wheezing and evaluate respiratory distress. Patient had large emesis while RT was on route to room. Colorectal surgery was called and updated on patient changes and ordered chest and abdominal XR and resumed NPO status. Patient's respiratory distress mildly improved after nebulizer treatment.    Matthias Valdes RN

## 2023-07-09 NOTE — PLAN OF CARE
Goal Outcome Evaluation:             Alert and oriented. Asking for help with getting up. Walking halls and passing gas, burping. This afternoon, no respiratory distress with walk. Up to toilet to void with walker gait belt one assist. IV fluids continuous. Denies nausea after vomiting bout at start of shift. Meds given later with sip of water and tolerated. No pain.

## 2023-07-09 NOTE — PLAN OF CARE
"  Problem: Surgery Nonspecified  Goal: Nausea and Vomiting Relief  Outcome: Progressing     Problem: Surgery Nonspecified  Goal: Effective Oxygenation and Ventilation  Outcome: Progressing     Problem: Surgery Nonspecified  Goal: Effective Bowel Elimination  Outcome: Progressing     Patient slept poorly overnight. Frequent incontinent watery stools overnight. Tolerated small amounts of clear liquids throughout the night. Began having respiratory distress with wheezing this AM after ambulating. Had large emesis once during episode of respiratory distress. Denies feeling any nausea, stated \"it just came out of nowhere\". Colorectal team updated on changes, orders entered. Patient now NPO. Awaiting chest and abdomen xrays.    Matthias Valdes RN    "

## 2023-07-09 NOTE — PROGRESS NOTES
Care Management Follow Up    Length of Stay (days): 6    Expected Discharge Date: 07/12/2023     Concerns to be Addressed: discharge planning, care progression     Patient plan of care discussed at interdisciplinary rounds: No    Anticipated Discharge Disposition: Group Home     Anticipated Discharge Services: Home PT and OT  Anticipated Discharge DME:  (per treatment team)    Patient/family educated on Medicare website which has current facility and service quality ratings:  (N/A)  Education Provided on the Discharge Plan: Yes, pt and mother (legal guardian)  Patient/Family in Agreement with the Plan: yes    Referrals Placed by CM/SW: Home Care  Private pay costs discussed: Not applicable    Additional Information:  Chart reviewed.  Pt admitted for Colon Cancer, POD #6 left Colectomy.  This morning pt developed respiratory distress while ambulating in halls.  Symptoms improved and currently on room air. NPO, Plan to re-insert NG tube per Colorectal Surgery.    Met with pt and motherChrissy (legal guardian) in room to discuss discharge planning and recommendation for home PT and OT.  Both are agreeable with Home Care services and do no have a preference on agencies.  Referral sent.  Pt is from [facility] and [facility] states staff usually transport pt back home at time of discharge.  Mother requests to have a family care conference, to include Group Home and hospital staff, at some point this week when pt improves and is nearing discharge.     Nell Farias RN

## 2023-07-09 NOTE — PROGRESS NOTES
Colorectal Surgery Progress Note  Date: 7/9/2023       S: had trouble breathing after walking and had large emesis. cxr and axr obtained. Breathing back to normal. Reports passing flatus but distended.     O: Temp:  [97.5  F (36.4  C)-98.7  F (37.1  C)] 97.8  F (36.6  C)  Pulse:  [] 120  Resp:  [18-20] 20  BP: (120-140)/(64-74) 140/73  SpO2:  [90 %-94 %] 90 %    Intake/Output Summary (Last 24 hours) at 7/9/2023 1328  Last data filed at 7/9/2023 1202  Gross per 24 hour   Intake 3430 ml   Output 1377 ml   Net 2053 ml       Exam:  Gen: aaox3, nad  Pulm: breathing nonlabored  Abd: distended, soft  Ext: moving all ext  Incision: c/d/i      Imaging:  CXR: Hypoinflated lungs. Mild increased vascular congestion. No focal airspace disease identified. Normal cardiac silhouette.  AXR: Multiple gas dilated small bowel loops. Moderate gas distends the stomach. This may be a developing small bowel obstruction    A/P:  Pod 6 s/p robotic extended left colectomy, passed clamping trial yesterday and ng removed but then emesis again this morning. Suspect still resolving ileus vs obstruction  - reinsert ng  - npo  - ivf  - labs tomorrow  - consider further imaging tomorrow  - lovenox for dvt ppx    Yisel Smith MD, MS, FACS, FASCRS  [facility]  Office: [phone]  7/9/2023 1:28 PM

## 2023-07-09 NOTE — PROGRESS NOTES
NG tube placed on pt 1630 tonight. 53 cm marked, nurse got verbal order from on call colorectal to obtain order for placement verification.    1845  -Nurse contacted X-ray to double check time they are able to come up to verify, X-Ray stated they are backed up and will come up to do scan ASAP.    -Nurse talked to colorectal updating on X-Ray situation, allowed to put on to low continuous suction per orders.

## 2023-07-10 ENCOUNTER — APPOINTMENT (OUTPATIENT)
Dept: OCCUPATIONAL THERAPY | Facility: HOSPITAL | Age: 58
DRG: 330 | End: 2023-07-10
Attending: COLON & RECTAL SURGERY
Payer: COMMERCIAL

## 2023-07-10 ENCOUNTER — APPOINTMENT (OUTPATIENT)
Dept: CT IMAGING | Facility: HOSPITAL | Age: 58
DRG: 330 | End: 2023-07-10
Attending: COLON & RECTAL SURGERY
Payer: COMMERCIAL

## 2023-07-10 LAB
ANION GAP SERPL CALCULATED.3IONS-SCNC: 10 MMOL/L (ref 7–15)
BUN SERPL-MCNC: 20.4 MG/DL (ref 8–23)
CALCIUM SERPL-MCNC: 9.1 MG/DL (ref 8.6–10)
CHLORIDE SERPL-SCNC: 102 MMOL/L (ref 98–107)
CREAT SERPL-MCNC: 1.08 MG/DL (ref 0.67–1.17)
DEPRECATED HCO3 PLAS-SCNC: 23 MMOL/L (ref 22–29)
ERYTHROCYTE [DISTWIDTH] IN BLOOD BY AUTOMATED COUNT: 13.8 % (ref 10–15)
GFR SERPL CREATININE-BSD FRML MDRD: 79 ML/MIN/1.73M2
GLUCOSE BLDC GLUCOMTR-MCNC: 121 MG/DL (ref 70–99)
GLUCOSE SERPL-MCNC: 131 MG/DL (ref 70–99)
HCT VFR BLD AUTO: 40.7 % (ref 40–53)
HGB BLD-MCNC: 12.7 G/DL (ref 13.3–17.7)
MAGNESIUM SERPL-MCNC: 2.5 MG/DL (ref 1.7–2.3)
MCH RBC QN AUTO: 28.3 PG (ref 26.5–33)
MCHC RBC AUTO-ENTMCNC: 31.2 G/DL (ref 31.5–36.5)
MCV RBC AUTO: 91 FL (ref 78–100)
PLATELET # BLD AUTO: 239 10E3/UL (ref 150–450)
POTASSIUM SERPL-SCNC: 4.7 MMOL/L (ref 3.4–5.3)
RBC # BLD AUTO: 4.49 10E6/UL (ref 4.4–5.9)
SODIUM SERPL-SCNC: 135 MMOL/L (ref 136–145)
WBC # BLD AUTO: 12.3 10E3/UL (ref 4–11)

## 2023-07-10 PROCEDURE — 250N000011 HC RX IP 250 OP 636: Performed by: COLON & RECTAL SURGERY

## 2023-07-10 PROCEDURE — 3E0436Z INTRODUCTION OF NUTRITIONAL SUBSTANCE INTO CENTRAL VEIN, PERCUTANEOUS APPROACH: ICD-10-PCS | Performed by: COLON & RECTAL SURGERY

## 2023-07-10 PROCEDURE — 250N000011 HC RX IP 250 OP 636: Mod: JZ | Performed by: COLON & RECTAL SURGERY

## 2023-07-10 PROCEDURE — 85027 COMPLETE CBC AUTOMATED: CPT | Performed by: COLON & RECTAL SURGERY

## 2023-07-10 PROCEDURE — 272N000451 HC KIT SHRLOCK 5FR POWER PICC DOUBLE LUMEN

## 2023-07-10 PROCEDURE — 36415 COLL VENOUS BLD VENIPUNCTURE: CPT | Performed by: COLON & RECTAL SURGERY

## 2023-07-10 PROCEDURE — 250N000009 HC RX 250: Mod: JZ | Performed by: COLON & RECTAL SURGERY

## 2023-07-10 PROCEDURE — 36569 INSJ PICC 5 YR+ W/O IMAGING: CPT

## 2023-07-10 PROCEDURE — 258N000003 HC RX IP 258 OP 636: Performed by: COLON & RECTAL SURGERY

## 2023-07-10 PROCEDURE — B4185 PARENTERAL SOL 10 GM LIPIDS: HCPCS | Mod: JZ | Performed by: COLON & RECTAL SURGERY

## 2023-07-10 PROCEDURE — 120N000001 HC R&B MED SURG/OB

## 2023-07-10 PROCEDURE — 250N000011 HC RX IP 250 OP 636: Mod: JZ

## 2023-07-10 PROCEDURE — 250N000009 HC RX 250: Performed by: COLON & RECTAL SURGERY

## 2023-07-10 PROCEDURE — 83735 ASSAY OF MAGNESIUM: CPT | Performed by: COLON & RECTAL SURGERY

## 2023-07-10 PROCEDURE — 74177 CT ABD & PELVIS W/CONTRAST: CPT

## 2023-07-10 PROCEDURE — 250N000013 HC RX MED GY IP 250 OP 250 PS 637

## 2023-07-10 PROCEDURE — 80048 BASIC METABOLIC PNL TOTAL CA: CPT | Performed by: COLON & RECTAL SURGERY

## 2023-07-10 PROCEDURE — 97535 SELF CARE MNGMENT TRAINING: CPT | Mod: GO

## 2023-07-10 PROCEDURE — 97110 THERAPEUTIC EXERCISES: CPT | Mod: GO

## 2023-07-10 RX ORDER — LIDOCAINE 40 MG/G
CREAM TOPICAL
Status: ACTIVE | OUTPATIENT
Start: 2023-07-10 | End: 2023-07-13

## 2023-07-10 RX ORDER — DEXTROSE MONOHYDRATE 100 MG/ML
INJECTION, SOLUTION INTRAVENOUS CONTINUOUS PRN
Status: DISCONTINUED | OUTPATIENT
Start: 2023-07-10 | End: 2023-07-21 | Stop reason: HOSPADM

## 2023-07-10 RX ORDER — IOPAMIDOL 755 MG/ML
90 INJECTION, SOLUTION INTRAVASCULAR ONCE
Status: COMPLETED | OUTPATIENT
Start: 2023-07-10 | End: 2023-07-10

## 2023-07-10 RX ADMIN — SODIUM CHLORIDE, POTASSIUM CHLORIDE, SODIUM LACTATE AND CALCIUM CHLORIDE 500 ML: 600; 310; 30; 20 INJECTION, SOLUTION INTRAVENOUS at 15:09

## 2023-07-10 RX ADMIN — HYDROXYZINE HYDROCHLORIDE 50 MG: 50 TABLET ORAL at 21:59

## 2023-07-10 RX ADMIN — ACETAMINOPHEN 650 MG: 325 TABLET ORAL at 23:45

## 2023-07-10 RX ADMIN — GABAPENTIN 100 MG: 100 CAPSULE ORAL at 16:50

## 2023-07-10 RX ADMIN — ENOXAPARIN SODIUM 40 MG: 40 INJECTION SUBCUTANEOUS at 08:39

## 2023-07-10 RX ADMIN — ARIPIPRAZOLE 30 MG: 15 TABLET ORAL at 21:59

## 2023-07-10 RX ADMIN — OLIVE OIL AND SOYBEAN OIL 250 ML: 16; 4 INJECTION, EMULSION INTRAVENOUS at 19:58

## 2023-07-10 RX ADMIN — LORAZEPAM 0.5 MG: 0.5 TABLET ORAL at 08:41

## 2023-07-10 RX ADMIN — LORAZEPAM 0.5 MG: 0.5 TABLET ORAL at 20:32

## 2023-07-10 RX ADMIN — POTASSIUM CHLORIDE: 2 INJECTION, SOLUTION, CONCENTRATE INTRAVENOUS at 13:46

## 2023-07-10 RX ADMIN — MIRABEGRON 50 MG: 25 TABLET, FILM COATED, EXTENDED RELEASE ORAL at 16:50

## 2023-07-10 RX ADMIN — MELOXICAM 7.5 MG: 7.5 TABLET ORAL at 08:41

## 2023-07-10 RX ADMIN — FLUTICASONE PROPIONATE 2 SPRAY: 50 SPRAY, METERED NASAL at 08:40

## 2023-07-10 RX ADMIN — DOXAZOSIN 2 MG: 1 TABLET ORAL at 20:32

## 2023-07-10 RX ADMIN — HYDROMORPHONE HYDROCHLORIDE 0.2 MG: 0.2 INJECTION, SOLUTION INTRAMUSCULAR; INTRAVENOUS; SUBCUTANEOUS at 16:50

## 2023-07-10 RX ADMIN — POTASSIUM CHLORIDE: 2 INJECTION, SOLUTION, CONCENTRATE INTRAVENOUS at 22:17

## 2023-07-10 RX ADMIN — Medication 81 MG: at 08:41

## 2023-07-10 RX ADMIN — IOPAMIDOL 90 ML: 755 INJECTION, SOLUTION INTRAVENOUS at 09:36

## 2023-07-10 RX ADMIN — FLUOXETINE 60 MG: 20 CAPSULE ORAL at 08:41

## 2023-07-10 RX ADMIN — ASCORBIC ACID, VITAMIN A PALMITATE, CHOLECALCIFEROL, THIAMINE HYDROCHLORIDE, RIBOFLAVIN-5 PHOSPHATE SODIUM, PYRIDOXINE HYDROCHLORIDE, NIACINAMIDE, DEXPANTHENOL, ALPHA-TOCOPHEROL ACETATE, VITAMIN K1, FOLIC ACID, BIOTIN, CYANOCOBALAMIN: 200; 3300; 200; 6; 3.6; 6; 40; 15; 10; 150; 600; 60; 5 INJECTION, SOLUTION INTRAVENOUS at 19:59

## 2023-07-10 RX ADMIN — GABAPENTIN 100 MG: 100 CAPSULE ORAL at 21:59

## 2023-07-10 RX ADMIN — GABAPENTIN 100 MG: 100 CAPSULE ORAL at 08:41

## 2023-07-10 RX ADMIN — POTASSIUM CHLORIDE: 2 INJECTION, SOLUTION, CONCENTRATE INTRAVENOUS at 04:11

## 2023-07-10 RX ADMIN — LIDOCAINE HYDROCHLORIDE 1.5 ML: 10 INJECTION, SOLUTION EPIDURAL; INFILTRATION; INTRACAUDAL; PERINEURAL at 15:40

## 2023-07-10 RX ADMIN — MELOXICAM 7.5 MG: 7.5 TABLET ORAL at 19:01

## 2023-07-10 RX ADMIN — DOXEPIN 3 MG: 3 TABLET, FILM COATED ORAL at 21:59

## 2023-07-10 ASSESSMENT — ACTIVITIES OF DAILY LIVING (ADL)
ADLS_ACUITY_SCORE: 27
ADLS_ACUITY_SCORE: 27
ADLS_ACUITY_SCORE: 28
ADLS_ACUITY_SCORE: 27
ADLS_ACUITY_SCORE: 28
ADLS_ACUITY_SCORE: 27
ADLS_ACUITY_SCORE: 28

## 2023-07-10 NOTE — PROGRESS NOTES
CLINICAL NUTRITION SERVICES - ASSESSMENT NOTE     Nutrition Prescription    RECOMMENDATIONS FOR MDs/PROVIDERS TO ORDER:  Consider decreasing dextrose infusion with TPN start.    Malnutrition Status:    Not noted    Recommendations already ordered by Registered Dietitian (RD):   Initiate Clinimix formula 5/15 at 35 ml/hr continuous with 250 ml 20% daily lipid.  Request add MVI to TPN.     Future/Additional Recommendations:  Adjust TPN daily toward meeting needs.       REASON FOR ASSESSMENT  Dayday Silva is a/an 58 year old male assessed by the dietitian for LOS and Provider Order - Pharmacy/Nutrition to start and manage TPN for Bowel obstruction.  Central line ordered per MD.    NUTRITION HISTORY  Patient is day 7 post op robotic extended left colectomy, with new bowel obstruction noted today on Abdominal CT.  Patient admitted for resection of colon cancer.  Hx of prostate cancer, depression, obesity, schizoaffective disorder.  Patient lives in a group home.  Patient reports good po intake before surgery on a regular diet.    CURRENT NUTRITION ORDERS  Diet: NPO day 7  D5% with 1/2 NS and potassium infusing at 125 ml/hr continuous = 3000 ml fluid, 150 g carb, 510 kcals.    LABS  [facility]   Lab 07/10/23  0626 07/09/23  1658 07/09/23  0955 07/08/23  0624 07/07/23  0607 07/06/23  0535   *  --   --  137 141 138   POTASSIUM 4.7  --  4.5 4.4 4.3 3.8   *  --   --  112* 117* 113*   BUN 20.4  --   --  13.0 14.3 14.1   CR 1.08  --   --  0.88 0.97 0.89   REX 9.1  --   --  8.8 9.4 8.8   MAG 2.5* 2.6* 1.4* 2.0 2.0 2.2   no recent alk phos, ALT, ALT, Bilirubin, triglyceride.  Labs reviewed    MEDICATIONS    ARIPiprazole  30 mg Oral At Bedtime     aspirin  81 mg Oral Daily     doxazosin  2 mg Oral QPM     doxepin  3 mg Oral At Bedtime     enoxaparin ANTICOAGULANT  40 mg Subcutaneous Q24H     FLUoxetine  60 mg Oral Daily     fluticasone  2 spray Both Nostrils Daily     gabapentin  100 mg Oral TID     hydrOXYzine   50 mg Oral At Bedtime     LORazepam  0.5 mg Oral BID     meloxicam  7.5 mg Oral BID w/meals     mirabegron  50 mg Oral Daily     sodium chloride (PF)  3 mL Intracatheter Q8H        dextrose 5% and 0.45% NaCl 1,000 mL with potassium chloride 40 mEq/L infusion 125 mL/hr at 07/10/23 0833      acetaminophen, albuterol, sore throat, calcium carbonate, famotidine, HYDROmorphone **OR** HYDROmorphone, lidocaine 4%, lidocaine 4%, lidocaine (buffered or not buffered), lidocaine (buffered or not buffered), naloxone **OR** naloxone **OR** naloxone **OR** naloxone, ondansetron **OR** ondansetron, sodium chloride (PF), sodium chloride (PF)   Medications reviewed    ANTHROPOMETRICS  Height: 0 cm (Data Unavailable)  Most Recent Weight: 116.8 kg (257 lb 9.6 oz)    BMI: Obesity Grade III BMI >40  Weight History:   Wt Readings from Last 6 Encounters:   07/04/23 116.8 kg (257 lb 9.6 oz)   12/01/22 117.9 kg (260 lb)   02/15/22 112.2 kg (247 lb 4 oz)   03/09/20 103.9 kg (229 lb 2 oz)   01/09/20 103.7 kg (228 lb 9.6 oz)   10/16/19 103.4 kg (228 lb)     Dosing Weight: 75.5 kg    ASSESSED NUTRITION NEEDS  Estimated Energy Needs: 3244-7811 kcals/day (25 - 30 kcals/kg)  Justification: Obese and Post-op  Estimated Protein Needs: + grams protein/day (1.2 - 1.5 grams of pro/kg)  Justification: Post-op  Estimated Fluid Needs: 1129-0757 mL/day (30 - 35 mL/kg)   Justification: Maintenance    PHYSICAL FINDINGS  See malnutrition section below.  Surgical incisions   NG 1150 ml out in 24 /hrs.  7/10 abdominal CT IMPRESSION:   1.  Surgical changes of a left hemicolectomy. There are a few tiny locules of pericolonic extraluminal air along the resection site which is compatible with recent postsurgical change. No evidence of abscess or free air. Small amount of free fluid within   the abdomen and pelvis is also compatible with recent postsurgical change.  2.  Multiple loops of dilated fluid and air-filled small bowel within the left abdomen. There  is an abrupt point of transition of the dilated bowel within the anterior mid abdomen. Findings are suggestive of a bowel obstruction.  3.  Cholelithiasis.     MALNUTRITION:  % Weight Loss:  None noted  % Intake:  </= 50% for >/= 5 days (severe malnutrition)  Subcutaneous Fat Loss:  None observed  Muscle Loss:  None observed  Fluid Retention:  None noted    Malnutrition Diagnosis: Patient does not meet two of the above criteria necessary for diagnosing malnutrition    NUTRITION DIAGNOSIS  Inadequate oral intake related to acute illness as evidenced by NPO x 7 days, need for TPN.      INTERVENTIONS  Implementation  Nutrition Education: discussed TPN plan with patient.   Parenteral Nutrition/IV Fluids - with plan of central line:  Initiate Clinimix formula 5/15 at 35 ml/hr continuous with 250 ml 20% daily lipid.  This to provide 840 ml +250 ml lipid, 1096 kcals, 42 g protein, 126 g carb, 50 g lipid.  Request add MVI to TPN.    Plan to titrate TPN up daily toward meeting patient's needs.    Consider decreasing Dextrose infusion with start of TPN.    Goals  Tolerate TPN  Meet nutrition needs  Electrolytes WNL       Monitoring/Evaluation  Progress toward goals will be monitored and evaluated per protocol.

## 2023-07-10 NOTE — PROCEDURES
"PICC Line Insertion Procedure Note     Pt. Name:   Dayday Silva     MRN:          [id number]     Procedure: Insertion of a  Dual Lumen  5 fr  Bard SOLO (valved) Power PICC, Lot number [id number]     Indications: plan for TPN     Contraindications : none     Procedure Details:     Patient identified with 2 identifiers and \"Time Out\" conducted.     Central line insertion bundle followed: hand hygiene performed prior to procedure, site cleansed with Cholraprep (CHG), hat, mask, sterile gloves, sterile gown worn, patient draped with maximum barrier head to toe drape, sterile field maintained.     The vein was assessed and found to be compressible and of adequate size.      Lidocaine 1% 1.5 ml administered SQ to the insertion site.      Modified Seldinger Technique (MST) used for insertion, one attempt(s) required to access vein.      A 5 Fr PICC was inserted into the basilic vein of the right upper arm.        Catheter threaded without difficulty. Good blood return noted.     Catheter was flushed with 10 cc normal saline.      Catheter secured with Statlock, Biopatch, and Tegaderm dressing applied.     The sharps that are included in the PICC insertion kit were accounted for and disposed of in the sharps container prior to breakdown of the sterile field.     CLABSI prevention brochure left at bedside.     Patient tolerated procedure well.      Patient's primary RN notified PICC is ready for use.       Findings:     Total catheter length  39 cm, with 0 cm exposed. Mid upper arm circumference is 44 cm.      Tip placement verified in the SVC/RA junction by [employer]:         Comments:  The PICC is properly positioned and ready for use.         Kiana Salmeron RN BSN  [facility]   "

## 2023-07-10 NOTE — PLAN OF CARE
Problem: Surgery Nonspecified  Goal: Effective Bowel Elimination  Outcome: Not Progressing     Problem: Surgery Nonspecified  Goal: Optimal Pain Control and Function  Outcome: Progressing     Problem: Parenteral Nutrition  Goal: Effective Intravenous Nutrition Therapy Delivery  Outcome: Progressing  Intervention: Optimize Intravenous Nutrition Delivery  Recent Flowsheet Documentation  Taken 7/10/2023 0841 by Juliana Scherer, RN  Medication Review/Management: medications reviewed     Patient VSS on RA this shift, A+Ox4, denying pain. NG in place, advanced 10 cm per surgeon order, at 70 cm and LIS, total of 900 ml of dark green, think output this shift. Abd CT suggestive of bowel obstruction, patient to start on TPN. PICC placement started at end of writer's shift. IVF per orders. Mag and K levels WNL, no replacement per protocols, recheck in the morning.     For vital signs and complete assessments, please see documentation flowsheets. For detailed medication administrations, please see JASS Scherer, RN 7/10/2023  [phone]

## 2023-07-10 NOTE — PLAN OF CARE
Goal Outcome Evaluation:        Problem: Surgery Nonspecified  Goal: Optimal Pain Control and Function  Intervention: Prevent or Manage Pain  Recent Flowsheet Documentation  Taken 7/10/2023 1650 by Michelle Munson RN  Pain Management Interventions: medication (see MAR)  PRN Dilaudid given for shoulder pain; per pt helpful. Pt slept afterwards.     Problem: Parenteral Nutrition  Goal: Effective Intravenous Nutrition Therapy Delivery  Outcome: Progressing   TPN 35 ml/hr and lipids 20.8 ml/hr running per order.   Maintenance IV fluid D5%, NS 0.45% + K 40 mEq at 125 ml/hr running per order.    ABD incision clean, dry and intact.  NG tube output 450 ml, low intermittent suction.  Pt ambulated the hallways up to 300 feet.  Pt is resting quietly. No s/s resp distress.

## 2023-07-10 NOTE — PHARMACY-CONSULT NOTE
"Pharmacy Note: Parenteral Nutrition (PN) Management    Pharmacist consulted to dose PN for Dayday Silva, a 58 year old male by Cynthia Lloyd PA-C.    Subjective:    The patient is a new PN start.    The patient was started on PN in the hospital on 7/10/23.    Indication for PN therapy: bowel obstruction    Inadequate nutrition existing for > 7 days.     Enteral nutrition contraindicated due to: small bowel obstruction.    Social History     Tobacco Use     Smoking status: Former     Packs/day: 1.00     Years: 32.00     Pack years: 32.00     Types: Cigarettes     Start date: 1984     Quit date: 2016     Years since quittin.5     Smokeless tobacco: Former   Vaping Use     Vaping Use: Never used   Substance Use Topics     Alcohol use: Yes     Comment: occasional     Drug use: No     Objective:    Ht Readings from Last 1 Encounters:   22 1.651 m (5' 5\")     Wt Readings from Last 1 Encounters:   23 116.8 kg (257 lb 9.6 oz)       Body mass index is 42.87 kg/m .    No data found.    Labs:  Last 3 days:  Recent Labs     23  0624 23  0955 23  1658 07/10/23  0626     --   --  135*   POTASSIUM 4.4 4.5  --  4.7   CHLORIDE 102  --   --  102   CO2 26  --   --  23   BUN 13.0  --   --  20.4   CR 0.88  --   --  1.08   REX 8.8  --   --  9.1   MAG 2.0 1.4* 2.6* 2.5*   HGB  --   --   --  12.7*   HCT  --   --   --  40.7   PLT  --   --   --  239       Glucose (past 48 hours):   Recent Labs     07/10/23  0626   *       Intake/Output (last 24 hours): I/O last 3 completed shifts:  In: 650 [I.V.:650]  Out: 902 [Urine:350; Emesis/NG output:552]    Estimated CrCl: Estimated Creatinine Clearance: 87.1 mL/min (based on SCr of 1.08 mg/dL).    Assessment:    Continue patient on PN therapy as a continuous central therapy.     Given the patient's current condition/oral intake, PN is still indicated.    Lab results reviewed:     7/10: Na slightly low and Mg slightly high. As TPN to initiate at " a low rate, patient should be appropriate for Clinimix as recommended by RD for the first bag (consult received too late in day to compound custom TPN).  Would be able to switch to custom formulation tomorrow if indicated. Of note, PICC line has been ordered but placement has not been completed at the time of this consult.    Plan:  1. Rate of PN: 35 ml/hr  2. Formula:     Amino Acids 42 grams    Dextrose 126 grams    Sodium 29.4 mEq/day    Potassium 25.2 mEq/day    Calcium 3.78 mEq/day    Magnesium 4.2 mEq/day    Phosphorus 12.6 mMol/day    Chloride: Acetate Ratio 1:2    Standard Multivitamins w/Vitamin K    Trace Elements  3. Fat Emulsion: 20%, 250 mL IV daily  4. Check BMP and Mag, Phos labs tomorrow.  5. Pharmacist will continue to follow the patient's lab results, clinical status and blood glucose results and make adjustments as appropriate.    Thank you for the consult.  Danette Morgan [facility]  7/10/2023 2:19 PM

## 2023-07-10 NOTE — PROGRESS NOTES
Colorectal Surgery Progress Note  Date: 7/10/2023     S: NGT replaced yesterday for large emesis. No BM/no flatus. Pt trying to ambulate but overall feels ok.    I/   O/ Uop 350, , stool BMx2    O: Temp:  [97.8  F (36.6  C)-98.7  F (37.1  C)] 98.7  F (37.1  C)  Pulse:  [] 81  Resp:  [18-20] 18  BP: (119-140)/(57-85) 138/71  SpO2:  [90 %-95 %] 95 %    Intake/Output Summary (Last 24 hours) at 7/9/2023 1328  Last data filed at 7/9/2023 1202  Gross per 24 hour   Intake 3430 ml   Output 1377 ml   Net 2053 ml       Exam:  Gen: aaox3, nad  Pulm: breathing nonlabored  Abd: distended, soft  Ext: moving all ext  Incision: c/d/i    Cbc wbc 12.3, hgb 12.7  Bmp cr 1.08    A/P:  Pod 7 s/p robotic extended left colectomy, with resolving ileus vs obstruction - ngt reinserted yesterday.  - wbc stable 12.3 (12.4)  - npo/ivf, continue ngt  - updated son via phone  - lovenox for dvt ppx  - will d/w Dr Valadez re/ possible further imaging  Dayana Couch MD  Colon and Rectal Surgery Fellow    Please page the colon & rectal surgery on-call provider with any questions or concerns.  Office: [phone]  Colorectal Surgery Staff:  I have seen and examined the patient. I agree with the above documentation and plan of the fellow/PA above with the following additions/chages:    Dayday states that he is feeling okay.  He is passing gas but not having any more bowel movements.  It is noted in the nursing record that he had 2 bowel movements but his current nurse is not sure about this.  He has minimal abdominal pain.  He feels uncomfortable from his NG tube.    He is afebrile.  He is mildly tachycardic.  He is on room air.  His abdomen is softly distended.  His incisions are clean, dry, and intact.  He has no signs of general or focal peritonitis.    His white blood cell count is mildly elevated at 12.3.  His creatinine is 1.08.    Based on the fact that we are a week out, I ordered a CT scan of the abdomen and pelvis this  morning.  I have personally reviewed the scan.  This demonstrated an intact anastomosis with no evidence of anastomotic leak.  He has an ileus versus obstruction.  There is a reported transition point in the anterior abdomen.  I can see the area that they are talking about.      Assessment plan: 58-year-old male postoperative day #7 status post extended left colectomy for colon cancer.    Based on the imaging, I am not convinced that this is a true bowel obstruction rather than ileus.  We discussed the options of taking him back to the operating room now versus trying to wait this out.  Given his body habitus, this would certainly require a very large laparotomy making a difficult recovery.  I would like to give him another day of decompression to see where we end up tomorrow.  On the CT scan, even his stomach and the first and second portions of his duodenum were still very distended so the NG tube is really not functioning optimally.  I resecured the NG tube, had it advanced so it was more in the stomach, and made sure that the sump was working so we will reevaluate tomorrow.  In the meantime, I am going to have him start TPN through PICC line.  I have spoken to his mother Chrissy about this to give her an update as well.    John Paul Valadez MD MBA  [facility]  Office: [phone]  7/10/2023 2:24 PM

## 2023-07-11 ENCOUNTER — APPOINTMENT (OUTPATIENT)
Dept: RADIOLOGY | Facility: HOSPITAL | Age: 58
DRG: 330 | End: 2023-07-11
Attending: COLON & RECTAL SURGERY
Payer: COMMERCIAL

## 2023-07-11 ENCOUNTER — TELEPHONE (OUTPATIENT)
Dept: FAMILY MEDICINE | Facility: CLINIC | Age: 58
End: 2023-07-11

## 2023-07-11 ENCOUNTER — APPOINTMENT (OUTPATIENT)
Dept: OCCUPATIONAL THERAPY | Facility: HOSPITAL | Age: 58
DRG: 330 | End: 2023-07-11
Attending: COLON & RECTAL SURGERY
Payer: COMMERCIAL

## 2023-07-11 ENCOUNTER — APPOINTMENT (OUTPATIENT)
Dept: PHYSICAL THERAPY | Facility: HOSPITAL | Age: 58
DRG: 330 | End: 2023-07-11
Attending: COLON & RECTAL SURGERY
Payer: COMMERCIAL

## 2023-07-11 LAB
ALBUMIN SERPL BCG-MCNC: 2.9 G/DL (ref 3.5–5.2)
ALBUMIN UR-MCNC: 100 MG/DL
ALP SERPL-CCNC: 99 U/L (ref 40–129)
ALT SERPL W P-5'-P-CCNC: 27 U/L (ref 0–70)
ANION GAP SERPL CALCULATED.3IONS-SCNC: 10 MMOL/L (ref 7–15)
APPEARANCE UR: CLEAR
AST SERPL W P-5'-P-CCNC: 26 U/L (ref 0–45)
BILIRUB DIRECT SERPL-MCNC: 0.4 MG/DL (ref 0–0.3)
BILIRUB SERPL-MCNC: 1 MG/DL
BILIRUB UR QL STRIP: NEGATIVE
BUN SERPL-MCNC: 17 MG/DL (ref 8–23)
CALCIUM SERPL-MCNC: 8.9 MG/DL (ref 8.6–10)
CHLORIDE SERPL-SCNC: 102 MMOL/L (ref 98–107)
COLOR UR AUTO: YELLOW
CREAT SERPL-MCNC: 1.05 MG/DL (ref 0.67–1.17)
DEPRECATED HCO3 PLAS-SCNC: 24 MMOL/L (ref 22–29)
ERYTHROCYTE [DISTWIDTH] IN BLOOD BY AUTOMATED COUNT: 13.9 % (ref 10–15)
GFR SERPL CREATININE-BSD FRML MDRD: 82 ML/MIN/1.73M2
GLUCOSE BLDC GLUCOMTR-MCNC: 126 MG/DL (ref 70–99)
GLUCOSE BLDC GLUCOMTR-MCNC: 128 MG/DL (ref 70–99)
GLUCOSE BLDC GLUCOMTR-MCNC: 141 MG/DL (ref 70–99)
GLUCOSE SERPL-MCNC: 122 MG/DL (ref 70–99)
GLUCOSE UR STRIP-MCNC: NEGATIVE MG/DL
HCT VFR BLD AUTO: 38.1 % (ref 40–53)
HGB BLD-MCNC: 12 G/DL (ref 13.3–17.7)
HGB UR QL STRIP: NEGATIVE
HYALINE CASTS: 1 /LPF
INR PPP: 1.37 (ref 0.85–1.15)
KETONES UR STRIP-MCNC: NEGATIVE MG/DL
LEUKOCYTE ESTERASE UR QL STRIP: NEGATIVE
MAGNESIUM SERPL-MCNC: 2.3 MG/DL (ref 1.7–2.3)
MCH RBC QN AUTO: 29.2 PG (ref 26.5–33)
MCHC RBC AUTO-ENTMCNC: 31.5 G/DL (ref 31.5–36.5)
MCV RBC AUTO: 93 FL (ref 78–100)
MUCOUS THREADS #/AREA URNS LPF: PRESENT /LPF
NITRATE UR QL: NEGATIVE
PH UR STRIP: 7.5 [PH] (ref 5–7)
PHOSPHATE SERPL-MCNC: 3 MG/DL (ref 2.5–4.5)
PLATELET # BLD AUTO: 252 10E3/UL (ref 150–450)
POTASSIUM SERPL-SCNC: 4.8 MMOL/L (ref 3.4–5.3)
PREALB SERPL IA-MCNC: 7 MG/DL (ref 15–45)
PROT SERPL-MCNC: 6.4 G/DL (ref 6.4–8.3)
RBC # BLD AUTO: 4.11 10E6/UL (ref 4.4–5.9)
RBC URINE: 1 /HPF
SODIUM SERPL-SCNC: 136 MMOL/L (ref 136–145)
SP GR UR STRIP: 1.03 (ref 1–1.03)
SQUAMOUS EPITHELIAL: <1 /HPF
UROBILINOGEN UR STRIP-MCNC: >12 MG/DL
WBC # BLD AUTO: 14.1 10E3/UL (ref 4–11)
WBC URINE: 3 /HPF

## 2023-07-11 PROCEDURE — 36415 COLL VENOUS BLD VENIPUNCTURE: CPT | Performed by: COLON & RECTAL SURGERY

## 2023-07-11 PROCEDURE — 258N000002 HC RX IP 258 OP 250: Performed by: COLON & RECTAL SURGERY

## 2023-07-11 PROCEDURE — 250N000011 HC RX IP 250 OP 636: Mod: JZ | Performed by: COLON & RECTAL SURGERY

## 2023-07-11 PROCEDURE — 97110 THERAPEUTIC EXERCISES: CPT | Mod: GO

## 2023-07-11 PROCEDURE — 85027 COMPLETE CBC AUTOMATED: CPT | Performed by: COLON & RECTAL SURGERY

## 2023-07-11 PROCEDURE — 81001 URINALYSIS AUTO W/SCOPE: CPT | Performed by: COLON & RECTAL SURGERY

## 2023-07-11 PROCEDURE — 71045 X-RAY EXAM CHEST 1 VIEW: CPT

## 2023-07-11 PROCEDURE — 120N000001 HC R&B MED SURG/OB

## 2023-07-11 PROCEDURE — 85610 PROTHROMBIN TIME: CPT | Performed by: COLON & RECTAL SURGERY

## 2023-07-11 PROCEDURE — 258N000003 HC RX IP 258 OP 636: Performed by: COLON & RECTAL SURGERY

## 2023-07-11 PROCEDURE — 84100 ASSAY OF PHOSPHORUS: CPT | Performed by: COLON & RECTAL SURGERY

## 2023-07-11 PROCEDURE — 80053 COMPREHEN METABOLIC PANEL: CPT | Performed by: COLON & RECTAL SURGERY

## 2023-07-11 PROCEDURE — 82248 BILIRUBIN DIRECT: CPT | Performed by: COLON & RECTAL SURGERY

## 2023-07-11 PROCEDURE — 250N000013 HC RX MED GY IP 250 OP 250 PS 637

## 2023-07-11 PROCEDURE — 97535 SELF CARE MNGMENT TRAINING: CPT | Mod: GO

## 2023-07-11 PROCEDURE — B4185 PARENTERAL SOL 10 GM LIPIDS: HCPCS | Mod: JZ | Performed by: COLON & RECTAL SURGERY

## 2023-07-11 PROCEDURE — 83735 ASSAY OF MAGNESIUM: CPT | Performed by: COLON & RECTAL SURGERY

## 2023-07-11 PROCEDURE — 97116 GAIT TRAINING THERAPY: CPT | Mod: GP

## 2023-07-11 PROCEDURE — 87040 BLOOD CULTURE FOR BACTERIA: CPT | Performed by: COLON & RECTAL SURGERY

## 2023-07-11 PROCEDURE — 250N000009 HC RX 250: Mod: JZ | Performed by: COLON & RECTAL SURGERY

## 2023-07-11 PROCEDURE — 84134 ASSAY OF PREALBUMIN: CPT | Performed by: COLON & RECTAL SURGERY

## 2023-07-11 RX ORDER — SODIUM CHLORIDE 450 MG/100ML
INJECTION, SOLUTION INTRAVENOUS CONTINUOUS
Status: ACTIVE | OUTPATIENT
Start: 2023-07-11 | End: 2023-07-11

## 2023-07-11 RX ORDER — SODIUM CHLORIDE 450 MG/100ML
INJECTION, SOLUTION INTRAVENOUS CONTINUOUS
Status: DISCONTINUED | OUTPATIENT
Start: 2023-07-11 | End: 2023-07-18

## 2023-07-11 RX ORDER — ENOXAPARIN SODIUM 100 MG/ML
40 INJECTION SUBCUTANEOUS EVERY 12 HOURS
Status: DISCONTINUED | OUTPATIENT
Start: 2023-07-11 | End: 2023-07-13

## 2023-07-11 RX ADMIN — ARIPIPRAZOLE 30 MG: 15 TABLET ORAL at 21:45

## 2023-07-11 RX ADMIN — ASCORBIC ACID, VITAMIN A PALMITATE, CHOLECALCIFEROL, THIAMINE HYDROCHLORIDE, RIBOFLAVIN-5 PHOSPHATE SODIUM, PYRIDOXINE HYDROCHLORIDE, NIACINAMIDE, DEXPANTHENOL, ALPHA-TOCOPHEROL ACETATE, VITAMIN K1, FOLIC ACID, BIOTIN, CYANOCOBALAMIN: 200; 3300; 200; 6; 3.6; 6; 40; 15; 10; 150; 600; 60; 5 INJECTION, SOLUTION INTRAVENOUS at 19:14

## 2023-07-11 RX ADMIN — ACETAMINOPHEN 650 MG: 325 TABLET ORAL at 21:45

## 2023-07-11 RX ADMIN — HYDROXYZINE HYDROCHLORIDE 50 MG: 50 TABLET ORAL at 21:45

## 2023-07-11 RX ADMIN — GABAPENTIN 100 MG: 100 CAPSULE ORAL at 21:45

## 2023-07-11 RX ADMIN — DOXAZOSIN 2 MG: 1 TABLET ORAL at 21:44

## 2023-07-11 RX ADMIN — SODIUM CHLORIDE: 4.5 INJECTION, SOLUTION INTRAVENOUS at 12:11

## 2023-07-11 RX ADMIN — ACETAMINOPHEN 650 MG: 325 TABLET ORAL at 17:14

## 2023-07-11 RX ADMIN — FLUTICASONE PROPIONATE 2 SPRAY: 50 SPRAY, METERED NASAL at 08:54

## 2023-07-11 RX ADMIN — ENOXAPARIN SODIUM 40 MG: 40 INJECTION SUBCUTANEOUS at 08:54

## 2023-07-11 RX ADMIN — GABAPENTIN 100 MG: 100 CAPSULE ORAL at 08:55

## 2023-07-11 RX ADMIN — MELOXICAM 7.5 MG: 7.5 TABLET ORAL at 08:56

## 2023-07-11 RX ADMIN — MIRABEGRON 50 MG: 25 TABLET, FILM COATED, EXTENDED RELEASE ORAL at 17:14

## 2023-07-11 RX ADMIN — LORAZEPAM 0.5 MG: 0.5 TABLET ORAL at 21:45

## 2023-07-11 RX ADMIN — OLIVE OIL AND SOYBEAN OIL 250 ML: 16; 4 INJECTION, EMULSION INTRAVENOUS at 19:21

## 2023-07-11 RX ADMIN — LORAZEPAM 0.5 MG: 0.5 TABLET ORAL at 08:55

## 2023-07-11 RX ADMIN — FLUOXETINE 60 MG: 20 CAPSULE ORAL at 08:55

## 2023-07-11 RX ADMIN — GABAPENTIN 100 MG: 100 CAPSULE ORAL at 17:14

## 2023-07-11 RX ADMIN — DOXEPIN 3 MG: 3 TABLET, FILM COATED ORAL at 21:46

## 2023-07-11 RX ADMIN — MELOXICAM 7.5 MG: 7.5 TABLET ORAL at 17:14

## 2023-07-11 RX ADMIN — SODIUM CHLORIDE, POTASSIUM CHLORIDE, SODIUM LACTATE AND CALCIUM CHLORIDE 1000 ML: 600; 310; 30; 20 INJECTION, SOLUTION INTRAVENOUS at 09:43

## 2023-07-11 RX ADMIN — Medication 81 MG: at 08:55

## 2023-07-11 RX ADMIN — POTASSIUM CHLORIDE: 2 INJECTION, SOLUTION, CONCENTRATE INTRAVENOUS at 06:20

## 2023-07-11 RX ADMIN — ENOXAPARIN SODIUM 40 MG: 40 INJECTION SUBCUTANEOUS at 21:44

## 2023-07-11 ASSESSMENT — ACTIVITIES OF DAILY LIVING (ADL)
ADLS_ACUITY_SCORE: 32
ADLS_ACUITY_SCORE: 32
ADLS_ACUITY_SCORE: 29
ADLS_ACUITY_SCORE: 29
ADLS_ACUITY_SCORE: 32
ADLS_ACUITY_SCORE: 32
ADLS_ACUITY_SCORE: 29
ADLS_ACUITY_SCORE: 32

## 2023-07-11 NOTE — TELEPHONE ENCOUNTER
Home Care is calling regarding an established patient with [facility].       Requesting orders from: Cheryl Parikh  Provider is following patient: No       Orders Requested    Physical Therapy  Request for initial certification (first set of orders)   Frequency: Initial evaluation    Occupational Therapy  Request for initial certification (first set of orders)   Frequency: Initial evaluation    Chantelle with [facility] calling. States that patient is currently at [facility]. Will likely discharge today or tmrw. Wanting confirmation from PCP if able to follow patient for HC. Per chart review, patient has not been seen by PCP since Feb 2022.    Information was gathered and will be sent to provider to confirm provider will be following patient.  RN will contact Home Care with information after provider review.  Confirmed ok to leave a detailed message with call back.  Contact information confirmed and updated as needed.    Alphonso Rice RN

## 2023-07-11 NOTE — PROGRESS NOTES
Care Management Follow Up    Length of Stay (days): 8    Expected Discharge Date: 07/12/2023     Concerns to be Addressed:  Medical progression  Patient plan of care discussed at interdisciplinary rounds: Yes    Anticipated Discharge Disposition:  Group home with home care     Anticipated Discharge Services:   Group home with home care  Anticipated Discharge DME:  TBD    Patient/family educated on Medicare website which has current facility and service quality ratings:  NA  Education Provided on the Discharge Plan:  Per team  Patient/Family in Agreement with the Plan:  yes    Referrals Placed by CM/SW:  Home care  Private pay costs discussed: NA    Additional Information:  Pt is a 58 year old male with colon cancer who underwent a left colectomy.     Pt continues to need a NG and and is running fevers.  Plan is to return to group home with home care on discharge.  Mother would like a care conference with  and hospital staff closer to discharge..    CM will continue to follow.    Diann Solis RN

## 2023-07-11 NOTE — PLAN OF CARE
Problem: Plan of Care - These are the overarching goals to be used throughout the patient stay.    Goal: Optimal Comfort and Wellbeing  Outcome: Progressing  Intervention: Provide Person-Centered Care  Recent Flowsheet Documentation  Taken 7/11/2023 1100 by Tania Valle, RN  Trust Relationship/Rapport:   care explained   choices provided   questions answered     Problem: Parenteral Nutrition  Goal: Effective Intravenous Nutrition Therapy Delivery  Outcome: Progressing  Intervention: Optimize Intravenous Nutrition Delivery  Recent Flowsheet Documentation  Taken 7/11/2023 1100 by Tania Valle, RN  Medication Review/Management: medications reviewed   Goal Outcome Evaluation:    Pt is NPO except for meds. Received LR 1 Liter Bolus. TPN @35 ml/hr and  NS @ 90 ml/hr.  NG tube to LIS.  Pt had therapies and a lots visitors.  Blood cultures and urine sent.  Been up in recliner most of day shift.

## 2023-07-11 NOTE — TELEPHONE ENCOUNTER
Called and left a detailed message for Chantelle at [facility] with VO.    Ryan Chance RN     [facility]

## 2023-07-11 NOTE — PHARMACY-CONSULT NOTE
"Pharmacy Note: Parenteral Nutrition (PN) Management    Pharmacist consulted to dose PN for Dayday Silva, a 58 year old male.    Subjective:    The patient is a new PN start.    The patient was started on PN in the hospital on 7/10/23.    Indication for PN therapy: bowel obstruction    Inadequate nutrition anticipated for > 7 days.     Enteral nutrition contraindicated due to: small bowel obstruction.      Social History     Tobacco Use     Smoking status: Former     Packs/day: 1.00     Years: 32.00     Pack years: 32.00     Types: Cigarettes     Start date: 1984     Quit date: 2016     Years since quittin.5     Smokeless tobacco: Former   Vaping Use     Vaping Use: Never used   Substance Use Topics     Alcohol use: Yes     Comment: occasional     Drug use: No     Objective:    Ht Readings from Last 1 Encounters:   22 1.651 m (5' 5\")     Wt Readings from Last 1 Encounters:   23 116.2 kg (256 lb 2.8 oz)       Body mass index is 42.63 kg/m .    Patient Vitals for the past 96 hrs:   Weight   23 0930 116.2 kg (256 lb 2.8 oz)       Labs:  Last 3 days:  Recent Labs     23  0955 23  1658 07/10/23  0626 23  0621 23  1008   NA  --   --  135* 136  --    POTASSIUM 4.5  --  4.7 4.8  --    CHLORIDE  --   --  102 102  --    CO2  --   --  23 24  --    BUN  --   --  20.4 17.0  --    CR  --   --  1.08 1.05  --    REX  --   --  9.1 8.9  --    MAG 1.4* 2.6* 2.5* 2.3  --    PHOS  --   --   --  3.0  --    PROTTOTAL  --   --   --  6.4  --    ALBUMIN  --   --   --  2.9*  --    HGB  --   --  12.7*  --  12.0*   HCT  --   --  40.7  --  38.1*   PLT  --   --  239  --  252   BILITOTAL  --   --   --  1.0  --    AST  --   --   --  26  --    ALT  --   --   --  27  --    ALKPHOS  --   --   --  99  --    INR  --   --   --  1.37*  --        Glucose (past 48 hours):   Recent Labs     07/10/23  0626 07/10/23  1757 23  0051 23  0621   * 121* 141* 122*       Intake/Output (last 24 " hours): I/O last 3 completed shifts:  In: 1827 [I.V.:1827]  Out: 2200 [Urine:50; Emesis/NG output:2150]    Estimated CrCl: Estimated Creatinine Clearance: 89.4 mL/min (based on SCr of 1.05 mg/dL).    Assessment:    Continue patient on PN therapy as a continuous central therapy.     Given the patient's current condition/oral intake, PN is still indicated.    Lab results reviewed: 7/11/23. Labs are within normal range. Potassium trending up, 4.8 mmol/L today.   Maintenance fluids order for dextrose 5% and 0.45% NaCl with 40 mEq potassium chloride/L @ 125 mL/hr. Patient receiving 120 mEq potassium over 24 hours at this rate.  Maintenance fluids changed to 0.45% NaCl, and rate to be adjusted to coincide with ordered total fluid volume of 125 mL/hr per colorectal surgery.  Will continue with Clinimix E 5/15 for today and reassess need to change to custom daily.    Plan:  1. Rate of PN: 95 mL/hr (increased from 35 mL/hr), to start tonight at 2000. Maintenance IV fluid rate will be adjusted appropriately to meet the total maximum IV fluids rate as ordered by provider.  2. Formula: Clinimix E 5/15    Amino Acids 114 grams    Dextrose 342 grams    Sodium 79.8 mEq/day    Potassium 68.4 mEq/day    Calcium 10.26 mEq/day    Magnesium 11.4 mEq/day    Phosphorus 34.2 mMol/day    Chloride: Acetate Ratio 1:2    Standard Multivitamins w/Vitamin K    Trace Elements  3. Fat Emulsion: 20%, 250 mL IV daily.  4. Check labs per TPN protocol.  5. Pharmacist will continue to follow the patient's lab results, clinical status and blood glucose results and make adjustments as appropriate.    Thank you for the consult.  Hina Tony RPH  7/11/2023 11:50 AM

## 2023-07-11 NOTE — PROGRESS NOTES
"CLINICAL NUTRITION SERVICES - NUTRITION SUPPORT NOTE     Nutrition Prescription    RECOMMENDATIONS FOR MDs/PROVIDERS TO ORDER:  Change to IV maintenance fluids with no dextrose and decrease volume at 8 PM when new tpn bag is hung.    Malnutrition Status:    Not noted    Recommendations already ordered by Registered Dietitian (RD):   Increase Clinimix formula 5/153 to 95 ml/hr continuous with 250 ml 20% daily lipid.  (Goal)  Provides: 2280 mL, 2119 calories, 114 gm AA, 342 gm Dextrose, 50 gm lipid    Future/Additional Recommendations:  Monitor TPN tolerance     REASON FOR ASSESSMENT  Dayday Silva is a/an 58 year old male assessed by the dietitian for LOS and Provider Order - Pharmacy/Nutrition to start and manage TPN for Bowel obstruction.     CURRENT NUTRITION ORDERS  Diet: NPO day 8  D5% with 1/2 NS and potassium infusing at 125 ml/hr continuous = 3000 ml fluid, 150 g carb, 510 kcals.    LABS  FSBG 141     MEDICATIONS  reviewed  IV fluids    ANTHROPOMETRICS  Height: 5' 5\"  Most Recent Weight: 116.8 kg (257 lb 9.6 oz)  (7/4) No new weights  BMI: Obesity Grade III BMI >40  Weight History:   Wt Readings from Last 6 Encounters:   07/04/23 116.8 kg (257 lb 9.6 oz)   12/01/22 117.9 kg (260 lb)   02/15/22 112.2 kg (247 lb 4 oz)   03/09/20 103.9 kg (229 lb 2 oz)   01/09/20 103.7 kg (228 lb 9.6 oz)   10/16/19 103.4 kg (228 lb)     Dosing Weight: 75.5 kg    ASSESSED NUTRITION NEEDS  Estimated Energy Needs: 6680-4769 kcals/day (25 - 30 kcals/kg)  Justification: Obese and Post-op  Estimated Protein Needs: + grams protein/day (1.2 - 1.5 grams of pro/kg)  Justification: Post-op  Estimated Fluid Needs: 2668-9594 mL/day (30 - 35 mL/kg)   Justification: Maintenance    PHYSICAL FINDINGS  See malnutrition section below.  Surgical incisions   NG 2150 ml out in 24 /hrs.  Low urine output    7/10 abdominal CT IMPRESSION:   1.  Surgical changes of a left hemicolectomy. There are a few tiny locules of pericolonic extraluminal air " along the resection site which is compatible with recent postsurgical change. No evidence of abscess or free air. Small amount of free fluid within   the abdomen and pelvis is also compatible with recent postsurgical change.  2.  Multiple loops of dilated fluid and air-filled small bowel within the left abdomen. There is an abrupt point of transition of the dilated bowel within the anterior mid abdomen. Findings are suggestive of a bowel obstruction.  3.  Cholelithiasis.     Malnutrition Diagnosis: Patient does not meet two of the above criteria necessary for diagnosing malnutrition (7/10/23)    NUTRITION DIAGNOSIS  Inadequate oral intake related to acute illness as evidenced by NPO x 7 days, need for TPN.      INTERVENTIONS  Implementation  PN - increase to goal rate    Recommend no dextrose in IV maintenance fluids and decreasing rate with increase in TPN tonight.    Goals  Tolerate TPN - progressing  Meet nutrition needs - progressing  Electrolytes WNL - progressing     Monitoring/Evaluation  Progress toward goals will be monitored and evaluated per protocol.

## 2023-07-11 NOTE — TELEPHONE ENCOUNTER
I have known this patient for quite some time.  I am happy to see him in follow-up with home care.  It would be nice to have him schedule a hospital follow-up visit.

## 2023-07-11 NOTE — PLAN OF CARE
Problem: Plan of Care - These are the overarching goals to be used throughout the patient stay.    Goal: Optimal Comfort and Wellbeing  Outcome: Progressing  Intervention: Monitor Pain and Promote Comfort  Recent Flowsheet Documentation  Taken 7/11/2023 0040 by Kunal Fonseca RN  Pain Management Interventions:   pillow support provided   repositioned   rest     Problem: Surgery Nonspecified  Goal: Effective Oxygenation and Ventilation  Outcome: Progressing  Intervention: Optimize Oxygenation and Ventilation  Recent Flowsheet Documentation  Taken 7/11/2023 0040 by Kunal Fonseca, RN  Head of Bed (HOB) Positioning: HOB at 20-30 degrees     Problem: Obstructive Sleep Apnea Risk or Actual Comorbidity Management  Goal: Unobstructed Breathing During Sleep  Outcome: Progressing  Intervention: Monitor and Manage Obstructive Sleep Apnea  Recent Flowsheet Documentation  Taken 7/11/2023 0040 by Kunal Fonseca, RN  Medication Review/Management:   medications reviewed   high-risk medications identified   Goal Outcome Evaluation:    VSS except HR and temp. Gave PRN tylenol for temp of 102.6; temp reduced to 99.1. HR peaked at 127 bpm. Provider notified of elevated HR and temp, instructed to continue monitoring.  A/Ox4. No nausea reported. Reported moderate pain in left abdomen relieved by rest and repositioning. NG tube in place on intermittent suction with 800 ml output. Slept between cares.     Kunal Fonseca RN

## 2023-07-11 NOTE — PROGRESS NOTES
Colon and Rectal Surgery  Daily Progress Note    Subjective  Reports feeling much better today compared to yesterday. Denies nausea or bloating at this time. Mild pain but overall controlled. NGT with 2.1L bilious output in last 24 hrs. Denies passing flatus or stools. Ambulating frequently. Fever to 102.6F last night and HR peaked to 127 bpm. Tylenol given and afebrile this AM, HR still elevated to 114 bpm, remains on 3L NC. Denies SOB or dyspnea.     Objective  Intake/Output last 24 hrs:    Intake/Output Summary (Last 24 hours) at 7/11/2023 0904  Last data filed at 7/11/2023 0700  Gross per 24 hour   Intake 3374 ml   Output 2150 ml   Net 1224 ml     Temp:  [98.4  F (36.9  C)-102.6  F (39.2  C)] 98.4  F (36.9  C)  Pulse:  [113-127] 114  Resp:  [18-20] 18  BP: (104-128)/(54-69) 114/56  SpO2:  [91 %-96 %] 96 %   BMI 42    Physical Exam:  General: awake, alert, sitting in chair, in no acute distress  Head: normocephalic, atraumatic  Respiratory: non-labored breathing, NC in place  Abdomen: soft, obese, nontenderr, distended although slightly improved, no rebound guarding, or signs of peritonitis              Incisions: clean, dry and intact, ecchymosis most prominent surrounding left port incision   Skin: No rashes or lesions  Musculoskeletal: moves all four extremities equally  Psychological: alert and oriented, answers questions appropriately    Pertinent Labs  Lab Results: personally reviewed.  Lab Results   Component Value Date     07/11/2023     07/10/2023     07/08/2023    CO2 24 07/11/2023    CO2 23 07/10/2023    CO2 26 07/08/2023    CO2 24 02/15/2022    CO2 26 09/21/2020    CO2 24 01/09/2020    BUN 17.0 07/11/2023    BUN 20.4 07/10/2023    BUN 13.0 07/08/2023    BUN 12 02/15/2022    BUN 12 09/21/2020    BUN 10 01/09/2020     Lab Results   Component Value Date    WBC 12.3 07/10/2023    WBC 12.4 07/07/2023    WBC 14.0 07/06/2023    HGB 12.7 07/10/2023    HGB 12.7 07/07/2023    HGB 13.7  07/06/2023    HCT 40.7 07/10/2023    HCT 41.3 07/07/2023    HCT 42.7 07/06/2023    MCV 91 07/10/2023    MCV 93 07/07/2023    MCV 90 07/06/2023     07/10/2023     07/07/2023     07/06/2023       Assessment/Plan: This is a 58 year old male POD #8 s/p extended left colectomy for colon cancer.    Cr 1.05  Albumin 2.9    -CXR, UA, blood cultures, and CBC today for work-up of fever spike   -1L bolus this AM  -NGT/NPO  -Continue TPN and mIVF, total fluid rate to 125mL/hr  -Lovenox dvt ppx  -OOB/ambulate  -Encourage IS at least 10x qhr  -Monitor I&Os  -AROBF  -Serial abdominal exams, if any changes to abdominal exam may need to consider repeat CT chest/abdomen/pelvis to evaluate for possible leak  -Recheck labs tomorrow    Discussed with Dr. Rory Lloyd PA-C  [facility]  [phone]..............................main    Colorectal Surgery Staff:  I have seen and examined the patient. I agree with the above documentation and plan of the fellow/PA above with the following additions/chages:    Dayday doesn't feel well today. He reports no more gas or stool. He reports left sided abdominal pain which is something he has not complained of before. He is not sure when it started, maybe last night.    He is now afebrile, but he had a fever to 102 last night which is new. He is now afebrile again. He continues to be mildly tachycardic with a spike in HR with his fever. No hypotension.     Ins and outs are difficult to record secondary to his urinary incontinence (pre-existing condition) but his NG put out 2.1L of bilious fluid.    He is only able to pull 500mL on the incentive spirometer.    Abdomen is soft, less distended than yesterday, but mild focal tenderness on the left lateral side. Incisions look good. No focal or generalized peritonitis.    WBC increased again to 14.1    A/P: 58M POD 8 s/p extended left colectomy for colon cancer w/ ileus vs early obstruction.   - Fever  work-up ordered. Pending results from BCx. UCx does not show any sign of infection. He just had a CT abd/pelvis yesterday which did not show any intra-abdominal problem, but it is possible he could have developed it since then. CXR shows low volumes and he is not doing much in terms of IS (we worked on this) so another possibility is atelectasis/PNA.   - We will continue the course for now (NPO/IVF/NGT/TPN) and plan to re-scan him chest/abd/pelvis in the morning to give his kidneys 48 hours recovery between contrast loads. However, if he does become febrile overnight, or demonstrate worsening vital signs, we will empirically start him on broad spectrum antibiotics as well as do the scan overnight.   - I have updated his mother Chrissy and attempted to call his son Dion.     MD YONATHAN UngerA  [facility]  Office: [phone]  7/11/2023 5:04 PM

## 2023-07-12 ENCOUNTER — APPOINTMENT (OUTPATIENT)
Dept: PHYSICAL THERAPY | Facility: HOSPITAL | Age: 58
DRG: 330 | End: 2023-07-12
Attending: COLON & RECTAL SURGERY
Payer: COMMERCIAL

## 2023-07-12 ENCOUNTER — APPOINTMENT (OUTPATIENT)
Dept: RADIOLOGY | Facility: HOSPITAL | Age: 58
DRG: 330 | End: 2023-07-12
Attending: COLON & RECTAL SURGERY
Payer: COMMERCIAL

## 2023-07-12 ENCOUNTER — APPOINTMENT (OUTPATIENT)
Dept: OCCUPATIONAL THERAPY | Facility: HOSPITAL | Age: 58
DRG: 330 | End: 2023-07-12
Attending: COLON & RECTAL SURGERY
Payer: COMMERCIAL

## 2023-07-12 ENCOUNTER — APPOINTMENT (OUTPATIENT)
Dept: CT IMAGING | Facility: HOSPITAL | Age: 58
DRG: 330 | End: 2023-07-12
Attending: COLON & RECTAL SURGERY
Payer: COMMERCIAL

## 2023-07-12 LAB
ANION GAP SERPL CALCULATED.3IONS-SCNC: 9 MMOL/L (ref 7–15)
BUN SERPL-MCNC: 18.7 MG/DL (ref 8–23)
CALCIUM SERPL-MCNC: 8.6 MG/DL (ref 8.6–10)
CHLORIDE SERPL-SCNC: 99 MMOL/L (ref 98–107)
CREAT SERPL-MCNC: 0.99 MG/DL (ref 0.67–1.17)
DEPRECATED HCO3 PLAS-SCNC: 29 MMOL/L (ref 22–29)
ERYTHROCYTE [DISTWIDTH] IN BLOOD BY AUTOMATED COUNT: 13.8 % (ref 10–15)
GFR SERPL CREATININE-BSD FRML MDRD: 88 ML/MIN/1.73M2
GLUCOSE BLDC GLUCOMTR-MCNC: 131 MG/DL (ref 70–99)
GLUCOSE BLDC GLUCOMTR-MCNC: 137 MG/DL (ref 70–99)
GLUCOSE SERPL-MCNC: 141 MG/DL (ref 70–99)
HCT VFR BLD AUTO: 36.5 % (ref 40–53)
HGB BLD-MCNC: 11.5 G/DL (ref 13.3–17.7)
MAGNESIUM SERPL-MCNC: 2.2 MG/DL (ref 1.7–2.3)
MCH RBC QN AUTO: 28.8 PG (ref 26.5–33)
MCHC RBC AUTO-ENTMCNC: 31.5 G/DL (ref 31.5–36.5)
MCV RBC AUTO: 91 FL (ref 78–100)
PHOSPHATE SERPL-MCNC: 3.8 MG/DL (ref 2.5–4.5)
PLATELET # BLD AUTO: 236 10E3/UL (ref 150–450)
POTASSIUM SERPL-SCNC: 4 MMOL/L (ref 3.4–5.3)
RBC # BLD AUTO: 4 10E6/UL (ref 4.4–5.9)
SODIUM SERPL-SCNC: 137 MMOL/L (ref 136–145)
WBC # BLD AUTO: 12.5 10E3/UL (ref 4–11)

## 2023-07-12 PROCEDURE — 74018 RADEX ABDOMEN 1 VIEW: CPT

## 2023-07-12 PROCEDURE — 85027 COMPLETE CBC AUTOMATED: CPT | Performed by: COLON & RECTAL SURGERY

## 2023-07-12 PROCEDURE — 999N000157 HC STATISTIC RCP TIME EA 10 MIN

## 2023-07-12 PROCEDURE — 250N000011 HC RX IP 250 OP 636: Mod: JZ | Performed by: COLON & RECTAL SURGERY

## 2023-07-12 PROCEDURE — 120N000001 HC R&B MED SURG/OB

## 2023-07-12 PROCEDURE — 83735 ASSAY OF MAGNESIUM: CPT | Performed by: COLON & RECTAL SURGERY

## 2023-07-12 PROCEDURE — 74177 CT ABD & PELVIS W/CONTRAST: CPT

## 2023-07-12 PROCEDURE — 84100 ASSAY OF PHOSPHORUS: CPT | Performed by: COLON & RECTAL SURGERY

## 2023-07-12 PROCEDURE — 80048 BASIC METABOLIC PNL TOTAL CA: CPT | Performed by: COLON & RECTAL SURGERY

## 2023-07-12 PROCEDURE — 258N000002 HC RX IP 258 OP 250: Performed by: COLON & RECTAL SURGERY

## 2023-07-12 PROCEDURE — 97116 GAIT TRAINING THERAPY: CPT | Mod: GP

## 2023-07-12 PROCEDURE — 250N000013 HC RX MED GY IP 250 OP 250 PS 637

## 2023-07-12 PROCEDURE — B4185 PARENTERAL SOL 10 GM LIPIDS: HCPCS | Mod: JZ | Performed by: COLON & RECTAL SURGERY

## 2023-07-12 PROCEDURE — 97535 SELF CARE MNGMENT TRAINING: CPT | Mod: GO

## 2023-07-12 PROCEDURE — 97110 THERAPEUTIC EXERCISES: CPT | Mod: GP

## 2023-07-12 PROCEDURE — 250N000009 HC RX 250: Performed by: COLON & RECTAL SURGERY

## 2023-07-12 RX ORDER — IOPAMIDOL 755 MG/ML
90 INJECTION, SOLUTION INTRAVASCULAR ONCE
Status: COMPLETED | OUTPATIENT
Start: 2023-07-12 | End: 2023-07-12

## 2023-07-12 RX ADMIN — DOXAZOSIN 2 MG: 1 TABLET ORAL at 20:36

## 2023-07-12 RX ADMIN — MELOXICAM 7.5 MG: 7.5 TABLET ORAL at 08:28

## 2023-07-12 RX ADMIN — GABAPENTIN 100 MG: 100 CAPSULE ORAL at 08:28

## 2023-07-12 RX ADMIN — MIRABEGRON 50 MG: 25 TABLET, FILM COATED, EXTENDED RELEASE ORAL at 18:02

## 2023-07-12 RX ADMIN — FLUOXETINE 60 MG: 20 CAPSULE ORAL at 08:28

## 2023-07-12 RX ADMIN — ARIPIPRAZOLE 30 MG: 15 TABLET ORAL at 22:07

## 2023-07-12 RX ADMIN — OLIVE OIL AND SOYBEAN OIL 250 ML: 16; 4 INJECTION, EMULSION INTRAVENOUS at 20:37

## 2023-07-12 RX ADMIN — SODIUM CHLORIDE: 4.5 INJECTION, SOLUTION INTRAVENOUS at 08:22

## 2023-07-12 RX ADMIN — LORAZEPAM 0.5 MG: 0.5 TABLET ORAL at 20:36

## 2023-07-12 RX ADMIN — ENOXAPARIN SODIUM 40 MG: 40 INJECTION SUBCUTANEOUS at 08:27

## 2023-07-12 RX ADMIN — FLUTICASONE PROPIONATE 2 SPRAY: 50 SPRAY, METERED NASAL at 08:29

## 2023-07-12 RX ADMIN — GABAPENTIN 100 MG: 100 CAPSULE ORAL at 22:07

## 2023-07-12 RX ADMIN — LORAZEPAM 0.5 MG: 0.5 TABLET ORAL at 08:28

## 2023-07-12 RX ADMIN — HYDROXYZINE HYDROCHLORIDE 50 MG: 50 TABLET ORAL at 22:07

## 2023-07-12 RX ADMIN — GABAPENTIN 100 MG: 100 CAPSULE ORAL at 18:03

## 2023-07-12 RX ADMIN — MELOXICAM 7.5 MG: 7.5 TABLET ORAL at 18:02

## 2023-07-12 RX ADMIN — ENOXAPARIN SODIUM 40 MG: 40 INJECTION SUBCUTANEOUS at 20:37

## 2023-07-12 RX ADMIN — Medication 81 MG: at 08:28

## 2023-07-12 RX ADMIN — ASCORBIC ACID, VITAMIN A PALMITATE, CHOLECALCIFEROL, THIAMINE HYDROCHLORIDE, RIBOFLAVIN-5 PHOSPHATE SODIUM, PYRIDOXINE HYDROCHLORIDE, NIACINAMIDE, DEXPANTHENOL, ALPHA-TOCOPHEROL ACETATE, VITAMIN K1, FOLIC ACID, BIOTIN, CYANOCOBALAMIN: 200; 3300; 200; 6; 3.6; 6; 40; 15; 10; 150; 600; 60; 5 INJECTION, SOLUTION INTRAVENOUS at 16:59

## 2023-07-12 RX ADMIN — IOPAMIDOL 90 ML: 755 INJECTION, SOLUTION INTRAVENOUS at 07:24

## 2023-07-12 RX ADMIN — DOXEPIN 3 MG: 3 TABLET, FILM COATED ORAL at 22:07

## 2023-07-12 ASSESSMENT — ACTIVITIES OF DAILY LIVING (ADL)
ADLS_ACUITY_SCORE: 34
ADLS_ACUITY_SCORE: 34
ADLS_ACUITY_SCORE: 30
ADLS_ACUITY_SCORE: 34
ADLS_ACUITY_SCORE: 32
ADLS_ACUITY_SCORE: 34

## 2023-07-12 NOTE — PROGRESS NOTES
Colon and Rectal Surgery  Daily Progress Note    Subjective  Feels well this AM. Minimal abdominal pain in LLQ, upon questioning says pain is more superficial and points to ecchymosis ad incisional area. Denies nausea and NGT with 3.1L. Does report passing some flatus, no BMs.     CT without findings of leak and stable hematoma in LLQ abdominal wall. Small bowel distension decreased in caliber. No findings of pneumonia, persistent scattered atelectasis. NGT in GE junction, nurse did advance NGT this AM with 300cc of immediate bilious return.     Objective  Intake/Output last 24 hrs:    Intake/Output Summary (Last 24 hours) at 7/11/2023 0904  Last data filed at 7/11/2023 0700  Gross per 24 hour   Intake 3374 ml   Output 2150 ml   Net 1224 ml     Temp:  [97.5  F (36.4  C)-98.5  F (36.9  C)] 97.6  F (36.4  C)  Pulse:  [108-119] 116  Resp:  [20-22] 20  BP: (116-140)/(57-90) 136/76  SpO2:  [93 %-95 %] 93 %   BMI 42    Physical Exam:  General: awake, alert, laying in bed, in no acute distress  Head: normocephalic, atraumatic  Respiratory: non-labored breathing, NC in place  Abdomen: soft, obese, mild tenderness near ecchymosis and central abdomen, softly distended. No rebound guarding, or signs of peritonitis              Incisions: clean, dry and intact, ecchymosis most prominent surrounding left port incision,    Skin: No rashes or lesions  Musculoskeletal: moves all four extremities equally  Psychological: alert and oriented, answers questions appropriately    Pertinent Labs  Lab Results: personally reviewed.  Lab Results   Component Value Date     07/11/2023     07/10/2023     07/08/2023    CO2 24 07/11/2023    CO2 23 07/10/2023    CO2 26 07/08/2023    CO2 24 02/15/2022    CO2 26 09/21/2020    CO2 24 01/09/2020    BUN 17.0 07/11/2023    BUN 20.4 07/10/2023    BUN 13.0 07/08/2023    BUN 12 02/15/2022    BUN 12 09/21/2020    BUN 10 01/09/2020     Lab Results   Component Value Date    WBC 12.3  07/10/2023    WBC 12.4 07/07/2023    WBC 14.0 07/06/2023    HGB 12.7 07/10/2023    HGB 12.7 07/07/2023    HGB 13.7 07/06/2023    HCT 40.7 07/10/2023    HCT 41.3 07/07/2023    HCT 42.7 07/06/2023    MCV 91 07/10/2023    MCV 93 07/07/2023    MCV 90 07/06/2023     07/10/2023     07/07/2023     07/06/2023       Assessment/Plan: This is a 58 year old male POD #9 s/p extended left colectomy for colon cancer. Now with ileus versus early obstruction    Cr 0.99  WBC 12.5 (4.1)  Hgb 11.5 (12)  Afebrile,  and remains on 3L NC  Fever work-up negative without growth on blood cultures    CT without findings of leak and improved small bowel distension. Stable LLQ abdominal hematoma and persistent atelectasis.     -AXR to confirm NGT placement after advancement; continue to secure NGT well  -NGT/NPO  -Continue TPN and mIVF, total fluid rate to 125mL/hr  -Lovenox dvt ppx  -OOB/ambulate  -Encourage IS at least 10x qhr  -Monitor I&Os  -AROBF  -Recheck labs tomorrow    Discussed with Dr. Rory Lloyd PACristelaC  [facility]  [phone]..............................main

## 2023-07-12 NOTE — PLAN OF CARE
Problem: Parenteral Nutrition  Goal: Effective Intravenous Nutrition Therapy Delivery  Outcome: Progressing  Intervention: Optimize Intravenous Nutrition Delivery  Recent Flowsheet Documentation  Taken 7/12/2023 1100 by Tania Valle RN  Medication Review/Management: medications reviewed   Goal Outcome Evaluation:    Pt had been up to recliner couple of times during the day. Had therapies.  Had couple of visitors from the group home.  TPN and IVFs running.  NG tube was advanced early in the morning and pt had Xray to verify placement.  NG tube is to LIS.  Voided in the urinal.  No c/o pain or nausea.  Will cont to monitor.

## 2023-07-12 NOTE — PHARMACY-CONSULT NOTE
"Pharmacy Note: Parenteral Nutrition (PN) Management    Pharmacist consulted to dose PN for Dayday Silva, a 58 year old male.    Subjective:    The patient is a new PN start.    The patient was started on PN in the hospital on 7/10/23.    Indication for PN therapy: bowel obstruction    Inadequate nutrition anticipated for > 7 days.     Enteral nutrition contraindicated due to: small bowel obstruction.      Social History     Tobacco Use     Smoking status: Former     Packs/day: 1.00     Years: 32.00     Pack years: 32.00     Types: Cigarettes     Start date: 1984     Quit date: 2016     Years since quittin.5     Smokeless tobacco: Former   Vaping Use     Vaping Use: Never used   Substance Use Topics     Alcohol use: Yes     Comment: occasional     Drug use: No     Objective:    Ht Readings from Last 1 Encounters:   22 1.651 m (5' 5\")     Wt Readings from Last 1 Encounters:   23 116.2 kg (256 lb 2.8 oz)       Body mass index is 42.63 kg/m .    Patient Vitals for the past 96 hrs:   Weight   23 0930 116.2 kg (256 lb 2.8 oz)       Labs:  Last 3 days:  Recent Labs     23  0955 23  1658 07/10/23  0626 23  0621 23  1008 23  0618   NA  --   --  135* 136  --  137   POTASSIUM 4.5  --  4.7 4.8  --  4.0   CHLORIDE  --   --  102 102  --  99   CO2  --   --  23 24  --  29   BUN  --   --  20.4 17.0  --  18.7   CR  --   --  1.08 1.05  --  0.99   REX  --   --  9.1 8.9  --  8.6   MAG 1.4* 2.6* 2.5* 2.3  --  2.2   PHOS  --   --   --  3.0  --  3.8   PROTTOTAL  --   --   --  6.4  --   --    ALBUMIN  --   --   --  2.9*  --   --    PREALB  --   --   --  7*  --   --    HGB  --   --  12.7*  --  12.0* 11.5*   HCT  --   --  40.7  --  38.1* 36.5*   PLT  --   --  239  --  252 236   BILITOTAL  --   --   --  1.0  --   --    AST  --   --   --  26  --   --    ALT  --   --   --  27  --   --    ALKPHOS  --   --   --  99  --   --    INR  --   --   --  1.37*  --   --        Glucose ( " hours):   Recent Labs     07/10/23  1757 07/11/23  0051 07/11/23  0621 07/11/23  1602 07/11/23  2205 07/11/23  2355 07/12/23  0618   * 141* 122* 126* 128* 137* 141*       Intake/Output (last 24 hours): I/O last 3 completed shifts:  In: 2309 [I.V.:991]  Out: 3575 [Urine:450; Emesis/NG output:3125]    Estimated CrCl: Estimated Creatinine Clearance: 94.8 mL/min (based on SCr of 0.99 mg/dL).    Assessment:    Continue patient on PN therapy as a continuous central therapy.     Given the patient's current condition/oral intake, PN is still indicated.    Lab results reviewed: 7/12/23. Labs look good today, all within normal range. CO2 trending up to 29 mmol/L, monitor.   1/2NS maintenance fluids running at 30 ml/hr.   Continue Clinimix E  5/15 TPN today and reassess need to change to custom daily.       Plan:  1. Rate of PN: 95 mL/hr. Maintenance IV fluid rate will be adjusted appropriately to meet the total maximum IV fluids rate as ordered by provider.  2. Formula:     Amino Acids 114 grams    Dextrose 342 grams    Sodium 79.8 mEq/day    Potassium 68.4 mEq/day    Calcium 10.26 mEq/day    Magnesium 11.4 mEq/day    Phosphorus 34.2 mMol/day    Chloride: Acetate Ratio 1:2    Standard Multivitamins w/Vitamin K    Trace Elements  3. Fat Emulsion: 20%, 250 mL IV  daily  4. Check TPN panel labs tomorrow.  5. Pharmacist will continue to follow the patient's lab results, clinical status and blood glucose results and make adjustments as appropriate.    Thank you for the consult.  DARIUS LORENZ [facility]  7/12/2023 9:09 AM

## 2023-07-12 NOTE — PLAN OF CARE
Problem: Plan of Care - These are the overarching goals to be used throughout the patient stay.    Goal: Optimal Comfort and Wellbeing  Outcome: Progressing  Intervention: Provide Person-Centered Care  Recent Flowsheet Documentation  Taken 7/11/2023 2345 by Kunal Fonseca RN  Trust Relationship/Rapport:    care explained    thoughts/feelings acknowledged     Problem: Surgery Nonspecified  Goal: Optimal Pain Control and Function  Outcome: Progressing  Goal: Effective Oxygenation and Ventilation  Outcome: Progressing  Intervention: Optimize Oxygenation and Ventilation  Recent Flowsheet Documentation  Taken 7/11/2023 2345 by Kunal Fonseca, RN  Head of Bed (HOB) Positioning: HOB at 20-30 degrees   Goal Outcome Evaluation:    A/Ox4. VSS. No pain or nausea reported. On 3L O2 via NC. NG in place on low intermittent suction. NG started to come out while patient was in bed x2, re-secured NG and new landmark was assessed at 63 cm on second occurence. Ambulating in hallways and to bathroom with assist of 1, gait belt, and walker. Slept several hours overnight, spent a significant amount of time up in chair.     Kunal Fonseca RN

## 2023-07-12 NOTE — PROGRESS NOTES
"CLINICAL NUTRITION SERVICES - NUTRITION SUPPORT NOTE     Nutrition Prescription    RECOMMENDATIONS FOR MDs/PROVIDERS TO ORDER:    Malnutrition Status:    Not noted    Recommendations already ordered by Registered Dietitian (RD):  Continue current TPN regimen    Future/Additional Recommendations:  Monitor TPN tolerance, hydration with high outputs     REASON FOR ASSESSMENT  Dayday Silva is a/an 58 year old male assessed by the dietitian for LOS and Provider Order - Pharmacy/Nutrition to start and manage TPN for Bowel obstruction.     CURRENT NUTRITION ORDERS  Diet: NPO day 9  TPN:Clinimix formula 5/15e to 95 ml/hr continuous with 250 ml 20% daily lipid.  (Goal)  Provides: 2280 mL, 2119 calories, 114 gm AA, 342 gm Dextrose, 50 gm lipid    0.45% NaCl at 30 mL/hr    Daily fluids total 125 mL/hr per Surgery.    LABS  FSBG 141     MEDICATIONS  reviewed  IV fluids    ANTHROPOMETRICS  Height: 5' 5\"  Admit weight: 7/3 121.1 kg (267 lb)  standing scale - not sure if accurate as weight the next day is down 10 lbs.  Most Recent Weight: 116.2 kg (256 lb 2.8 oz) 7/11/23  standing scale  116.8 kg (257 lb 9.6 oz)  7/4/23 standing scale  BMI: Obesity Grade III BMI >40    Dosing Weight: 75.5 kg    ASSESSED NUTRITION NEEDS  Estimated Energy Needs: 3840-7996 kcals/day (25 - 30 kcals/kg)  Justification: Obese and Post-op  Estimated Protein Needs: + grams protein/day (1.2 - 1.5 grams of pro/kg)  Justification: Post-op  Estimated Fluid Needs: 6461-9554 mL/day (30 - 35 mL/kg)   Justification: Maintenance    PHYSICAL FINDINGS  See malnutrition section below.  Surgical incisions   NG 3125 ml out in 24 /hrs.  Low urine output 450 mL  No stool since surgery    7/10 abdominal CT IMPRESSION:   1.  Surgical changes of a left hemicolectomy. There are a few tiny locules of pericolonic extraluminal air along the resection site which is compatible with recent postsurgical change. No evidence of abscess or free air. Small amount of free fluid " within   the abdomen and pelvis is also compatible with recent postsurgical change.  2.  Multiple loops of dilated fluid and air-filled small bowel within the left abdomen. There is an abrupt point of transition of the dilated bowel within the anterior mid abdomen. Findings are suggestive of a bowel obstruction.  3.  Cholelithiasis.     Malnutrition Diagnosis: Patient does not meet two of the above criteria necessary for diagnosing malnutrition (7/10/23)    NUTRITION DIAGNOSIS  Inadequate oral intake related to acute illness as evidenced by NPO x 7 days, need for TPN.      INTERVENTIONS  Implementation  PN - continue    Goals  Tolerate TPN - met  Meet nutrition needs - met - may need additional hydration if continues high outputs  Electrolytes WNL - met     Monitoring/Evaluation  Progress toward goals will be monitored and evaluated per protocol.

## 2023-07-12 NOTE — PROGRESS NOTES
"Care Management Follow Up    Length of Stay (days): 9    Expected Discharge Date: 07/14/2023     Concerns to be Addressed:  Medical progression  Patient plan of care discussed at interdisciplinary rounds: Yes    Anticipated Discharge Disposition:  Group home with home care     Anticipated Discharge Services:   Home care - RN/PT/OT  Anticipated Discharge DME:  TBD    Patient/family educated on Medicare website which has current facility and service quality ratings:  NA  Education Provided on the Discharge Plan:  Per team  Patient/Family in Agreement with the Plan:  yes    Referrals Placed by CM/SW:  Home care  Private pay costs discussed: NA    Additional Information:  Chart reviewed.    Accepted to [facility] for - RN/PT/OT services. Plan to return to group home when patient is medically ready.     Social HX: \"Pt has lived in a group home in [facility] for the past eight years. Pt reports he is independent with mobility but uses a cane or walker as needed. Pt reports that he has a home CPAP machine that he wears at night. Pt stated that his mother Chrissy is his guardian.\"    CM will continue to follow care progression and aide in discharge planning as needed.      Janel Yates RN      "

## 2023-07-12 NOTE — PLAN OF CARE
Problem: Plan of Care - These are the overarching goals to be used throughout the patient stay.    Goal: Optimal Comfort and Wellbeing  Outcome: Progressing  Intervention: Monitor Pain and Promote Comfort  Recent Flowsheet Documentation  Taken 7/11/2023 2140 by Alvina Meza RN  Pain Management Interventions: medication (see MAR)  Taken 7/11/2023 1900 by Alvina Meza RN  Pain Management Interventions:    distraction    emotional support  Taken 7/11/2023 1625 by Alvina Meza RN  Pain Management Interventions: distraction  Taken 7/11/2023 1536 by Alvina Meza RN  Pain Management Interventions: cold applied  Intervention: Provide Person-Centered Care  Recent Flowsheet Documentation  Taken 7/11/2023 1527 by Alvina Meza RN  Trust Relationship/Rapport:    care explained    choices provided    emotional support provided    empathic listening provided    questions answered    questions encouraged    reassurance provided    thoughts/feelings acknowledged     Problem: Parenteral Nutrition  Goal: Effective Intravenous Nutrition Therapy Delivery  Intervention: Optimize Intravenous Nutrition Delivery  Recent Flowsheet Documentation  Taken 7/11/2023 1527 by Alvina Meza RN  Medication Review/Management: medications reviewed   Goal Outcome Evaluation:       Patient AAO. C/O some abdominal pain, rating it 2/10, ice applied and prn tylenol given, with some relief. Tolerating 3 L O2 NC. TPN, lipids, and 0.45 NS infusing. NG on low intermittent suction with over 800 ml output. On Mg and K protocol, recheck both in a.m. Call light within reach. Can make needs known.

## 2023-07-13 ENCOUNTER — APPOINTMENT (OUTPATIENT)
Dept: PHYSICAL THERAPY | Facility: HOSPITAL | Age: 58
DRG: 330 | End: 2023-07-13
Attending: COLON & RECTAL SURGERY
Payer: COMMERCIAL

## 2023-07-13 ENCOUNTER — APPOINTMENT (OUTPATIENT)
Dept: OCCUPATIONAL THERAPY | Facility: HOSPITAL | Age: 58
DRG: 330 | End: 2023-07-13
Attending: COLON & RECTAL SURGERY
Payer: COMMERCIAL

## 2023-07-13 LAB
ANION GAP SERPL CALCULATED.3IONS-SCNC: 9 MMOL/L (ref 7–15)
BUN SERPL-MCNC: 18.3 MG/DL (ref 8–23)
CALCIUM SERPL-MCNC: 8.7 MG/DL (ref 8.6–10)
CHLORIDE SERPL-SCNC: 96 MMOL/L (ref 98–107)
CREAT SERPL-MCNC: 0.98 MG/DL (ref 0.67–1.17)
DEPRECATED HCO3 PLAS-SCNC: 32 MMOL/L (ref 22–29)
ERYTHROCYTE [DISTWIDTH] IN BLOOD BY AUTOMATED COUNT: 13.8 % (ref 10–15)
GFR SERPL CREATININE-BSD FRML MDRD: 89 ML/MIN/1.73M2
GLUCOSE BLDC GLUCOMTR-MCNC: 122 MG/DL (ref 70–99)
GLUCOSE BLDC GLUCOMTR-MCNC: 125 MG/DL (ref 70–99)
GLUCOSE BLDC GLUCOMTR-MCNC: 131 MG/DL (ref 70–99)
GLUCOSE BLDC GLUCOMTR-MCNC: 136 MG/DL (ref 70–99)
GLUCOSE SERPL-MCNC: 128 MG/DL (ref 70–99)
HCT VFR BLD AUTO: 35.5 % (ref 40–53)
HGB BLD-MCNC: 10.9 G/DL (ref 13.3–17.7)
MAGNESIUM SERPL-MCNC: 2.3 MG/DL (ref 1.7–2.3)
MCH RBC QN AUTO: 28.2 PG (ref 26.5–33)
MCHC RBC AUTO-ENTMCNC: 30.7 G/DL (ref 31.5–36.5)
MCV RBC AUTO: 92 FL (ref 78–100)
PHOSPHATE SERPL-MCNC: 4.2 MG/DL (ref 2.5–4.5)
PLATELET # BLD AUTO: 265 10E3/UL (ref 150–450)
POTASSIUM SERPL-SCNC: 3.6 MMOL/L (ref 3.4–5.3)
RBC # BLD AUTO: 3.87 10E6/UL (ref 4.4–5.9)
SODIUM SERPL-SCNC: 137 MMOL/L (ref 136–145)
WBC # BLD AUTO: 12.6 10E3/UL (ref 4–11)

## 2023-07-13 PROCEDURE — 97110 THERAPEUTIC EXERCISES: CPT | Mod: GP

## 2023-07-13 PROCEDURE — 83735 ASSAY OF MAGNESIUM: CPT | Performed by: COLON & RECTAL SURGERY

## 2023-07-13 PROCEDURE — 97535 SELF CARE MNGMENT TRAINING: CPT | Mod: GO

## 2023-07-13 PROCEDURE — 250N000011 HC RX IP 250 OP 636: Mod: JZ | Performed by: COLON & RECTAL SURGERY

## 2023-07-13 PROCEDURE — 250N000013 HC RX MED GY IP 250 OP 250 PS 637

## 2023-07-13 PROCEDURE — 84100 ASSAY OF PHOSPHORUS: CPT | Performed by: COLON & RECTAL SURGERY

## 2023-07-13 PROCEDURE — 258N000003 HC RX IP 258 OP 636

## 2023-07-13 PROCEDURE — B4185 PARENTERAL SOL 10 GM LIPIDS: HCPCS | Mod: JZ | Performed by: COLON & RECTAL SURGERY

## 2023-07-13 PROCEDURE — C9113 INJ PANTOPRAZOLE SODIUM, VIA: HCPCS | Mod: JZ | Performed by: COLON & RECTAL SURGERY

## 2023-07-13 PROCEDURE — 120N000001 HC R&B MED SURG/OB

## 2023-07-13 PROCEDURE — 250N000009 HC RX 250: Mod: JZ | Performed by: COLON & RECTAL SURGERY

## 2023-07-13 PROCEDURE — 80048 BASIC METABOLIC PNL TOTAL CA: CPT | Performed by: COLON & RECTAL SURGERY

## 2023-07-13 PROCEDURE — 85027 COMPLETE CBC AUTOMATED: CPT | Performed by: COLON & RECTAL SURGERY

## 2023-07-13 RX ORDER — ENOXAPARIN SODIUM 100 MG/ML
40 INJECTION SUBCUTANEOUS EVERY 24 HOURS
Status: DISCONTINUED | OUTPATIENT
Start: 2023-07-14 | End: 2023-07-21 | Stop reason: HOSPADM

## 2023-07-13 RX ADMIN — GABAPENTIN 100 MG: 100 CAPSULE ORAL at 22:12

## 2023-07-13 RX ADMIN — FLUTICASONE PROPIONATE 2 SPRAY: 50 SPRAY, METERED NASAL at 09:13

## 2023-07-13 RX ADMIN — MIRABEGRON 50 MG: 25 TABLET, FILM COATED, EXTENDED RELEASE ORAL at 18:13

## 2023-07-13 RX ADMIN — LORAZEPAM 0.5 MG: 0.5 TABLET ORAL at 22:12

## 2023-07-13 RX ADMIN — DOXAZOSIN 2 MG: 1 TABLET ORAL at 22:12

## 2023-07-13 RX ADMIN — Medication 81 MG: at 08:32

## 2023-07-13 RX ADMIN — OLIVE OIL AND SOYBEAN OIL 250 ML: 16; 4 INJECTION, EMULSION INTRAVENOUS at 22:14

## 2023-07-13 RX ADMIN — MAGNESIUM SULFATE HEPTAHYDRATE: 500 INJECTION, SOLUTION INTRAMUSCULAR; INTRAVENOUS at 22:13

## 2023-07-13 RX ADMIN — FLUOXETINE 60 MG: 20 CAPSULE ORAL at 08:36

## 2023-07-13 RX ADMIN — MELOXICAM 7.5 MG: 7.5 TABLET ORAL at 08:32

## 2023-07-13 RX ADMIN — HYDROXYZINE HYDROCHLORIDE 50 MG: 50 TABLET ORAL at 22:12

## 2023-07-13 RX ADMIN — LORAZEPAM 0.5 MG: 0.5 TABLET ORAL at 08:34

## 2023-07-13 RX ADMIN — GABAPENTIN 100 MG: 100 CAPSULE ORAL at 18:13

## 2023-07-13 RX ADMIN — ASCORBIC ACID, VITAMIN A PALMITATE, CHOLECALCIFEROL, THIAMINE HYDROCHLORIDE, RIBOFLAVIN-5 PHOSPHATE SODIUM, PYRIDOXINE HYDROCHLORIDE, NIACINAMIDE, DEXPANTHENOL, ALPHA-TOCOPHEROL ACETATE, VITAMIN K1, FOLIC ACID, BIOTIN, CYANOCOBALAMIN: 200; 3300; 200; 6; 3.6; 6; 40; 15; 10; 150; 600; 60; 5 INJECTION, SOLUTION INTRAVENOUS at 14:15

## 2023-07-13 RX ADMIN — DOXEPIN 3 MG: 3 TABLET, FILM COATED ORAL at 22:13

## 2023-07-13 RX ADMIN — PANTOPRAZOLE SODIUM 40 MG: 40 INJECTION, POWDER, FOR SOLUTION INTRAVENOUS at 22:13

## 2023-07-13 RX ADMIN — GABAPENTIN 100 MG: 100 CAPSULE ORAL at 08:32

## 2023-07-13 RX ADMIN — ENOXAPARIN SODIUM 40 MG: 40 INJECTION SUBCUTANEOUS at 09:15

## 2023-07-13 RX ADMIN — MELOXICAM 7.5 MG: 7.5 TABLET ORAL at 18:13

## 2023-07-13 RX ADMIN — ARIPIPRAZOLE 30 MG: 15 TABLET ORAL at 22:13

## 2023-07-13 RX ADMIN — SODIUM CHLORIDE, POTASSIUM CHLORIDE, SODIUM LACTATE AND CALCIUM CHLORIDE 500 ML: 600; 310; 30; 20 INJECTION, SOLUTION INTRAVENOUS at 09:07

## 2023-07-13 ASSESSMENT — ACTIVITIES OF DAILY LIVING (ADL)
ADLS_ACUITY_SCORE: 36
ADLS_ACUITY_SCORE: 32
ADLS_ACUITY_SCORE: 34
ADLS_ACUITY_SCORE: 32
ADLS_ACUITY_SCORE: 28
ADLS_ACUITY_SCORE: 36
ADLS_ACUITY_SCORE: 32
ADLS_ACUITY_SCORE: 28

## 2023-07-13 NOTE — PLAN OF CARE
Problem: Parenteral Nutrition  Goal: Effective Intravenous Nutrition Therapy Delivery  Outcome: Progressing  Intervention: Optimize Intravenous Nutrition Delivery  Recent Flowsheet Documentation  Taken 7/13/2023 0230 by Ashley Costa RN  Medication Review/Management: medications reviewed  Taken 7/12/2023 2030 by Ashley Costa RN  Medication Review/Management: medications reviewed    Pt denies pain or nausea. Slept on and off overnight. NG to low continuous suction per order. TPN, lipids, and IVFs to 125mL total per order. Pt on 3L of O2 overnight. VSS.    Ashley Costa, RACHEL

## 2023-07-13 NOTE — PROGRESS NOTES
Colon and Rectal Surgery  Daily Progress Note    Subjective  Sitting up in chair this morning. Denies pain. Has some irritation in throat/nose from his NGT. Denies nausea and NGT with 3.2L. Does report passing some flatus, no BMs. Walked 2x in garcia yesterday. Afebrile, P 101, /62, CPAP at night.     Objective  Intake/Output last 24 hrs:    Intake/Output Summary (Last 24 hours) at 7/11/2023 0904  Last data filed at 7/11/2023 0700  Gross per 24 hour   Intake 3374 ml   Output 2150 ml   Net 1224 ml     Temp:  [98  F (36.7  C)-99  F (37.2  C)] 99  F (37.2  C)  Pulse:  [101-106] 101  Resp:  [18] 18  BP: (127-150)/(62-70) 127/62  SpO2:  [91 %-98 %] 91 %   BMI 42    Physical Exam:  General: awake, alert, sitting in chair, in no acute distress. NGT with bilious output  Head: normocephalic, atraumatic  Respiratory: non-labored breathing  Abdomen: soft, obese, mild tenderness near ecchymosis and central abdomen, softly distended. No rebound guarding, or signs of peritonitis              Incisions: clean, dry and intact, ecchymosis most prominent surrounding left port incision,    Skin: No rashes or lesions  Musculoskeletal: moves all four extremities equally  Psychological: alert and oriented, answers questions appropriately    Pertinent Labs  Lab Results: personally reviewed.  Lab Results   Component Value Date     07/11/2023     07/10/2023     07/08/2023    CO2 24 07/11/2023    CO2 23 07/10/2023    CO2 26 07/08/2023    CO2 24 02/15/2022    CO2 26 09/21/2020    CO2 24 01/09/2020    BUN 17.0 07/11/2023    BUN 20.4 07/10/2023    BUN 13.0 07/08/2023    BUN 12 02/15/2022    BUN 12 09/21/2020    BUN 10 01/09/2020     Lab Results   Component Value Date    WBC 12.3 07/10/2023    WBC 12.4 07/07/2023    WBC 14.0 07/06/2023    HGB 12.7 07/10/2023    HGB 12.7 07/07/2023    HGB 13.7 07/06/2023    HCT 40.7 07/10/2023    HCT 41.3 07/07/2023    HCT 42.7 07/06/2023    MCV 91 07/10/2023    MCV 93 07/07/2023    MCV 90  07/06/2023     07/10/2023     07/07/2023     07/06/2023       Assessment/Plan: This is a 58 year old male POD #10 s/p extended left colectomy for colon cancer. Now with ileus versus early obstruction. CT without findings of leak and improved small bowel distension. Stable LLQ abdominal hematoma and persistent atelectasis.    Cr 0.98  WBC 12.6 (12.5)  Hgb 10.9 (11.5)       -NGT/NPO  -Continue TPN and mIVF, total fluid rate to 125mL/hr  - 500cc bolus LR this morniing  -Lovenox dvt ppx  -OOB/ambulate  -Encourage IS at least 10x qhr  -Monitor I&Os  -BMP tomorrow AM  -AROBF    Discussed with Dr. Rory Bowman, PA-C  [facility]  [phone]..............................main    Colorectal Surgery Staff:  I have seen and examined the patient. I agree with the above documentation and plan of the fellow/PA above with the following additions/chages:    Feeling better. Passing gas. A little liquid stool.     HR improved. No more fevers. Back on oxygen. >3L NG. Now looks more bloody.  Abd soft, less distended, incisions c/d/i, some ecchymosis.     Labs reviewed.    I agree with the plan as above which was formulated with me.   Added Protonix for bloody NG output.  Lovenox decreased to 40mg daily.  Continue current care plan.  Discussed with Dayday's mother Chrissy. We also discussed the small lung nodule seen on CT and the fact that it will need follow up. I asked for his (Dayday's) son Dion's number, but they did not have it off hand and it is not in the computer. I have asked that someone call in and let the nurses know Dion's number so it can go in the chart so I can update him.     John Paul Valadez MD MBA  [facility]  Office: [phone]  7/13/2023 5:47 PM

## 2023-07-13 NOTE — PLAN OF CARE
Goal Outcome Evaluation:      Plan of Care Reviewed With: patient    Denies pain.   NG in place to suction. Hypoactive bowel sounds. Passing flatus.   Ambulated in garcia and in room - A1, walker, gait belt.    Reviewed plan of care.   Alvina Noriega RN  [phone]

## 2023-07-13 NOTE — PHARMACY-CONSULT NOTE
"Pharmacy Note: Parenteral Nutrition (PN) Management    Pharmacist consulted to dose PN for Dayday Silva, a 58 year old male by Dr. John Paul Valadez    Subjective:    The patient is a new PN start.    The patient was started on PN in the hospital on 7/10/23.    Indication for PN therapy: bowel obstruction    Inadequate nutrition anticipated for > 7 days.     Enteral nutrition contraindicated due to: small bowel obstruction.        Social History     Tobacco Use     Smoking status: Former     Packs/day: 1.00     Years: 32.00     Pack years: 32.00     Types: Cigarettes     Start date: 1984     Quit date: 2016     Years since quittin.5     Smokeless tobacco: Former   Vaping Use     Vaping Use: Never used   Substance Use Topics     Alcohol use: Yes     Comment: occasional     Drug use: No     Objective:    Ht Readings from Last 1 Encounters:   22 1.651 m (5' 5\")     Wt Readings from Last 1 Encounters:   23 116.2 kg (256 lb 2.8 oz)       Body mass index is 42.63 kg/m .    Patient Vitals for the past 96 hrs:   Weight   23 0930 116.2 kg (256 lb 2.8 oz)       Labs:  Last 3 days:  Recent Labs     23  0621 23  1008 23  0618 23  0556     --  137 137   POTASSIUM 4.8  --  4.0 3.6   CHLORIDE 102  --  99 96*   CO2 24  --  29 32*   BUN 17.0  --  18.7 18.3   CR 1.05  --  0.99 0.98   REX 8.9  --  8.6 8.7   MAG 2.3  --  2.2 2.3   PHOS 3.0  --  3.8 4.2   PROTTOTAL 6.4  --   --   --    ALBUMIN 2.9*  --   --   --    PREALB 7*  --   --   --    HGB  --  12.0* 11.5* 10.9*   HCT  --  38.1* 36.5* 35.5*   PLT  --  252 236 265   BILITOTAL 1.0  --   --   --    AST 26  --   --   --    ALT 27  --   --   --    ALKPHOS 99  --   --   --    INR 1.37*  --   --   --        Glucose (past 48 hours):   Recent Labs     23  1602 23  2205 23  2355 23  0618 23  1816 23  0046 23  0556 23  0840   * 128* 137* 141* 131* 131* 128* 136*       Intake/Output " (last 24 hours): I/O last 3 completed shifts:  In: 5594 [I.V.:2281]  Out: 3550 [Urine:350; Emesis/NG output:3200]    Estimated CrCl: Estimated Creatinine Clearance: 95.7 mL/min (based on SCr of 0.98 mg/dL).    Assessment:    Continue patient on PN therapy as a continuous central therapy.     Given the patient's current condition/oral intake, PN is still indicated.    Lab results reviewed: Calcium, Mag, Phos, Chloride, CO2, Sodium, Potassium.   Of note: Chloride 96, CO2 32, will need to switch to custom for a 1:1 Chloride: Acetate Ratio.  Potassium dropped from 4 to 3.6 will increase to 75 mEq/day.  Phosphorus has been trending up from 3.0 to 3.8 to 4.2. Will decrease to 24 mMol/day.       Plan:  1. Rate of PN: 95 mL/hr. Maintenance IV fluid rate will be adjusted appropriately to meet the total maximum IV fluids rate as ordered by provider.  2. Formula:     Amino Acids 114 grams    Dextrose 342 grams    Sodium 80 mEq/day    Potassium 75 mEq/day    Calcium 10 mEq/day    Magnesium 11 mEq/day    Phosphorus 24 mMol/day    Chloride: Acetate Ratio 1:1    Standard Multivitamins w/Vitamin K    Trace Elements    3. Fat Emulsion: 20%, 250 mL IV daily  4. Check TPN panel labs tomorrow.  5. Pharmacist will continue to follow the patient's lab results, clinical status and blood glucose results and make adjustments as appropriate.    Thank you for the consult.  LOKESH LOPEZ [facility]  7/13/2023 10:26 AM

## 2023-07-13 NOTE — PROGRESS NOTES
"CLINICAL NUTRITION SERVICES - REASSESSMENT NOTE     Nutrition Prescription    RECOMMENDATIONS FOR MDs/PROVIDERS TO ORDER:    Recommendations already ordered by Registered Dietitian (RD):  Continue current TPN regimen  Per pharm change to custom TPN: continue same macronutrients of  D 342  @ 95 ml/hr and continue 250 ml of clinolipid daily    Future/Additional Recommendations:  Monitor TPN tolerance, labs, weight, diet adv, poc     EVALUATION OF THE PROGRESS TOWARD GOALS   Diet: NPO  Nutrition Support: TPN: clinimix: ( 5/15) @ 95 ml/hr plus 250 ml of 20% lipids daily providin ml, 2119 Calories, 114 g protein, 342 g CHO and 50 g fat per day  IVF sodium chloride 0.45% @ 30 ml/hr     NEW FINDINGS   NG to LIS    ANTHROPOMETRICS  Height: 5'5\"  Most Recent Weight: 116.2 kg (256 lb 2.8 oz)    Weight History:   Wt Readings from Last 10 Encounters:   23 116.2 kg (256 lb 2.8 oz)   22 117.9 kg (260 lb)   02/15/22 112.2 kg (247 lb 4 oz)   20 103.9 kg (229 lb 2 oz)   20 103.7 kg (228 lb 9.6 oz)   10/16/19 103.4 kg (228 lb)   19 100.2 kg (221 lb)   19 94.1 kg (207 lb 6.4 oz)   19 93.8 kg (206 lb 12.8 oz)   01/15/19 92.1 kg (203 lb 1.6 oz)     GI CONCERNS  Abdomen rounded/distended  Abdominal discomfort  Last BM 2023  I/O: 3673/2750 ( )    LABS  Reviewed: Na 137, K 3.6, Mg 2.3, phos 4.2, Glu 128    MEDICATIONS  Reviewed: doxazosin, doxepin, meloxicam, mirabegron    Malnutrition Diagnosis: Patient does not meet two of the above criteria necessary for diagnosing malnutrition    CURRENT NUTRITION DIAGNOSIS  Inadequate oral intake related to acute illness as evidenced by NPO x 7 days and need for TPN      INTERVENTIONS  Implementation  Continue current TPN regimen    Goals  Tolerate TPN-met  Meet nutritional needs =met ( continue to follow fluid needs)  Electrolytes WNL-met    Monitoring/Evaluation  Progress toward goals will be monitored and evaluated per protocol.  "

## 2023-07-14 ENCOUNTER — APPOINTMENT (OUTPATIENT)
Dept: PHYSICAL THERAPY | Facility: HOSPITAL | Age: 58
DRG: 330 | End: 2023-07-14
Attending: COLON & RECTAL SURGERY
Payer: COMMERCIAL

## 2023-07-14 ENCOUNTER — APPOINTMENT (OUTPATIENT)
Dept: OCCUPATIONAL THERAPY | Facility: HOSPITAL | Age: 58
DRG: 330 | End: 2023-07-14
Attending: COLON & RECTAL SURGERY
Payer: COMMERCIAL

## 2023-07-14 LAB
ANION GAP SERPL CALCULATED.3IONS-SCNC: 10 MMOL/L (ref 7–15)
BUN SERPL-MCNC: 16.1 MG/DL (ref 8–23)
CALCIUM SERPL-MCNC: 9.2 MG/DL (ref 8.6–10)
CHLORIDE SERPL-SCNC: 96 MMOL/L (ref 98–107)
CREAT SERPL-MCNC: 0.91 MG/DL (ref 0.67–1.17)
DEPRECATED HCO3 PLAS-SCNC: 29 MMOL/L (ref 22–29)
GFR SERPL CREATININE-BSD FRML MDRD: >90 ML/MIN/1.73M2
GLUCOSE BLDC GLUCOMTR-MCNC: 123 MG/DL (ref 70–99)
GLUCOSE BLDC GLUCOMTR-MCNC: 135 MG/DL (ref 70–99)
GLUCOSE BLDC GLUCOMTR-MCNC: 138 MG/DL (ref 70–99)
GLUCOSE SERPL-MCNC: 129 MG/DL (ref 70–99)
MAGNESIUM SERPL-MCNC: 2.2 MG/DL (ref 1.7–2.3)
PHOSPHATE SERPL-MCNC: 3.9 MG/DL (ref 2.5–4.5)
POTASSIUM SERPL-SCNC: 3.9 MMOL/L (ref 3.4–5.3)
SODIUM SERPL-SCNC: 135 MMOL/L (ref 136–145)

## 2023-07-14 PROCEDURE — 83735 ASSAY OF MAGNESIUM: CPT | Performed by: COLON & RECTAL SURGERY

## 2023-07-14 PROCEDURE — 97535 SELF CARE MNGMENT TRAINING: CPT | Mod: GO

## 2023-07-14 PROCEDURE — 97110 THERAPEUTIC EXERCISES: CPT | Mod: GO

## 2023-07-14 PROCEDURE — 250N000009 HC RX 250: Mod: JZ | Performed by: COLON & RECTAL SURGERY

## 2023-07-14 PROCEDURE — 80048 BASIC METABOLIC PNL TOTAL CA: CPT | Performed by: COLON & RECTAL SURGERY

## 2023-07-14 PROCEDURE — 250N000011 HC RX IP 250 OP 636: Mod: JZ | Performed by: COLON & RECTAL SURGERY

## 2023-07-14 PROCEDURE — 120N000001 HC R&B MED SURG/OB

## 2023-07-14 PROCEDURE — C9113 INJ PANTOPRAZOLE SODIUM, VIA: HCPCS | Mod: JZ | Performed by: COLON & RECTAL SURGERY

## 2023-07-14 PROCEDURE — 258N000003 HC RX IP 258 OP 636

## 2023-07-14 PROCEDURE — 84100 ASSAY OF PHOSPHORUS: CPT | Performed by: COLON & RECTAL SURGERY

## 2023-07-14 PROCEDURE — 250N000013 HC RX MED GY IP 250 OP 250 PS 637

## 2023-07-14 PROCEDURE — 97116 GAIT TRAINING THERAPY: CPT | Mod: GP

## 2023-07-14 PROCEDURE — B4185 PARENTERAL SOL 10 GM LIPIDS: HCPCS | Mod: JZ | Performed by: COLON & RECTAL SURGERY

## 2023-07-14 RX ADMIN — OLIVE OIL AND SOYBEAN OIL 250 ML: 16; 4 INJECTION, EMULSION INTRAVENOUS at 22:02

## 2023-07-14 RX ADMIN — SODIUM CHLORIDE, POTASSIUM CHLORIDE, SODIUM LACTATE AND CALCIUM CHLORIDE 500 ML: 600; 310; 30; 20 INJECTION, SOLUTION INTRAVENOUS at 09:52

## 2023-07-14 RX ADMIN — Medication 81 MG: at 08:30

## 2023-07-14 RX ADMIN — LORAZEPAM 0.5 MG: 0.5 TABLET ORAL at 08:30

## 2023-07-14 RX ADMIN — ENOXAPARIN SODIUM 40 MG: 40 INJECTION SUBCUTANEOUS at 08:38

## 2023-07-14 RX ADMIN — MELOXICAM 7.5 MG: 7.5 TABLET ORAL at 17:24

## 2023-07-14 RX ADMIN — GABAPENTIN 100 MG: 100 CAPSULE ORAL at 17:24

## 2023-07-14 RX ADMIN — FLUOXETINE 60 MG: 20 CAPSULE ORAL at 08:29

## 2023-07-14 RX ADMIN — GABAPENTIN 100 MG: 100 CAPSULE ORAL at 08:29

## 2023-07-14 RX ADMIN — GABAPENTIN 100 MG: 100 CAPSULE ORAL at 22:42

## 2023-07-14 RX ADMIN — MELOXICAM 7.5 MG: 7.5 TABLET ORAL at 08:30

## 2023-07-14 RX ADMIN — HYDROXYZINE HYDROCHLORIDE 50 MG: 50 TABLET ORAL at 22:42

## 2023-07-14 RX ADMIN — MAGNESIUM SULFATE HEPTAHYDRATE: 500 INJECTION, SOLUTION INTRAMUSCULAR; INTRAVENOUS at 22:00

## 2023-07-14 RX ADMIN — LORAZEPAM 0.5 MG: 0.5 TABLET ORAL at 22:42

## 2023-07-14 RX ADMIN — DOXEPIN 3 MG: 3 TABLET, FILM COATED ORAL at 22:44

## 2023-07-14 RX ADMIN — DOXAZOSIN 2 MG: 1 TABLET ORAL at 22:42

## 2023-07-14 RX ADMIN — PANTOPRAZOLE SODIUM 40 MG: 40 INJECTION, POWDER, FOR SOLUTION INTRAVENOUS at 08:34

## 2023-07-14 RX ADMIN — MIRABEGRON 50 MG: 25 TABLET, FILM COATED, EXTENDED RELEASE ORAL at 17:24

## 2023-07-14 RX ADMIN — FLUTICASONE PROPIONATE 2 SPRAY: 50 SPRAY, METERED NASAL at 08:39

## 2023-07-14 RX ADMIN — ARIPIPRAZOLE 30 MG: 15 TABLET ORAL at 22:43

## 2023-07-14 ASSESSMENT — ACTIVITIES OF DAILY LIVING (ADL)
ADLS_ACUITY_SCORE: 32
ADLS_ACUITY_SCORE: 40
ADLS_ACUITY_SCORE: 34
ADLS_ACUITY_SCORE: 32
ADLS_ACUITY_SCORE: 40
ADLS_ACUITY_SCORE: 34
ADLS_ACUITY_SCORE: 36
ADLS_ACUITY_SCORE: 34
ADLS_ACUITY_SCORE: 34
ADLS_ACUITY_SCORE: 36
ADLS_ACUITY_SCORE: 40
ADLS_ACUITY_SCORE: 36

## 2023-07-14 NOTE — PROGRESS NOTES
"Care Management Follow Up    Length of Stay (days): 11    Expected Discharge Date: 07/17/2023     Concerns to be Addressed:  Medical progression  Patient plan of care discussed at interdisciplinary rounds: Yes    Anticipated Discharge Disposition:  Group home with home care     Anticipated Discharge Services:   Home care - RN/PT/OT  Anticipated Discharge DME:  TBD    Patient/family educated on Medicare website which has current facility and service quality ratings:  NA  Education Provided on the Discharge Plan:  Per team  Patient/Family in Agreement with the Plan:  yes    Referrals Placed by CM/SW:  Home care  Private pay costs discussed: NA    Additional Information:  Chart reviewed.    Accepted to [facility] for - RN/PT/OT services. Plan to return to group home when patient is medically ready.     Social HX: \"Pt has lived in a group home in [facility] for the past eight years. Pt reports he is independent with mobility but uses a cane or walker as needed. Pt reports that he has a home CPAP machine that he wears at night. Pt stated that his mother Chrissy is his guardian.\"    CM will continue to follow care progression and aide in discharge planning as needed.      Janel Yates RN      "

## 2023-07-14 NOTE — PLAN OF CARE
Problem: Parenteral Nutrition  Goal: Effective Intravenous Nutrition Therapy Delivery  Outcome: Progressing  Intervention: Optimize Intravenous Nutrition Delivery  Recent Flowsheet Documentation  Taken 7/13/2023 2342 by Matthias Valdes, RN  Medication Review/Management: medications reviewed  Taken 7/13/2023 2120 by Matthias Valdes, RN  Medication Review/Management: medications reviewed     Problem: Surgery Nonspecified  Goal: Nausea and Vomiting Relief  Outcome: Progressing     Patient slept well overnight. Up to chair this AM. Ambulated hallways. NG to LIS, 400 mL out overnight. TPN/lipids/ 0.45% NS running per orders. Passing flatus with hypoactive bowel sounds. No stool passed overnight. VSS.    Matthias Valdes, RN

## 2023-07-14 NOTE — PLAN OF CARE
Problem: Parenteral Nutrition  Goal: Effective Intravenous Nutrition Therapy Delivery  Outcome: Progressing   Goal Outcome Evaluation:       Diet: NPO  TPN: clinimix: ( 5/15 e) @ 95 ml/hr plus 250 ml of 20% lipids daily providin ml, 2119 Calories, 114 g protein, 342 g CHO and 50 g fat per day  IVF sodium chloride 0.45% @ 30 ml/hr    Pt is now stooling and passing gas.  NGT output: 2700 mL last 24 hours    Per Surgery continue TPN over the weekend.    No changes to TPN macronutrients today.

## 2023-07-14 NOTE — PHARMACY-CONSULT NOTE
"Pharmacy Note: Parenteral Nutrition (PN) Management    Pharmacist consulted to dose PN for Dayday Silva, a 58 year old male by Dr. Valadez.    Subjective:    The patient is a new PN start.    The patient was started on PN in the hospital on 7/10/23.    Indication for PN therapy: bowel obstruction    Inadequate nutrition anticipated for > 7 days.     Enteral nutrition contraindicated due to: small bowel obstruction.      Social History     Tobacco Use     Smoking status: Former     Packs/day: 1.00     Years: 32.00     Pack years: 32.00     Types: Cigarettes     Start date: 1984     Quit date: 2016     Years since quittin.5     Smokeless tobacco: Former   Vaping Use     Vaping Use: Never used   Substance Use Topics     Alcohol use: Yes     Comment: occasional     Drug use: No     Objective:    Ht Readings from Last 1 Encounters:   22 1.651 m (5' 5\")     Wt Readings from Last 1 Encounters:   23 113 kg (249 lb 3.2 oz)       Body mass index is 41.47 kg/m .    Patient Vitals for the past 96 hrs:   Weight   23 0902 113 kg (249 lb 3.2 oz)   23 1114 114.2 kg (251 lb 11.2 oz)   23 0930 116.2 kg (256 lb 2.8 oz)       Labs:  Last 3 days:  Recent Labs     23  1008 23  0618 23  0556 23  0650   NA  --  137 137 135*   POTASSIUM  --  4.0 3.6 3.9   CHLORIDE  --  99 96* 96*   CO2  --  29 32* 29   BUN  --  18.7 18.3 16.1   CR  --  0.99 0.98 0.91   REX  --  8.6 8.7 9.2   MAG  --  2.2 2.3 2.2   PHOS  --  3.8 4.2 3.9   HGB 12.0* 11.5* 10.9*  --    HCT 38.1* 36.5* 35.5*  --     236 265  --        Glucose (past 48 hours):   Recent Labs     23  1816 23  0046 23  0556 23  0840 23  1506 23  1918 23  0215 23  0650 23  0806   * 131* 128* 136* 122* 125* 138* 129* 123*       Intake/Output (last 24 hours): I/O last 3 completed shifts:  In: 955.35   Out: 2700 [Emesis/NG output:2700]    Estimated CrCl: Estimated " Creatinine Clearance: 101.5 mL/min (based on SCr of 0.91 mg/dL).    Assessment:    Continue patient on PN therapy as a continuous central therapy.     Given the patient's current condition/oral intake, PN is still indicated.    Lab results reviewed: 7/14    Plan:  1. Rate of PN: 95 mL/hr   2. Formula:     Amino Acids 114 grams    Dextrose 342 grams    Sodium 80 mEq/day    Potassium 75 mEq/day    Calcium 10 mEq/day    Magnesium 11 mEq/day    Phosphorus 24 mMol/day    Chloride: Acetate Ratio 1:1    Standard Multivitamins w/Vitamin K    Trace Elements  3. Fat Emulsion: 20%, 250 mL IV daily  4. Check hyperal lytes labs tomorrow.  5. Pharmacist will continue to follow the patient's lab results, clinical status and blood glucose results and make adjustments as appropriate.    Thank you for the consult.  Dayday Monreal RPH  7/14/2023 9:52 AM

## 2023-07-14 NOTE — PLAN OF CARE
Problem: Surgery Nonspecified  Goal: Effective Bowel Elimination  7/13/2023 2003 by Krista Daily, RN  Outcome: Progressing  7/13/2023 2003 by Krista Daily, RN  Outcome: Progressing   Goal Outcome Evaluation:         Denies pain and nausea.   NGT to suction, good amount of brown output.   LR bolus given this morning.     TPN and IVF running for total of 125 ml/h.   Passing flatus. Up ambulating with assist x 1 w/ walker and gait belt.     Is up to 750.    Mg and K protocol to be recheck in the morning.

## 2023-07-14 NOTE — PROGRESS NOTES
"Colon and Rectal Surgery  Daily Progress Note    Subjective  Dayday says that he feels \"great\" today.  He is passing gas and had what he describes as a large liquid bowel movement this morning.  He is not having any abdominal pain.  He has been up ambulating.  He has improved his incentive spirometer to 1 L.    Objective  Intake/Output last 24 hrs:    Intake/Output Summary (Last 24 hours) at 7/14/2023 0921  Last data filed at 7/14/2023 0903  Gross per 24 hour   Intake 955.35 ml   Output 2850 ml   Net -1894.65 ml     Temp:  [98.2  F (36.8  C)-99  F (37.2  C)] 98.2  F (36.8  C)  Pulse:  [] 97  Resp:  [18-28] 22  BP: (127-135)/(59-73) 127/68  SpO2:  [90 %-92 %] 92 %    Physical Exam:  General: awake, alert,, in no acute distress  HEENT: NG tube in place with bilious output now, no more blood.  2.7 L out  Respiratory: non-labored breathing  Abdomen: Soft, certainly less distended than previously.  Still little tympanic to percussion.  No pain.  Incisions look good with a little bit of ecchymosis.  Musculoskeletal: moves all four extremities equally; no calf edema or tenderness  Psychological: alert and oriented, answers questions appropriately    Pertinent Labs  Lab Results: personally reviewed.  Lab Results   Component Value Date     07/14/2023     07/13/2023     07/12/2023    CO2 29 07/14/2023    CO2 32 07/13/2023    CO2 29 07/12/2023    CO2 24 02/15/2022    CO2 26 09/21/2020    CO2 24 01/09/2020    BUN 16.1 07/14/2023    BUN 18.3 07/13/2023    BUN 18.7 07/12/2023    BUN 12 02/15/2022    BUN 12 09/21/2020    BUN 10 01/09/2020     Lab Results   Component Value Date    WBC 12.6 07/13/2023    WBC 12.5 07/12/2023    WBC 14.1 07/11/2023    HGB 10.9 07/13/2023    HGB 11.5 07/12/2023    HGB 12.0 07/11/2023    HCT 35.5 07/13/2023    HCT 36.5 07/12/2023    HCT 38.1 07/11/2023    MCV 92 07/13/2023    MCV 91 07/12/2023    MCV 93 07/11/2023     07/13/2023     07/12/2023     07/11/2023 "       Assessment/Plan: This is a 58 year old male POD #11 s/p robotic extended left colectomy for colon cancer.  -Clinically, Dayday looks a lot better today.  He says he is feeling a lot better also.  He is passing gas and now having a bowel movement and less distended.  However, given the fact that his NG output is so high and the fact that he had a recurrence of his ileus after removing his tube last time, we are not going to make any sudden moves.  -Plan for continued IV fluids, NG tube decompression, and TPN over the weekend.  -Bolus 500 mL of LR this morning.  -No more bloody NG tube output after starting Protonix and decreasing his Lovenox to 40 mg daily.  Please do not change Lovenox dosing without directly discussing with me first.  -BMP daily while the NG tube is in place.  -Await return of bowel function.    John Paul Valadez MD, SEAN  [facility]  Office: [phone]  7/14/2023 9:21 AM

## 2023-07-14 NOTE — PLAN OF CARE
Problem: Surgery Nonspecified  Goal: Effective Bowel Elimination  Outcome: Progressing   Goal Outcome Evaluation:         Alert and oriented. Forgetful.  Denies pain, nausea and any other complain.     NG to LIS with good amount of brown output.    ml bolus given this morning.     Passing gas per pt report and had 2 small BM today.   Ambulating with assist x 1, walker and gait belt with PT/OT and floor staff.     TPN, lipids and IVF per orders.     Using IS independently. Up to 750-1000 today.     Mg and K protocol, to be recheck in the morning.

## 2023-07-15 ENCOUNTER — APPOINTMENT (OUTPATIENT)
Dept: OCCUPATIONAL THERAPY | Facility: HOSPITAL | Age: 58
DRG: 330 | End: 2023-07-15
Attending: COLON & RECTAL SURGERY
Payer: COMMERCIAL

## 2023-07-15 ENCOUNTER — APPOINTMENT (OUTPATIENT)
Dept: PHYSICAL THERAPY | Facility: HOSPITAL | Age: 58
DRG: 330 | End: 2023-07-15
Attending: COLON & RECTAL SURGERY
Payer: COMMERCIAL

## 2023-07-15 LAB
ANION GAP SERPL CALCULATED.3IONS-SCNC: 11 MMOL/L (ref 7–15)
BUN SERPL-MCNC: 14.6 MG/DL (ref 8–23)
CALCIUM SERPL-MCNC: 9 MG/DL (ref 8.6–10)
CHLORIDE SERPL-SCNC: 97 MMOL/L (ref 98–107)
CREAT SERPL-MCNC: 0.87 MG/DL (ref 0.67–1.17)
DEPRECATED HCO3 PLAS-SCNC: 25 MMOL/L (ref 22–29)
GFR SERPL CREATININE-BSD FRML MDRD: >90 ML/MIN/1.73M2
GLUCOSE BLDC GLUCOMTR-MCNC: 121 MG/DL (ref 70–99)
GLUCOSE BLDC GLUCOMTR-MCNC: 154 MG/DL (ref 70–99)
GLUCOSE SERPL-MCNC: 118 MG/DL (ref 70–99)
MAGNESIUM SERPL-MCNC: 2.1 MG/DL (ref 1.7–2.3)
POTASSIUM SERPL-SCNC: 4.1 MMOL/L (ref 3.4–5.3)
SODIUM SERPL-SCNC: 133 MMOL/L (ref 136–145)

## 2023-07-15 PROCEDURE — 83735 ASSAY OF MAGNESIUM: CPT | Performed by: COLON & RECTAL SURGERY

## 2023-07-15 PROCEDURE — 250N000011 HC RX IP 250 OP 636: Mod: JZ | Performed by: COLON & RECTAL SURGERY

## 2023-07-15 PROCEDURE — 250N000013 HC RX MED GY IP 250 OP 250 PS 637

## 2023-07-15 PROCEDURE — 250N000009 HC RX 250: Mod: JZ | Performed by: COLON & RECTAL SURGERY

## 2023-07-15 PROCEDURE — 80048 BASIC METABOLIC PNL TOTAL CA: CPT

## 2023-07-15 PROCEDURE — 97110 THERAPEUTIC EXERCISES: CPT | Mod: GO

## 2023-07-15 PROCEDURE — 97110 THERAPEUTIC EXERCISES: CPT | Mod: GP

## 2023-07-15 PROCEDURE — 120N000001 HC R&B MED SURG/OB

## 2023-07-15 PROCEDURE — 97535 SELF CARE MNGMENT TRAINING: CPT | Mod: GO

## 2023-07-15 PROCEDURE — B4185 PARENTERAL SOL 10 GM LIPIDS: HCPCS | Mod: JZ | Performed by: COLON & RECTAL SURGERY

## 2023-07-15 PROCEDURE — C9113 INJ PANTOPRAZOLE SODIUM, VIA: HCPCS | Mod: JZ | Performed by: COLON & RECTAL SURGERY

## 2023-07-15 RX ADMIN — MELOXICAM 7.5 MG: 7.5 TABLET ORAL at 09:23

## 2023-07-15 RX ADMIN — PANTOPRAZOLE SODIUM 40 MG: 40 INJECTION, POWDER, FOR SOLUTION INTRAVENOUS at 09:34

## 2023-07-15 RX ADMIN — FLUTICASONE PROPIONATE 2 SPRAY: 50 SPRAY, METERED NASAL at 09:29

## 2023-07-15 RX ADMIN — OLIVE OIL AND SOYBEAN OIL 250 ML: 16; 4 INJECTION, EMULSION INTRAVENOUS at 20:00

## 2023-07-15 RX ADMIN — ENOXAPARIN SODIUM 40 MG: 40 INJECTION SUBCUTANEOUS at 09:29

## 2023-07-15 RX ADMIN — Medication 81 MG: at 09:23

## 2023-07-15 RX ADMIN — MIRABEGRON 50 MG: 25 TABLET, FILM COATED, EXTENDED RELEASE ORAL at 17:46

## 2023-07-15 RX ADMIN — LORAZEPAM 0.5 MG: 0.5 TABLET ORAL at 20:27

## 2023-07-15 RX ADMIN — GABAPENTIN 100 MG: 100 CAPSULE ORAL at 17:46

## 2023-07-15 RX ADMIN — GABAPENTIN 100 MG: 100 CAPSULE ORAL at 09:23

## 2023-07-15 RX ADMIN — LORAZEPAM 0.5 MG: 0.5 TABLET ORAL at 09:23

## 2023-07-15 RX ADMIN — FLUOXETINE 60 MG: 20 CAPSULE ORAL at 09:23

## 2023-07-15 RX ADMIN — ARIPIPRAZOLE 30 MG: 15 TABLET ORAL at 21:48

## 2023-07-15 RX ADMIN — HYDROXYZINE HYDROCHLORIDE 50 MG: 50 TABLET ORAL at 21:48

## 2023-07-15 RX ADMIN — DOXAZOSIN 2 MG: 1 TABLET ORAL at 20:27

## 2023-07-15 RX ADMIN — GABAPENTIN 100 MG: 100 CAPSULE ORAL at 21:48

## 2023-07-15 RX ADMIN — MAGNESIUM SULFATE HEPTAHYDRATE: 500 INJECTION, SOLUTION INTRAMUSCULAR; INTRAVENOUS at 20:00

## 2023-07-15 RX ADMIN — DOXEPIN 3 MG: 3 TABLET, FILM COATED ORAL at 21:52

## 2023-07-15 RX ADMIN — MELOXICAM 7.5 MG: 7.5 TABLET ORAL at 17:46

## 2023-07-15 ASSESSMENT — ACTIVITIES OF DAILY LIVING (ADL)
ADLS_ACUITY_SCORE: 36
ADLS_ACUITY_SCORE: 36
ADLS_ACUITY_SCORE: 34
ADLS_ACUITY_SCORE: 36
ADLS_ACUITY_SCORE: 31
ADLS_ACUITY_SCORE: 36
ADLS_ACUITY_SCORE: 36
ADLS_ACUITY_SCORE: 32
ADLS_ACUITY_SCORE: 40
ADLS_ACUITY_SCORE: 32
ADLS_ACUITY_SCORE: 30
ADLS_ACUITY_SCORE: 32

## 2023-07-15 NOTE — PHARMACY-CONSULT NOTE
"Pharmacy Note: Parenteral Nutrition (PN) Management    Pharmacist consulted to dose PN for Dayday Silva, a 58 year old male by Dr. Vlaadez    Subjective:    The patient is a new PN start.    The patient was started on PN in the hospital on 7/10/23.    Indication for PN therapy: bowel obstruction    Inadequate nutrition anticipated for > 7 days.     Enteral nutrition contraindicated due to: small bowel obstruction.      Social History     Tobacco Use     Smoking status: Former     Packs/day: 1.00     Years: 32.00     Pack years: 32.00     Types: Cigarettes     Start date: 1984     Quit date: 2016     Years since quittin.5     Smokeless tobacco: Former   Vaping Use     Vaping Use: Never used   Substance Use Topics     Alcohol use: Yes     Comment: occasional     Drug use: No     Objective:    Ht Readings from Last 1 Encounters:   22 1.651 m (5' 5\")     Wt Readings from Last 1 Encounters:   23 113 kg (249 lb 3.2 oz)       Body mass index is 41.47 kg/m .    Patient Vitals for the past 96 hrs:   Weight   23 0902 113 kg (249 lb 3.2 oz)   23 1114 114.2 kg (251 lb 11.2 oz)   23 0930 116.2 kg (256 lb 2.8 oz)       Labs:  Last 3 days:  Recent Labs     23  0556 23  0650 07/15/23  0547    135* 133*   POTASSIUM 3.6 3.9 4.1   CHLORIDE 96* 96* 97*   CO2 32* 29 25   BUN 18.3 16.1 14.6   CR 0.98 0.91 0.87   REX 8.7 9.2 9.0   MAG 2.3 2.2 2.1   PHOS 4.2 3.9  --    HGB 10.9*  --   --    HCT 35.5*  --   --      --   --        Glucose (past 48 hours):   Recent Labs     23  1506 23  1918 23  0215 23  0650 23  0806 23  1427 07/15/23  0036 07/15/23  0547 07/15/23  0823   * 125* 138* 129* 123* 135* 121* 118* 154*       Intake/Output (last 24 hours): I/O last 3 completed shifts:  In: 6141.58 [P.O.:240; I.V.:1198; IV Piggyback:500]  Out: 1250 [Urine:250; Emesis/NG output:1000]    Estimated CrCl: Estimated Creatinine Clearance: 106.2 " mL/min (based on SCr of 0.87 mg/dL).    Assessment:    Continue patient on PN therapy as a continuous central therapy.     Given the patient's current condition/oral intake, PN is still indicated.    Lab results reviewed: 7/15. Will increase Na in TPN.    Plan:  1. Rate of PN: 95 mL/hr. Maintenance IV fluid rate will be adjusted appropriately to meet the total maximum IV fluids rate as ordered by provider.  2. Formula:     Amino Acids 114 grams    Dextrose 342 grams    Sodium 110 mEq/day    Potassium 75 mEq/day    Calcium 10 mEq/day    Magnesium 11 mEq/day    Phosphorus 24 mMol/day    Chloride: Acetate Ratio 1:1    Standard Multivitamins w/Vitamin K    Trace Elements  3. Fat Emulsion: 20%, 250 mL IV daily.  4. Check hyperal lytes labs tomorrow.  5. Pharmacist will continue to follow the patient's lab results, clinical status and blood glucose results and make adjustments as appropriate.    Thank you for the consult.  Dayday Monreal RPH  7/15/2023 9:27 AM

## 2023-07-15 NOTE — PROGRESS NOTES
Colon and Rectal Surgery  Daily Progress Note    Subjective  Dayday feels ok today - was incontinent of urine/stool yesterday and had multiple loose stools. Ambulated yesterday and was up in chair most of shift yesterday.      I/ po 240, IVF 1198, TPN 4203  O/ Uop 250 + 5 unmeasured voids, NGT 1L, BMx4    Objective  Intake/Output last 24 hrs:    Intake/Output Summary (Last 24 hours) at 7/14/2023 0921  Last data filed at 7/14/2023 0903  Gross per 24 hour   Intake 955.35 ml   Output 2850 ml   Net -1894.65 ml     Temp:  [98.2  F (36.8  C)-98.7  F (37.1  C)] 98.5  F (36.9  C)  Pulse:  [] 102  Resp:  [18-20] 20  BP: (116-136)/(58-66) 116/58  SpO2:  [91 %-97 %] 91 %    Physical Exam:  General: awake, alert,, in no acute distress  HEENT: NG tube in place with bilious output now, no more blood.  2.7 L out  Respiratory: non-labored breathing  Abdomen: Soft, less distended than previously.  Still little tympanic to percussion.  No pain.  Incisions c/d/i with some resolving ecchymosis.  Musculoskeletal: moves all four extremities equally; no calf edema or tenderness  Psychological: alert and oriented, answers questions appropriately    Pertinent Labs  Lab Results: personally reviewed.  Lab Results   Component Value Date     07/14/2023     07/13/2023     07/12/2023    CO2 29 07/14/2023    CO2 32 07/13/2023    CO2 29 07/12/2023    CO2 24 02/15/2022    CO2 26 09/21/2020    CO2 24 01/09/2020    BUN 16.1 07/14/2023    BUN 18.3 07/13/2023    BUN 18.7 07/12/2023    BUN 12 02/15/2022    BUN 12 09/21/2020    BUN 10 01/09/2020     Lab Results   Component Value Date    WBC 12.6 07/13/2023    WBC 12.5 07/12/2023    WBC 14.1 07/11/2023    HGB 10.9 07/13/2023    HGB 11.5 07/12/2023    HGB 12.0 07/11/2023    HCT 35.5 07/13/2023    HCT 36.5 07/12/2023    HCT 38.1 07/11/2023    MCV 92 07/13/2023    MCV 91 07/12/2023    MCV 93 07/11/2023     07/13/2023     07/12/2023     07/11/2023        Assessment/Plan: This is a 58 year old male POD #12 s/p robotic extended left colectomy for colon cancer.    - Continuing to have BMs for two days now and passing gas. Feeling better and less distended overall.   - However, NGT output still 1L. Will keep NGT today to suction. Pending NGT output + bowel function tomorrow, may do gravity trial.  -Continue IVF/TPN through weekend.  -Only 250 of Uop recorded but 5 unmeasured voids. Please accurately record Is/Os.  -Continue protonix now resolved bloody NGT output. Continue lovenox at 40mg only daily.  -BMP daily while the NG tube is in place.  -D/w Dr Black who agreed with plan    For questions/paging, please contact the CRS office at [phone].     Odalys Couch MD  Colon & Rectal Surgical Fellow     [facility]  Please page on-call service pager with questions.

## 2023-07-15 NOTE — PLAN OF CARE
Problem: Surgery Nonspecified  Goal: Effective Bowel Elimination  Outcome: Progressing   Goal Outcome Evaluation:         Alert and oriented.   Calls for help appropriately.     Denies pain, nausea and any other complain.     NG to suction. Brown output.   Passing gas per pt report. Had small BM today.   Incontinent of urine.     TPN/lipids + IVF running.     Up ambulating in the hallways several times during the day with OT, PT and floor staff. Sitting up in chair after walks.  He took a shower today.       IS up to 750-1000.

## 2023-07-15 NOTE — PLAN OF CARE
Problem: Plan of Care - These are the overarching goals to be used throughout the patient stay.    Goal: Optimal Comfort and Wellbeing  Outcome: Progressing     Problem: Surgery Nonspecified  Goal: Effective Bowel Elimination  Outcome: Progressing  Goal: Effective Urinary Elimination  Outcome: Progressing     Problem: Parenteral Nutrition  Goal: Effective Intravenous Nutrition Therapy Delivery  Outcome: Progressing     Goal Outcome Evaluation:         Pt denies pain. NG intact and patent on low continuous suction. Denies n/v. Incontinent of urine and at times stool. Had moderate output in brief. Ambulated to bathroom with Ax1. Up in chair part of shift. Had small loose stool. TPN at 95 mL/hr, Lipids at 20.8 mL/hr, NS at 9.2 mL/hr.

## 2023-07-15 NOTE — PLAN OF CARE
Problem: Parenteral Nutrition  Goal: Effective Intravenous Nutrition Therapy Delivery  Outcome: Progressing   Goal Outcome Evaluation:       Diet: NPO  TPN: clinimix: ( 5/15 e) with Multivit added @ 95 ml/hr plus 250 ml of 20% lipids daily providin ml, 2119 Calories, 114 g protein, 342 g CHO and 50 g fat per day  IVF sodium chloride 0.45% @ 30 ml/hr     Pt is stooling and passing gas.  NGT output: 1300 mL last 24 hours     Per Surgery continue TPN over the weekend.     No changes to TPN macronutrients today.

## 2023-07-16 LAB
ANION GAP SERPL CALCULATED.3IONS-SCNC: 9 MMOL/L (ref 7–15)
BACTERIA BLD CULT: NO GROWTH
BACTERIA BLD CULT: NO GROWTH
BUN SERPL-MCNC: 13.7 MG/DL (ref 8–23)
CALCIUM SERPL-MCNC: 8.5 MG/DL (ref 8.6–10)
CHLORIDE SERPL-SCNC: 99 MMOL/L (ref 98–107)
CREAT SERPL-MCNC: 0.86 MG/DL (ref 0.67–1.17)
DEPRECATED HCO3 PLAS-SCNC: 25 MMOL/L (ref 22–29)
GFR SERPL CREATININE-BSD FRML MDRD: >90 ML/MIN/1.73M2
GLUCOSE BLDC GLUCOMTR-MCNC: 134 MG/DL (ref 70–99)
GLUCOSE BLDC GLUCOMTR-MCNC: 138 MG/DL (ref 70–99)
GLUCOSE SERPL-MCNC: 118 MG/DL (ref 70–99)
MAGNESIUM SERPL-MCNC: 2.2 MG/DL (ref 1.7–2.3)
PLATELET # BLD AUTO: 397 10E3/UL (ref 150–450)
POTASSIUM SERPL-SCNC: 4.2 MMOL/L (ref 3.4–5.3)
SODIUM SERPL-SCNC: 133 MMOL/L (ref 136–145)

## 2023-07-16 PROCEDURE — 80048 BASIC METABOLIC PNL TOTAL CA: CPT | Performed by: STUDENT IN AN ORGANIZED HEALTH CARE EDUCATION/TRAINING PROGRAM

## 2023-07-16 PROCEDURE — 258N000002 HC RX IP 258 OP 250: Performed by: COLON & RECTAL SURGERY

## 2023-07-16 PROCEDURE — 85049 AUTOMATED PLATELET COUNT: CPT | Performed by: COLON & RECTAL SURGERY

## 2023-07-16 PROCEDURE — C9113 INJ PANTOPRAZOLE SODIUM, VIA: HCPCS | Mod: JZ | Performed by: COLON & RECTAL SURGERY

## 2023-07-16 PROCEDURE — B4185 PARENTERAL SOL 10 GM LIPIDS: HCPCS | Mod: JZ | Performed by: COLON & RECTAL SURGERY

## 2023-07-16 PROCEDURE — 83735 ASSAY OF MAGNESIUM: CPT | Performed by: COLON & RECTAL SURGERY

## 2023-07-16 PROCEDURE — 120N000001 HC R&B MED SURG/OB

## 2023-07-16 PROCEDURE — 250N000011 HC RX IP 250 OP 636: Mod: JZ | Performed by: COLON & RECTAL SURGERY

## 2023-07-16 PROCEDURE — 250N000013 HC RX MED GY IP 250 OP 250 PS 637

## 2023-07-16 PROCEDURE — 250N000009 HC RX 250: Performed by: COLON & RECTAL SURGERY

## 2023-07-16 RX ADMIN — GABAPENTIN 100 MG: 100 CAPSULE ORAL at 21:59

## 2023-07-16 RX ADMIN — MELOXICAM 7.5 MG: 7.5 TABLET ORAL at 08:18

## 2023-07-16 RX ADMIN — ENOXAPARIN SODIUM 40 MG: 40 INJECTION SUBCUTANEOUS at 08:31

## 2023-07-16 RX ADMIN — GABAPENTIN 100 MG: 100 CAPSULE ORAL at 17:40

## 2023-07-16 RX ADMIN — DOXEPIN 3 MG: 3 TABLET, FILM COATED ORAL at 21:55

## 2023-07-16 RX ADMIN — GABAPENTIN 100 MG: 100 CAPSULE ORAL at 08:18

## 2023-07-16 RX ADMIN — MAGNESIUM SULFATE HEPTAHYDRATE: 500 INJECTION, SOLUTION INTRAMUSCULAR; INTRAVENOUS at 20:02

## 2023-07-16 RX ADMIN — LORAZEPAM 0.5 MG: 0.5 TABLET ORAL at 20:15

## 2023-07-16 RX ADMIN — SODIUM CHLORIDE: 4.5 INJECTION, SOLUTION INTRAVENOUS at 02:19

## 2023-07-16 RX ADMIN — OLIVE OIL AND SOYBEAN OIL 250 ML: 16; 4 INJECTION, EMULSION INTRAVENOUS at 20:02

## 2023-07-16 RX ADMIN — DOXAZOSIN 2 MG: 1 TABLET ORAL at 20:15

## 2023-07-16 RX ADMIN — FLUTICASONE PROPIONATE 2 SPRAY: 50 SPRAY, METERED NASAL at 08:19

## 2023-07-16 RX ADMIN — Medication 81 MG: at 08:18

## 2023-07-16 RX ADMIN — MELOXICAM 7.5 MG: 7.5 TABLET ORAL at 17:40

## 2023-07-16 RX ADMIN — MIRABEGRON 50 MG: 25 TABLET, FILM COATED, EXTENDED RELEASE ORAL at 17:40

## 2023-07-16 RX ADMIN — FLUOXETINE 60 MG: 20 CAPSULE ORAL at 08:18

## 2023-07-16 RX ADMIN — LORAZEPAM 0.5 MG: 0.5 TABLET ORAL at 08:18

## 2023-07-16 RX ADMIN — ARIPIPRAZOLE 30 MG: 15 TABLET ORAL at 21:56

## 2023-07-16 RX ADMIN — HYDROXYZINE HYDROCHLORIDE 50 MG: 50 TABLET ORAL at 21:56

## 2023-07-16 RX ADMIN — PANTOPRAZOLE SODIUM 40 MG: 40 INJECTION, POWDER, FOR SOLUTION INTRAVENOUS at 08:18

## 2023-07-16 ASSESSMENT — ACTIVITIES OF DAILY LIVING (ADL)
ADLS_ACUITY_SCORE: 36
ADLS_ACUITY_SCORE: 36
ADLS_ACUITY_SCORE: 30
ADLS_ACUITY_SCORE: 30
ADLS_ACUITY_SCORE: 36
ADLS_ACUITY_SCORE: 30
ADLS_ACUITY_SCORE: 36
ADLS_ACUITY_SCORE: 30
ADLS_ACUITY_SCORE: 30

## 2023-07-16 NOTE — PLAN OF CARE
Problem: Parenteral Nutrition  Goal: Effective Intravenous Nutrition Therapy Delivery  Outcome: Progressing   Goal Outcome Evaluation:      Diet: NPO  TPN: clinimix: ( 5/15 e) with Multivit added @ 95 ml/hr plus 250 ml of 20% lipids daily providin ml, 2119 Calories, 114 g protein, 342 g CHO and 50 g fat per day  IVF sodium chloride 0.45% @ 30 ml/hr     Pt is stooling and passing gas.  NGT output: 300 mL past 24 hours     Per Surgery continue TPN over the weekend.     No changes to TPN macronutrients today.

## 2023-07-16 NOTE — PROGRESS NOTES
Colon and Rectal Surgery  Daily Progress Note    Subjective  No acute events overnight. Feeling well this morning. No nausea or vomiting. Having multiple BM. Pain under control. Denies any bloating.        Objective  Intake/Output last 24 hrs:    Intake/Output Summary (Last 24 hours) at 7/14/2023 0921  Last data filed at 7/14/2023 0903  Gross per 24 hour   Intake 955.35 ml   Output 2850 ml   Net -1894.65 ml     Temp:  [97.9  F (36.6  C)-98.5  F (36.9  C)] 98.3  F (36.8  C)  Pulse:  [101-106] 106  Resp:  [20] 20  BP: (116-139)/(58-83) 139/83  SpO2:  [91 %-98 %] 92 %    Physical Exam:  General: awake, alert,, in no acute distress  HEENT: NG tube in place with bile tinged fluid output now.  500 out  Respiratory: non-labored breathing  Abdomen: Soft, nondistended. Non-tender.  Incisions c/d/i with some resolving ecchymosis.  Musculoskeletal: moves all four extremities equally; no calf edema or tenderness  Psychological: alert and oriented, answers questions appropriately    Pertinent Labs  Lab Results: personally reviewed.  Lab Results   Component Value Date     07/14/2023     07/13/2023     07/12/2023    CO2 29 07/14/2023    CO2 32 07/13/2023    CO2 29 07/12/2023    CO2 24 02/15/2022    CO2 26 09/21/2020    CO2 24 01/09/2020    BUN 16.1 07/14/2023    BUN 18.3 07/13/2023    BUN 18.7 07/12/2023    BUN 12 02/15/2022    BUN 12 09/21/2020    BUN 10 01/09/2020     Lab Results   Component Value Date    WBC 12.6 07/13/2023    WBC 12.5 07/12/2023    WBC 14.1 07/11/2023    HGB 10.9 07/13/2023    HGB 11.5 07/12/2023    HGB 12.0 07/11/2023    HCT 35.5 07/13/2023    HCT 36.5 07/12/2023    HCT 38.1 07/11/2023    MCV 92 07/13/2023    MCV 91 07/12/2023    MCV 93 07/11/2023     07/13/2023     07/12/2023     07/11/2023       Assessment/Plan: This is a 58 year old male POD #13 s/p robotic extended left colectomy for colon cancer.    - Continuing to have BMs and passing gas. Feeling better and less  distended overall.   - Will do gravity trial for 8 hours  -Continue IVF/TPN through weekend.  -Continue protonix now resolved bloody NGT output. Continue lovenox at 40mg only daily.  -BMP daily while the NG tube is in place.    Jona Black MD  [facility]  Office: [phone]  7/16/2023 7:03 AM

## 2023-07-16 NOTE — PROGRESS NOTES
"Care Management Follow Up    Length of Stay (days): 13    Expected Discharge Date: 07/19/2023     Concerns to be Addressed:  Medical progression  Patient plan of care discussed at interdisciplinary rounds: Yes    Anticipated Discharge Disposition:  Group home with home care     Anticipated Discharge Services:   Home care - RN/PT/OT  Anticipated Discharge DME:  TBD    Patient/family educated on Medicare website which has current facility and service quality ratings:  NA  Education Provided on the Discharge Plan:  Per team  Patient/Family in Agreement with the Plan:  yes    Referrals Placed by CM/SW:  Home care  Private pay costs discussed: NA    Additional Information:  Chart reviewed.    Accepted to [facility] for - RN/PT/OT services. Plan to return to group home when patient is medically ready.     Social HX: \"Pt has lived in a group home in [facility] for the past eight years. Pt reports he is independent with mobility but uses a cane or walker as needed. Pt reports that he has a home CPAP machine that he wears at night. Pt stated that his mother Chrissy is his guardian.\"    CM will continue to follow care progression and aide in discharge planning as needed.      Janel Yates RN      "

## 2023-07-16 NOTE — PHARMACY-CONSULT NOTE
"Pharmacy Note: Parenteral Nutrition (PN) Management    Pharmacist consulted to dose PN for Dayday Silva, a 58 year old male by Dr. Valadez.    Subjective:    The patient is a new PN start.    The patient was started on PN in the hospital on 7/10/23.    Indication for PN therapy: bowel obstruction    Inadequate nutrition anticipated for > 7 days.     Enteral nutrition contraindicated due to: small bowel obstruction.      Social History     Tobacco Use     Smoking status: Former     Packs/day: 1.00     Years: 32.00     Pack years: 32.00     Types: Cigarettes     Start date: 1984     Quit date: 2016     Years since quittin.5     Smokeless tobacco: Former   Vaping Use     Vaping Use: Never used   Substance Use Topics     Alcohol use: Yes     Comment: occasional     Drug use: No     Objective:    Ht Readings from Last 1 Encounters:   22 1.651 m (5' 5\")     Wt Readings from Last 1 Encounters:   07/15/23 114.2 kg (251 lb 11.2 oz)       Body mass index is 41.89 kg/m .    Patient Vitals for the past 96 hrs:   Weight   07/15/23 0953 114.2 kg (251 lb 11.2 oz)   23 0902 113 kg (249 lb 3.2 oz)   23 1114 114.2 kg (251 lb 11.2 oz)       Labs:  Last 3 days:  Recent Labs     23  0650 07/15/23  0547 23  0549   * 133* 133*   POTASSIUM 3.9 4.1 4.2   CHLORIDE 96* 97* 99   CO2 29 25 25   BUN 16.1 14.6 13.7   CR 0.91 0.87 0.86   REX 9.2 9.0 8.5*   MAG 2.2 2.1 2.2   PHOS 3.9  --   --    PLT  --   --  397       Glucose (past 48 hours):   Recent Labs     23  1427 07/15/23  0036 07/15/23  0547 07/15/23  0823 23  0549   * 121* 118* 154* 118*       Intake/Output (last 24 hours): I/O last 3 completed shifts:  In: 1238.99 [P.O.:240; I.V.:215]  Out: 1225 [Urine:725; Emesis/NG output:500]    Estimated CrCl: Estimated Creatinine Clearance: 108.1 mL/min (based on SCr of 0.86 mg/dL).    Assessment:    Continue patient on PN therapy as a continuous central therapy.     Given the " patient's current condition/oral intake, PN is still indicated.    Lab results reviewed: 7/16    Plan:  1. Rate of PN: 95 mL/hr. Maintenance IV fluid rate will be adjusted appropriately to meet the total maximum IV fluids rate as ordered by provider.  2. Formula:     Amino Acids 114 grams    Dextrose 342 grams    Sodium 110 mEq/day    Potassium 75 mEq/day    Calcium 10 mEq/day    Magnesium 11 mEq/day    Phosphorus 24 mMol/day    Chloride: Acetate Ratio 1:1    Standard Multivitamins w/Vitamin K    Trace Elements  3. Fat Emulsion: 20%, 250 mL IV daily.   4. Check hyperal lytes labs tomorrow.  5. Pharmacist will continue to follow the patient's lab results, clinical status and blood glucose results and make adjustments as appropriate.    Thank you for the consult.  Dayday Monreal RPH  7/16/2023 9:41 AM

## 2023-07-16 NOTE — PLAN OF CARE
"  Problem: Surgery Nonspecified  Goal: Anesthesia/Sedation Recovery  Intervention: Optimize Anesthesia Recovery  Recent Flowsheet Documentation  Safety Promotion/Fall Prevention:    activity supervised    assistive device/personal items within reach    clutter free environment maintained    mobility aid in reach    nonskid shoes/slippers when out of bed    patient and family education     Problem: Parenteral Nutrition  Goal: Effective Intravenous Nutrition Therapy Delivery  Intervention: Optimize Intravenous Nutrition Delivery  Recent Flowsheet Documentation  Medication Review/Management: medications reviewed   Goal Outcome Evaluation:    Denied discomfort all evening.  Enquiring when he will be able to start eating.  \"I am hungry.\" Remains NPO with NG to continuous suction.  Tolerated NG clamp after meds.  TPN/Lipids/Fluids infusing per order.  Reported having bowel movement but only passed gas.  Voiding using urinal. Primofit now in place.  Temp: 97.9  F (36.6  C) Temp src: Oral BP: 131/74 Pulse: 101   Resp: 20 SpO2: 98 % O2 Device: None (Room air).  Continue to monitor.               "

## 2023-07-16 NOTE — PLAN OF CARE
Goal Outcome Evaluation:      Problem: Plan of Care - These are the overarching goals to be used throughout the patient stay.    Goal: Absence of Hospital-Acquired Illness or Injury  Intervention: Identify and Manage Fall Risk  Recent Flowsheet Documentation  Taken 7/16/2023 0815 by Michelle Munson, RN  Safety Promotion/Fall Prevention:    activity supervised    assistive device/personal items within reach    nonskid shoes/slippers when out of bed    patient and family education    safety round/check completed    clutter free environment maintained      Problem: Parenteral Nutrition  Goal: Effective Intravenous Nutrition Therapy Delivery  Outcome: Progressing  Intervention: Optimize Intravenous Nutrition Delivery  Recent Flowsheet Documentation  Taken 7/16/2023 0815 by Michelle Munson, RN  Medication Review/Management: medications reviewed  TPN running at 95 ml/hr per order.   .  Maintenance IV fluid 0.45% NS ar 30 ml/hr    NG tube connected to salas bag per MD order. Pt has not complaint of any nausea at this time.   Dark brown drainage; output 300 ml.    Pt assist of 1 with bed transfers. VSS.     Pt's mom visiting.

## 2023-07-16 NOTE — PLAN OF CARE
Problem: Plan of Care - These are the overarching goals to be used throughout the patient stay.    Goal: Optimal Comfort and Wellbeing  Outcome: Progressing     Problem: Surgery Nonspecified  Goal: Absence of Bleeding  Outcome: Progressing     Problem: Parenteral Nutrition  Goal: Effective Intravenous Nutrition Therapy Delivery  Outcome: Progressing   Goal Outcome Evaluation:       Denied pain overnight. NG to [facility], with brown OP. Slept intermittently.

## 2023-07-17 ENCOUNTER — APPOINTMENT (OUTPATIENT)
Dept: RADIOLOGY | Facility: HOSPITAL | Age: 58
DRG: 330 | End: 2023-07-17
Attending: COLON & RECTAL SURGERY
Payer: COMMERCIAL

## 2023-07-17 ENCOUNTER — APPOINTMENT (OUTPATIENT)
Dept: OCCUPATIONAL THERAPY | Facility: HOSPITAL | Age: 58
DRG: 330 | End: 2023-07-17
Attending: COLON & RECTAL SURGERY
Payer: COMMERCIAL

## 2023-07-17 LAB
ALBUMIN SERPL BCG-MCNC: 2.5 G/DL (ref 3.5–5.2)
ALP SERPL-CCNC: 170 U/L (ref 40–129)
ALT SERPL W P-5'-P-CCNC: 121 U/L (ref 0–70)
ANION GAP SERPL CALCULATED.3IONS-SCNC: 11 MMOL/L (ref 7–15)
AST SERPL W P-5'-P-CCNC: 86 U/L (ref 0–45)
BILIRUB SERPL-MCNC: 1.1 MG/DL
BUN SERPL-MCNC: 14 MG/DL (ref 8–23)
CALCIUM SERPL-MCNC: 9 MG/DL (ref 8.6–10)
CHLORIDE SERPL-SCNC: 98 MMOL/L (ref 98–107)
CREAT SERPL-MCNC: 0.82 MG/DL (ref 0.67–1.17)
DEPRECATED HCO3 PLAS-SCNC: 24 MMOL/L (ref 22–29)
GFR SERPL CREATININE-BSD FRML MDRD: >90 ML/MIN/1.73M2
GLUCOSE SERPL-MCNC: 118 MG/DL (ref 70–99)
INR PPP: 1.25 (ref 0.85–1.15)
MAGNESIUM SERPL-MCNC: 2.1 MG/DL (ref 1.7–2.3)
PHOSPHATE SERPL-MCNC: 3.8 MG/DL (ref 2.5–4.5)
POTASSIUM SERPL-SCNC: 4.2 MMOL/L (ref 3.4–5.3)
PREALB SERPL IA-MCNC: 8 MG/DL (ref 15–45)
PROT SERPL-MCNC: 7.3 G/DL (ref 6.4–8.3)
SODIUM SERPL-SCNC: 133 MMOL/L (ref 136–145)
TRIGL SERPL-MCNC: 115 MG/DL

## 2023-07-17 PROCEDURE — 84478 ASSAY OF TRIGLYCERIDES: CPT | Performed by: COLON & RECTAL SURGERY

## 2023-07-17 PROCEDURE — 74018 RADEX ABDOMEN 1 VIEW: CPT

## 2023-07-17 PROCEDURE — 83735 ASSAY OF MAGNESIUM: CPT | Performed by: COLON & RECTAL SURGERY

## 2023-07-17 PROCEDURE — 80053 COMPREHEN METABOLIC PANEL: CPT | Performed by: COLON & RECTAL SURGERY

## 2023-07-17 PROCEDURE — 85610 PROTHROMBIN TIME: CPT | Performed by: COLON & RECTAL SURGERY

## 2023-07-17 PROCEDURE — 250N000011 HC RX IP 250 OP 636: Mod: JZ | Performed by: COLON & RECTAL SURGERY

## 2023-07-17 PROCEDURE — 97535 SELF CARE MNGMENT TRAINING: CPT | Mod: GO

## 2023-07-17 PROCEDURE — 250N000009 HC RX 250: Mod: JZ | Performed by: COLON & RECTAL SURGERY

## 2023-07-17 PROCEDURE — 258N000002 HC RX IP 258 OP 250: Performed by: COLON & RECTAL SURGERY

## 2023-07-17 PROCEDURE — 999N000157 HC STATISTIC RCP TIME EA 10 MIN

## 2023-07-17 PROCEDURE — C9113 INJ PANTOPRAZOLE SODIUM, VIA: HCPCS | Mod: JZ | Performed by: COLON & RECTAL SURGERY

## 2023-07-17 PROCEDURE — 250N000013 HC RX MED GY IP 250 OP 250 PS 637

## 2023-07-17 PROCEDURE — 84100 ASSAY OF PHOSPHORUS: CPT | Performed by: COLON & RECTAL SURGERY

## 2023-07-17 PROCEDURE — 84134 ASSAY OF PREALBUMIN: CPT | Performed by: COLON & RECTAL SURGERY

## 2023-07-17 PROCEDURE — 120N000001 HC R&B MED SURG/OB

## 2023-07-17 PROCEDURE — B4185 PARENTERAL SOL 10 GM LIPIDS: HCPCS | Mod: JZ | Performed by: COLON & RECTAL SURGERY

## 2023-07-17 RX ADMIN — MAGNESIUM SULFATE HEPTAHYDRATE: 500 INJECTION, SOLUTION INTRAMUSCULAR; INTRAVENOUS at 20:10

## 2023-07-17 RX ADMIN — GABAPENTIN 100 MG: 100 CAPSULE ORAL at 09:56

## 2023-07-17 RX ADMIN — HYDROXYZINE HYDROCHLORIDE 50 MG: 50 TABLET ORAL at 22:24

## 2023-07-17 RX ADMIN — FLUOXETINE 60 MG: 20 CAPSULE ORAL at 09:56

## 2023-07-17 RX ADMIN — MELOXICAM 7.5 MG: 7.5 TABLET ORAL at 09:56

## 2023-07-17 RX ADMIN — GABAPENTIN 100 MG: 100 CAPSULE ORAL at 22:25

## 2023-07-17 RX ADMIN — FLUTICASONE PROPIONATE 2 SPRAY: 50 SPRAY, METERED NASAL at 09:59

## 2023-07-17 RX ADMIN — LORAZEPAM 0.5 MG: 0.5 TABLET ORAL at 20:12

## 2023-07-17 RX ADMIN — MIRABEGRON 50 MG: 25 TABLET, FILM COATED, EXTENDED RELEASE ORAL at 16:59

## 2023-07-17 RX ADMIN — DOXAZOSIN 2 MG: 1 TABLET ORAL at 20:12

## 2023-07-17 RX ADMIN — ENOXAPARIN SODIUM 40 MG: 40 INJECTION SUBCUTANEOUS at 10:00

## 2023-07-17 RX ADMIN — DIATRIZOATE MEGLUMINE AND DIATRIZOATE SODIUM 75 ML: 660; 100 SOLUTION ORAL; RECTAL at 10:36

## 2023-07-17 RX ADMIN — OLIVE OIL AND SOYBEAN OIL 250 ML: 16; 4 INJECTION, EMULSION INTRAVENOUS at 20:11

## 2023-07-17 RX ADMIN — DOXEPIN 3 MG: 3 TABLET, FILM COATED ORAL at 22:25

## 2023-07-17 RX ADMIN — SODIUM CHLORIDE: 4.5 INJECTION, SOLUTION INTRAVENOUS at 16:20

## 2023-07-17 RX ADMIN — MELOXICAM 7.5 MG: 7.5 TABLET ORAL at 17:00

## 2023-07-17 RX ADMIN — PANTOPRAZOLE SODIUM 40 MG: 40 INJECTION, POWDER, FOR SOLUTION INTRAVENOUS at 10:02

## 2023-07-17 RX ADMIN — LORAZEPAM 0.5 MG: 0.5 TABLET ORAL at 09:56

## 2023-07-17 RX ADMIN — GABAPENTIN 100 MG: 100 CAPSULE ORAL at 17:00

## 2023-07-17 RX ADMIN — Medication 81 MG: at 09:56

## 2023-07-17 RX ADMIN — ARIPIPRAZOLE 30 MG: 15 TABLET ORAL at 22:25

## 2023-07-17 ASSESSMENT — ACTIVITIES OF DAILY LIVING (ADL)
ADLS_ACUITY_SCORE: 36
ADLS_ACUITY_SCORE: 34
ADLS_ACUITY_SCORE: 34
ADLS_ACUITY_SCORE: 36

## 2023-07-17 NOTE — PROVIDER NOTIFICATION
Tom CAMPOS called to let RN know that it's ok not to call for Gastrografin Xray results overnight. Colorectal team will review results in the morning.     Also, PA notified of pt having a temp of 100.9 F on the evening. Temp 99.6 upon recheck. No new orders, continue to monitor.

## 2023-07-17 NOTE — PROGRESS NOTES
Colon and Rectal Surgery  Daily Progress Note    Subjective  Did well with gravity trial yesterday and NGT removed. Tolerating sips and ice chips. Denies any abdominal distention, bloating, nausea or vomiting. Was able to ambulate multiple times yesterday. Still passing flatus, had 2 BM yesterday.     I/   O/ uop 800, , BM x2    Objective  Intake/Output last 24 hrs:    Intake/Output Summary (Last 24 hours) at 7/14/2023 0921  Last data filed at 7/14/2023 0903  Gross per 24 hour   Intake 955.35 ml   Output 2850 ml   Net -1894.65 ml     Temp:  [98.2  F (36.8  C)-98.5  F (36.9  C)] 98.5  F (36.9  C)  Pulse:  [100-115] 103  Resp:  [20-22] 20  BP: (124-178)/(59-83) 126/60  SpO2:  [92 %-99 %] 93 %    Physical Exam:  General: awake, alert,, in no acute distress  HEENT: NG tube in place with bile tinged fluid output now.  500 out  Respiratory: non-labored breathing  Abdomen: Soft, nondistended. Non-tender.  Incisions c/d/i with some resolving ecchymosis.  Musculoskeletal: moves all four extremities equally; no calf edema or tenderness  Psychological: alert and oriented, answers questions appropriately    Pertinent Labs  Lab Results: personally reviewed.    Prealbumin + CMP + Mg + Phos from today pending    Recent Results (from the past 48 hour(s))   Glucose by meter    Collection Time: 07/15/23  8:23 AM   Result Value Ref Range    GLUCOSE BY METER POCT 154 (H) 70 - 99 mg/dL   Platelet count    Collection Time: 07/16/23  5:49 AM   Result Value Ref Range    Platelet Count 397 150 - 450 10e3/uL   Magnesium    Collection Time: 07/16/23  5:49 AM   Result Value Ref Range    Magnesium 2.2 1.7 - 2.3 mg/dL   Basic metabolic panel    Collection Time: 07/16/23  5:49 AM   Result Value Ref Range    Sodium 133 (L) 136 - 145 mmol/L    Potassium 4.2 3.4 - 5.3 mmol/L    Chloride 99 98 - 107 mmol/L    Carbon Dioxide (CO2) 25 22 - 29 mmol/L    Anion Gap 9 7 - 15 mmol/L    Urea Nitrogen 13.7 8.0 - 23.0 mg/dL    Creatinine 0.86  0.67 - 1.17 mg/dL    Calcium 8.5 (L) 8.6 - 10.0 mg/dL    Glucose 118 (H) 70 - 99 mg/dL    GFR Estimate >90 >60 mL/min/1.73m2   Glucose by meter    Collection Time: 07/16/23 12:13 PM   Result Value Ref Range    GLUCOSE BY METER POCT 138 (H) 70 - 99 mg/dL   Glucose by meter    Collection Time: 07/16/23  4:17 PM   Result Value Ref Range    GLUCOSE BY METER POCT 134 (H) 70 - 99 mg/dL   INR    Collection Time: 07/17/23  6:48 AM   Result Value Ref Range    INR 1.25 (H) 0.85 - 1.15       Assessment/Plan: This is a 58 year old male POD #14 s/p robotic extended left colectomy for colon cancer.    - Continuing to have BMs and passing gas. Passed gravity trial yesterday and was given sips/ice chips for comfort. Continuing to do well today.  - Will discuss with Dr Valadez whether to advance him to CLD. Will leave on sips for now.  - Continue IVF/TPN until patient is able to tolerate a full diet.  - Continue protonix.   - Continue lovenox at 40mg only daily.    Will d/w Dr Rory Couch MD  Colon and Rectal Surgery Fellow    Please page the colon & rectal surgery on-call provider with any questions or concerns.  Office: [phone]    Colorectal Surgery Staff:  I have seen and examined the patient. I agree with the above documentation and plan of the fellow/PA above with the following additions/chages:    Dayday feels really good today. States he is passing gas. He told me he did not have a BM yesterday.    Afebrile. . No hypotension    NG out    Abdomen soft. Not distended. This looks back to his baseline (this is what he reports as well).    Plan as above  Will do gastrograffin challenge to make sure there is no transition point and things are moving through.    Diet pending above testing  I will call Chrissy today     John Paul Valadez MD SEAN  [facility]  Office: [phone]  7/17/2023 8:00 AM     Addendum: This carries a diagnosis of class III obesity secondary to his BMI of 44.     John Paul Valadez  [facility]  [facility]  Office: [phone]  7/17/2023 2:09 PM

## 2023-07-17 NOTE — PLAN OF CARE
Problem: Surgery Nonspecified  Goal: Effective Bowel Elimination  Outcome: Progressing     Problem: Surgery Nonspecified  Goal: Fluid and Electrolyte Balance  Outcome: Progressing   Goal Outcome Evaluation:         Alert and oriented.   Denies pain, nausea/vomiting and any other complain.     NPO.   Gastrografin challenge today. Xray scheduled for the evening.   Per pt report he is still passing gas. Several loose stools today.    Plan to change TPN to cyclic tonight.   TPN/Lipids and IVF running.     Pt had a temp of 100.9 F this evening. Recheck temp 99.6. Tom CAMPOS updated. No new orders. Continue to monitor.     Up ambulating in the hallways with assist x 1, walker and gait belt.     PICC line dressing changed today.   Pt had a shower today as well.

## 2023-07-17 NOTE — PLAN OF CARE
Problem: Plan of Care - These are the overarching goals to be used throughout the patient stay.    Goal: Absence of Hospital-Acquired Illness or Injury  Intervention: Prevent Skin Injury  Recent Flowsheet Documentation  Body Position: position changed independently   Goal Outcome Evaluation:    NG d/c'ed at 1900. 200cc out of NG into  salas bag after 8hours. No N/V or other discomfort.  NG to suction for 10 minutes yielded approx 80ccs.  NG d/c'ed per order.  Patient tolerated this well.  Patient in good spirits.  Called Mother and friends to let them know his NG is out.    TPN/Lipids/1/2NS nfusing.  Patient continues to deny discomfort. Resting comfortably. Continue to monitor.

## 2023-07-17 NOTE — PLAN OF CARE
Problem: Surgery Nonspecified  Goal: Nausea and Vomiting Relief  Outcome: Progressing     Problem: Surgery Nonspecified  Goal: Effective Urinary Elimination  Outcome: Progressing     Problem: Parenteral Nutrition  Goal: Effective Intravenous Nutrition Therapy Delivery  Outcome: Progressing     Problem: Plan of Care - These are the overarching goals to be used throughout the patient stay.    Goal: Optimal Comfort and Wellbeing  Outcome: Progressing   Goal Outcome Evaluation:       Denied pain overnight. Denied N/V. Tolerating TPN/lipids/IVFs well. Voiding well. Ambulated in hallway overnight. Slept intermittently between the bed and recliner chair.

## 2023-07-17 NOTE — PHARMACY-CONSULT NOTE
"Pharmacy Note: Parenteral Nutrition (PN) Management    Pharmacist consulted to dose PN for Dayday Silva, a 58 year old male by Dr. Rory MD.    Subjective:    The patient is a new PN start.    The patient was started on PN in the hospital on 07/10/2023.    Indication for PN therapy: bowel obstruction    Inadequate nutrition anticipated for > 7 days.     Enteral nutrition contraindicated due to: small bowel obstruction.      Social History     Tobacco Use     Smoking status: Former     Packs/day: 1.00     Years: 32.00     Pack years: 32.00     Types: Cigarettes     Start date: 1984     Quit date: 2016     Years since quittin.5     Smokeless tobacco: Former   Vaping Use     Vaping Use: Never used   Substance Use Topics     Alcohol use: Yes     Comment: occasional     Drug use: No     Objective:    Ht Readings from Last 1 Encounters:   22 1.651 m (5' 5\")     Wt Readings from Last 1 Encounters:   07/15/23 114.2 kg (251 lb 11.2 oz)       Body mass index is 41.89 kg/m .    Patient Vitals for the past 96 hrs:   Weight   07/15/23 0953 114.2 kg (251 lb 11.2 oz)   23 0902 113 kg (249 lb 3.2 oz)   23 1114 114.2 kg (251 lb 11.2 oz)       Labs:  Last 3 days:  Recent Labs     07/15/23  0547 23  0549 23  0648   * 133* 133*   POTASSIUM 4.1 4.2 4.2   CHLORIDE 97* 99 98   CO2 25 25 24   BUN 14.6 13.7 14.0   CR 0.87 0.86 0.82   REX 9.0 8.5* 9.0   MAG 2.1 2.2 2.1   PHOS  --   --  3.8   PROTTOTAL  --   --  7.3   ALBUMIN  --   --  2.5*   PLT  --  397  --    BILITOTAL  --   --  1.1   AST  --   --  86*   ALT  --   --  121*   ALKPHOS  --   --  170*   INR  --   --  1.25*       Glucose (past 48 hours):   Recent Labs     23  0549 23  1213 23  1617 23  0648   * 138* 134* 118*       Intake/Output (last 24 hours): I/O last 3 completed shifts:  In: 657 [I.V.:657]  Out: 1100 [Urine:800; Emesis/NG output:300]    Estimated CrCl: Estimated Creatinine Clearance: 113.3 " mL/min (based on SCr of 0.82 mg/dL).    Assessment:    Initiate patient on PN therapy as a cyclic central therapy.     Given the patient's current condition/oral intake, PN is still indicated.    Lab results reviewed: Patient is beginning  to increase to CLD  - elevated LFTs- switched to cyclic TPN. Recheck LFT's with am labs. Also, triglycerides with am labs in an effort to help determine if lipids may be contributing to the elevated LFTs.  - Sodium levels remained steadily low, perhaps due to the D51/2 NS running at 30 mL/hr, increased sodium in TPN to 115 mEq/day  - Decrease potassium to 70 mEq/day  - Corrected calcium =10.2. Decrease calcium to 8 mEq  -  Decrease magnesium to 10 mEq       Plan:  Rate of PN: Start rate at 88 mL/hr for 1 hour, then increase rate to 175 mL/hr for 12 hours, then decrease rate to 88 mL/hr for 1 hour, then stop Maintenance IV fluid rate will be adjusted appropriately to meet the total maximum IV fluids rate as ordered by provider.  1. Formula:     Amino Acids 114 grams    Dextrose 342 grams    Sodium 115 mEq/day    Potassium 70 mEq/day    Calcium 8 mEq/day    Magnesium 10 mEq/day    Phosphorus 24 mMol/day    Chloride: Acetate Ratio 1:1    Standard Multivitamins w/Vitamin K    Trace Elements  2. Fat Emulsion: 20%, 250 mL IV daily   3. Check BMP and TPN panel labs tomorrow.  4. Pharmacist will continue to follow the patient's lab results, clinical status and blood glucose results and make adjustments as appropriate.    Thank you for the consult.  Jane Barrett, Pharmacy Student  7/17/2023 10:27 AM

## 2023-07-17 NOTE — PROGRESS NOTES
"CLINICAL NUTRITION SERVICES - REASSESSMENT NOTE     Nutrition Prescription    RECOMMENDATIONS FOR MDs/PROVIDERS TO ORDER:    Recommendations already ordered by Registered Dietitian (RD):  Cycle Custom TPN for liver res,t as LFT's starting to elevate.    114 gm AA, 342 mL Dextrose at 88 mL x 1 hour, 175 mL x 12 hours, and 88 mL x 1 hour.  Continue 250 mL 20% lipid x 12 hours daily.    (Receives IVF continuously =720 mL/d)    Provides 2276 mL, 229 calories, 114 gm protein, 342 gm cho and 50 gm fat per day.    Ordered triglyceride level today when lipids not running.  Triglycerides came back this afternoon 115 WDL.    Will ask Nursing to ramp down today's TPN - at 1600 ramp down to half the rate for one hour and off until new bag hung at 2000, for start of cyclic feeding.    Future/Additional Recommendations:  Monitor TPN tolerance, labs, weight, diet adv, poc     EVALUATION OF THE PROGRESS TOWARD GOALS   Diet: NPO  Nutrition Support: TPN: clinimix: ( 5/15) @ 95 ml/hr plus 250 ml of 20% lipids daily providin ml, 2119 Calories, 114 g protein, 342 g CHO and 50 g fat per day  IVF sodium chloride 0.45% @ 30 ml/hr    Discussed cycling TPN with the patient.  Pt says when he is allowed clear liquids he doesn't like broth.  Pt would like Ensure Clear.  He likes sweet flavors.     NEW FINDINGS   Per chart  I/o inaccurate does not include tpn  NG to LIS  300 mL out last 24 hours  Urine 800 mL out last 24 hours  Pt is has flatus/stooling  Last BM yesterday. (loose, soft, brown)    Per Surgery pt can have sips and chips. Continue TPN/IVF  Gastrografin study today.    LABS  Reviewed: Na 133,  Glu 118, Alk Phos 170 H,  H, AST 86 H    MEDICATIONS  Reviewed: Pantoprazole, IV fluids 0.45% NaCl at 30 mL/hr    ANTHROPOMETRICS  Height: 5'5\"  Most Recent Weight: 114.2 kg (251 lb 11.2 oz) 7/15/23  Date/Time Weight Weight Method   07/15/23 0953 114.2 kg (251 lb 11.2 oz) Standing scale   23 0902 113 kg (249 lb 3.2 oz) " Standing scale   07/13/23 1114 114.2 kg (251 lb 11.2 oz) Standing scale   07/11/23 0930 116.2 kg (256 lb 2.8 oz) Standing scale   07/04/23 0949 116.8 kg (257 lb 9.6 oz) Standing scale   07/03/23 0500 121.1 kg (267 lb) (possibly stated)       Malnutrition Diagnosis: Patient does not meet two of the above criteria necessary for diagnosing malnutrition    CURRENT NUTRITION DIAGNOSIS  Inadequate oral intake related to acute illness as evidenced by NPO x 7 days and need for TPN      INTERVENTIONS  Implementation  Cycle Custom TPN    Goals  Tolerate TPN-met  Meet nutritional needs =met ( continue to follow fluid needs)  Electrolytes WNL-Na is low    Monitoring/Evaluation  Progress toward goals will be monitored and evaluated per protocol.

## 2023-07-18 ENCOUNTER — APPOINTMENT (OUTPATIENT)
Dept: OCCUPATIONAL THERAPY | Facility: HOSPITAL | Age: 58
DRG: 330 | End: 2023-07-18
Attending: COLON & RECTAL SURGERY
Payer: COMMERCIAL

## 2023-07-18 ENCOUNTER — APPOINTMENT (OUTPATIENT)
Dept: PHYSICAL THERAPY | Facility: HOSPITAL | Age: 58
DRG: 330 | End: 2023-07-18
Attending: COLON & RECTAL SURGERY
Payer: COMMERCIAL

## 2023-07-18 LAB
ALT SERPL W P-5'-P-CCNC: 141 U/L (ref 0–70)
ANION GAP SERPL CALCULATED.3IONS-SCNC: 10 MMOL/L (ref 7–15)
AST SERPL W P-5'-P-CCNC: 101 U/L (ref 0–45)
BUN SERPL-MCNC: 16.3 MG/DL (ref 8–23)
CALCIUM SERPL-MCNC: 9 MG/DL (ref 8.6–10)
CALCIUM, IONIZED MEASURED: 1.13 MMOL/L (ref 1.11–1.3)
CHLORIDE SERPL-SCNC: 100 MMOL/L (ref 98–107)
CREAT SERPL-MCNC: 0.82 MG/DL (ref 0.67–1.17)
DEPRECATED HCO3 PLAS-SCNC: 23 MMOL/L (ref 22–29)
GFR SERPL CREATININE-BSD FRML MDRD: >90 ML/MIN/1.73M2
GLUCOSE BLDC GLUCOMTR-MCNC: 105 MG/DL (ref 70–99)
GLUCOSE BLDC GLUCOMTR-MCNC: 112 MG/DL (ref 70–99)
GLUCOSE SERPL-MCNC: 125 MG/DL (ref 70–99)
ION CA PH 7.4: 1.11 MMOL/L (ref 1.11–1.3)
MAGNESIUM SERPL-MCNC: 2.2 MG/DL (ref 1.7–2.3)
PH: 7.38 (ref 7.35–7.45)
PHOSPHATE SERPL-MCNC: 3.6 MG/DL (ref 2.5–4.5)
POTASSIUM SERPL-SCNC: 4.4 MMOL/L (ref 3.4–5.3)
SODIUM SERPL-SCNC: 133 MMOL/L (ref 136–145)
TRIGL SERPL-MCNC: 126 MG/DL

## 2023-07-18 PROCEDURE — 97116 GAIT TRAINING THERAPY: CPT | Mod: GP

## 2023-07-18 PROCEDURE — 84100 ASSAY OF PHOSPHORUS: CPT | Performed by: COLON & RECTAL SURGERY

## 2023-07-18 PROCEDURE — 120N000001 HC R&B MED SURG/OB

## 2023-07-18 PROCEDURE — B4185 PARENTERAL SOL 10 GM LIPIDS: HCPCS | Mod: JZ | Performed by: COLON & RECTAL SURGERY

## 2023-07-18 PROCEDURE — 250N000009 HC RX 250: Mod: JZ | Performed by: COLON & RECTAL SURGERY

## 2023-07-18 PROCEDURE — 250N000013 HC RX MED GY IP 250 OP 250 PS 637

## 2023-07-18 PROCEDURE — 999N000157 HC STATISTIC RCP TIME EA 10 MIN

## 2023-07-18 PROCEDURE — 82330 ASSAY OF CALCIUM: CPT | Performed by: COLON & RECTAL SURGERY

## 2023-07-18 PROCEDURE — 83735 ASSAY OF MAGNESIUM: CPT | Performed by: COLON & RECTAL SURGERY

## 2023-07-18 PROCEDURE — 250N000011 HC RX IP 250 OP 636: Mod: JZ | Performed by: COLON & RECTAL SURGERY

## 2023-07-18 PROCEDURE — 97110 THERAPEUTIC EXERCISES: CPT | Mod: GP

## 2023-07-18 PROCEDURE — 84478 ASSAY OF TRIGLYCERIDES: CPT | Performed by: COLON & RECTAL SURGERY

## 2023-07-18 PROCEDURE — 97535 SELF CARE MNGMENT TRAINING: CPT | Mod: GO

## 2023-07-18 PROCEDURE — C9113 INJ PANTOPRAZOLE SODIUM, VIA: HCPCS | Mod: JZ | Performed by: COLON & RECTAL SURGERY

## 2023-07-18 PROCEDURE — 84450 TRANSFERASE (AST) (SGOT): CPT | Performed by: COLON & RECTAL SURGERY

## 2023-07-18 PROCEDURE — 97110 THERAPEUTIC EXERCISES: CPT | Mod: GO

## 2023-07-18 PROCEDURE — 84460 ALANINE AMINO (ALT) (SGPT): CPT | Performed by: COLON & RECTAL SURGERY

## 2023-07-18 PROCEDURE — 80048 BASIC METABOLIC PNL TOTAL CA: CPT | Performed by: COLON & RECTAL SURGERY

## 2023-07-18 RX ADMIN — MIRABEGRON 50 MG: 25 TABLET, FILM COATED, EXTENDED RELEASE ORAL at 16:20

## 2023-07-18 RX ADMIN — PANTOPRAZOLE SODIUM 40 MG: 40 INJECTION, POWDER, FOR SOLUTION INTRAVENOUS at 09:12

## 2023-07-18 RX ADMIN — LORAZEPAM 0.5 MG: 0.5 TABLET ORAL at 20:16

## 2023-07-18 RX ADMIN — HYDROXYZINE HYDROCHLORIDE 50 MG: 50 TABLET ORAL at 21:39

## 2023-07-18 RX ADMIN — ENOXAPARIN SODIUM 40 MG: 40 INJECTION SUBCUTANEOUS at 09:16

## 2023-07-18 RX ADMIN — DOXEPIN 3 MG: 3 TABLET, FILM COATED ORAL at 21:40

## 2023-07-18 RX ADMIN — DOXAZOSIN 2 MG: 1 TABLET ORAL at 20:16

## 2023-07-18 RX ADMIN — MELOXICAM 7.5 MG: 7.5 TABLET ORAL at 09:07

## 2023-07-18 RX ADMIN — LORAZEPAM 0.5 MG: 0.5 TABLET ORAL at 09:07

## 2023-07-18 RX ADMIN — GABAPENTIN 100 MG: 100 CAPSULE ORAL at 21:39

## 2023-07-18 RX ADMIN — GABAPENTIN 100 MG: 100 CAPSULE ORAL at 16:20

## 2023-07-18 RX ADMIN — Medication 81 MG: at 09:07

## 2023-07-18 RX ADMIN — ARIPIPRAZOLE 30 MG: 15 TABLET ORAL at 21:39

## 2023-07-18 RX ADMIN — MELOXICAM 7.5 MG: 7.5 TABLET ORAL at 18:28

## 2023-07-18 RX ADMIN — OLIVE OIL AND SOYBEAN OIL 250 ML: 16; 4 INJECTION, EMULSION INTRAVENOUS at 20:19

## 2023-07-18 RX ADMIN — MAGNESIUM SULFATE HEPTAHYDRATE: 500 INJECTION, SOLUTION INTRAMUSCULAR; INTRAVENOUS at 20:18

## 2023-07-18 RX ADMIN — FLUTICASONE PROPIONATE 2 SPRAY: 50 SPRAY, METERED NASAL at 10:08

## 2023-07-18 RX ADMIN — FLUOXETINE 60 MG: 20 CAPSULE ORAL at 09:06

## 2023-07-18 RX ADMIN — GABAPENTIN 100 MG: 100 CAPSULE ORAL at 09:07

## 2023-07-18 ASSESSMENT — ACTIVITIES OF DAILY LIVING (ADL)
ADLS_ACUITY_SCORE: 38
ADLS_ACUITY_SCORE: 36
ADLS_ACUITY_SCORE: 38
ADLS_ACUITY_SCORE: 36
ADLS_ACUITY_SCORE: 38
ADLS_ACUITY_SCORE: 36
ADLS_ACUITY_SCORE: 36

## 2023-07-18 NOTE — PROGRESS NOTES
Colon and Rectal Surgery  Daily Progress Note    Subjective  Gastrograffin challenge yesterday showed gas distended segments of small bowel, and passage of contrast freely into the colon.    Patient reports feeling great this morning. Continues to pass flatus and stools. Tolerating sips of clear liquids without nausea or vomiting. Denies any abdominal pain or bloating. States he feels hungry. Did have temp of 100.9 overnight now normal. Ongoing tachycardia . BP stable    Objective  Intake/Output last 24 hrs:    Intake/Output Summary (Last 24 hours) at 7/18/2023 0835  Last data filed at 7/18/2023 0620  Gross per 24 hour   Intake 6246.42 ml   Output 950 ml   Net 5296.42 ml     Temp:  [97.9  F (36.6  C)-100.9  F (38.3  C)] 97.9  F (36.6  C)  Pulse:  [] 98  Resp:  [18-19] 18  BP: (134-169)/(65-82) 140/72  SpO2:  [91 %-94 %] 92 %    Physical Exam:  General: awake, alert, lying in bed, in no acute distress  Head: normocephalic, atraumatic  Respiratory: non-labored breathing  Abdomen: obese, soft, non tender, non-distended              Incisions: clean, dry and intact. Some resolving ecchymosis  Skin: No rashes or lesions  Musculoskeletal: moves all four extremities equally  Psychological: alert and oriented, answers questions appropriately    Pertinent Labs  Lab Results: personally reviewed.  Lab Results   Component Value Date     07/18/2023     07/17/2023     07/16/2023    CO2 23 07/18/2023    CO2 24 07/17/2023    CO2 25 07/16/2023    CO2 24 02/15/2022    CO2 26 09/21/2020    CO2 24 01/09/2020    BUN 16.3 07/18/2023    BUN 14.0 07/17/2023    BUN 13.7 07/16/2023    BUN 12 02/15/2022    BUN 12 09/21/2020    BUN 10 01/09/2020     Lab Results   Component Value Date    WBC 12.6 07/13/2023    WBC 12.5 07/12/2023    WBC 14.1 07/11/2023    HGB 10.9 07/13/2023    HGB 11.5 07/12/2023    HGB 12.0 07/11/2023    HCT 35.5 07/13/2023    HCT 36.5 07/12/2023    HCT 38.1 07/11/2023    MCV 92 07/13/2023    MCV  91 07/12/2023    MCV 93 07/11/2023     07/16/2023     07/13/2023     07/12/2023       Assessment/Plan: This is a 58 year old male POD #15 s/p robotic extended left colectomy for colon cancer    - OK for clear liquids  - Stop supplemental IV fluids  - Continue TPN until tolerating a low fiber diet  - Continue protonix  - Lovenox 40mg daily  - OOB/Ambulate      Discussed with Dr. Rory Garcia PA-C  [facility]  [phone]..............................main    Colorectal Surgery Staff:  I have seen and examined the patient. I agree with the above documentation and plan of the fellow/PA above with the following additions/chages:    Dayday feels great today. He really wants to eat. He is passing gas and stool. No pain.     He is afebrile.  He still is tachycardic in the low 100s.  His abdomen is soft.  It is much less distended.  I think he is back at his baseline.  His incisions are clean, dry, and intact.    I reviewed the Gastrografin enema.  There are still some dilated loops of small bowel but there is contrast all the way through to the rectum indicating that things are open.    I agree with the assessment and plan as above which was formulated with me.  We are going to go fairly slowly and likely do fulls on Wednesday and may be low fiber that evening with an anticipated discharge date of Thursday.  I updated his mother Chrissy about this.    John Paul Valadez MD MBA  [facility]  Office: [phone]  7/18/2023 6:13 PM

## 2023-07-18 NOTE — PLAN OF CARE
Problem: Plan of Care - These are the overarching goals to be used throughout the patient stay.    Goal: Plan of Care Review  Description: The Plan of Care Review/Shift note should be completed every shift.  The Outcome Evaluation is a brief statement about your assessment that the patient is improving, declining, or no change.  This information will be displayed automatically on your shift note.  Outcome: Progressing   Goal Outcome Evaluation:    Alert and oriented. A1 GB walker. Denies pain and nausea. Tachy- -110. Intermittently incontinent of bowel and bladder. Had 2 loose/watery stools. Extrnal male catheter in place overnight. CPAP on overnight. NPO.     Cyclic TPN running through PICC.

## 2023-07-18 NOTE — PLAN OF CARE
Problem: Surgery Nonspecified  Goal: Nausea and Vomiting Relief  Outcome: Progressing   Goal Outcome Evaluation:         Alert and oriented.     Denies pain, nausea and vomiting.   Diet advanced to Clears and pt tolerating well so far.     Passing gas and bowel movements.     Up ambulating with assist x 1 walker and gait belt. Working with PT/OT.  IS up to 750-1000.

## 2023-07-18 NOTE — PROGRESS NOTES
"CLINICAL NUTRITION SERVICES - REASSESSMENT NOTE     Nutrition Prescription    RECOMMENDATIONS FOR MDs/PROVIDERS TO ORDER:    Recommendations already ordered by Registered Dietitian (RD):  Continue cyclic TPN regimen of Dex 342  @ 88 ml/hr x 1 hr,  175 ml/hr x 12 hrs, 88 ml/hr x 1 hr and off.  250 ml of clinolipid daily  Over 12 hrs.  Providing 2119 Calories, 114 g Protein, 342 g CHO, 50  Grams of fat and 2276 ml/day ( TPN)    Future/Additional Recommendations:  Monitor TPN tolerance, diet advance, labs, weight, poc     EVALUATION OF THE PROGRESS TOWARD GOALS   Diet: NPO  Nutrition Support: Cyclic TPN as documented above  IVF: sodium Chloride 0.45% @ 30 ml/hr     ANTHROPOMETRICS  Height: 5'5\"  Most Recent Weight: 113.5 kg (250 lb 4.8 oz)    Weight History:   Wt Readings from Last 10 Encounters:   07/17/23 113.5 kg (250 lb 4.8 oz)   12/01/22 117.9 kg (260 lb)   02/15/22 112.2 kg (247 lb 4 oz)   03/09/20 103.9 kg (229 lb 2 oz)   01/09/20 103.7 kg (228 lb 9.6 oz)   10/16/19 103.4 kg (228 lb)   07/03/19 100.2 kg (221 lb)   03/25/19 94.1 kg (207 lb 6.4 oz)   01/25/19 93.8 kg (206 lb 12.8 oz)   01/15/19 92.1 kg (203 lb 1.6 oz)     GI CONCERNS  Watery loose brown stool-last BM 7/17/2023 per nurse  Abdomen rounded  Faint hypoactive BS LLQ and RLQ, BS not audible in LUQ and RUQ  I/O: 4403/700 ( 7/17)    LABS  Reviewed: ,     MEDICATIONS  Reviewed: doxazosin, doxipine, meloxican, mirabegron, pantoprazole    CURRENT NUTRITION DIAGNOSIS  Inadequate oral intake related to acute illness as evidenced by NPO x 7 days and need for TPN      INTERVENTIONS  Implementation  Continue current cyclic TPN/lipids    Goals  Tolerate TPN-progressing  Meet nutritional needs-met ( continue to follow fluid needs)  Electrolytes WNL- no labs today    Monitoring/Evaluation  Progress toward goals will be monitored and evaluated per protocol.  "

## 2023-07-18 NOTE — PHARMACY-CONSULT NOTE
"Pharmacy Note: Parenteral Nutrition (PN) Management    Pharmacist consulted to dose PN for Dayday Silva, a 58 year old male by Dr. EFREN Valadez MD.    Subjective:    The patient is a new PN start.    The patient was started on PN in the hospital on 7/10/23.    Indication for PN therapy: bowel obstruction    Inadequate nutrition existing for > 7 days.     Enteral nutrition contraindicated due to: small bowel obstruction.    Pertinent diseases and other special considerations none    Social History     Tobacco Use     Smoking status: Former     Packs/day: 1.00     Years: 32.00     Pack years: 32.00     Types: Cigarettes     Start date: 1984     Quit date: 2016     Years since quittin.5     Smokeless tobacco: Former   Vaping Use     Vaping Use: Never used   Substance Use Topics     Alcohol use: Yes     Comment: occasional     Drug use: No     Objective:    Ht Readings from Last 1 Encounters:   22 1.651 m (5' 5\")     Wt Readings from Last 1 Encounters:   23 113.5 kg (250 lb 4.8 oz)       Body mass index is 41.65 kg/m .    Patient Vitals for the past 96 hrs:   Weight   23 1240 113.5 kg (250 lb 4.8 oz)   07/15/23 0953 114.2 kg (251 lb 11.2 oz)       Labs:  Last 3 days:  Recent Labs     23  0549 23  0648 23  1201 23  0611 23  0919   * 133*  --  133*  --    POTASSIUM 4.2 4.2  --  4.4  --    CHLORIDE 99 98  --  100  --    CO2 25 24  --  23  --    BUN 13.7 14.0  --  16.3  --    CR 0.86 0.82  --  0.82  --    REX 8.5* 9.0  --  9.0  --    KARGYCM10  --   --   --   --  1.11   MAG 2.2 2.1  --  2.2  --    PHOS  --  3.8  --  3.6  --    PROTTOTAL  --  7.3  --   --   --    ALBUMIN  --  2.5*  --   --   --    PREALB  --  8*  --   --   --    TRIG  --   --  115 126  --      --   --   --   --    BILITOTAL  --  1.1  --   --   --    AST  --  86*  --  101*  --    ALT  --  121*  --  141*  --    ALKPHOS  --  170*  --   --   --    INR  --  1.25*  --   --   --        Glucose " (past 48 hours):   Recent Labs     07/16/23  1213 07/16/23  1617 07/17/23  0648 07/18/23  0611 07/18/23  0617   * 134* 118* 125* 112*       Intake/Output (last 24 hours): I/O last 3 completed shifts:  In: 6246.42 [P.O.:390; I.V.:23]  Out: 950 [Urine:950]    Estimated CrCl: Estimated Creatinine Clearance: 112.9 mL/min (based on SCr of 0.82 mg/dL).    Assessment:    Continue patient on PN therapy as a cyclic central therapy.     Given the patient's current condition/oral intake, PN is still indicated.    Lab results reviewed:   Na - stagnant, will increase further in TPN today  K - decreased yesterday, but higher level today. Will decrease further in TPN today  Mg - stable at high end of range, will decrease a little further    Glucose = less than 140 without insulin    Plan:  1. Rate of PN: Start rate at 88 mL/hr for 1 hour, then increase rate to 175 mL/hr for 12 hours, then decrease rate to 88 mL/hr for 1 hour, for 14 hour cycle  2. Formula:     Amino Acids 114 grams    Dextrose 342 grams    Sodium 130 mEq/day    Potassium 60 mEq/day    Calcium 8 mEq/day    Magnesium 8 mEq/day    Phosphorus 24 mMol/day    Chloride: Acetate Ratio 1:1    Standard Multivitamins w/Vitamin K    Trace Elements  3. Fat Emulsion: 20%, 250 mL IV Daily   4. Check BMP and Mg, Phos, ALT/AST, Triglycerides labs tomorrow.  5. Pharmacist will continue to follow the patient's lab results, clinical status and blood glucose results and make adjustments as appropriate.    Thank you for the consult.  Niecy Dennis, [facility]  7/18/2023 10:09 AM

## 2023-07-19 ENCOUNTER — APPOINTMENT (OUTPATIENT)
Dept: PHYSICAL THERAPY | Facility: HOSPITAL | Age: 58
DRG: 330 | End: 2023-07-19
Attending: COLON & RECTAL SURGERY
Payer: COMMERCIAL

## 2023-07-19 ENCOUNTER — APPOINTMENT (OUTPATIENT)
Dept: OCCUPATIONAL THERAPY | Facility: HOSPITAL | Age: 58
DRG: 330 | End: 2023-07-19
Attending: COLON & RECTAL SURGERY
Payer: COMMERCIAL

## 2023-07-19 LAB
ALT SERPL W P-5'-P-CCNC: 129 U/L (ref 0–70)
ANION GAP SERPL CALCULATED.3IONS-SCNC: 10 MMOL/L (ref 7–15)
AST SERPL W P-5'-P-CCNC: 83 U/L (ref 0–45)
BUN SERPL-MCNC: 13.8 MG/DL (ref 8–23)
CALCIUM SERPL-MCNC: 8.4 MG/DL (ref 8.6–10)
CHLORIDE SERPL-SCNC: 100 MMOL/L (ref 98–107)
CREAT SERPL-MCNC: 0.8 MG/DL (ref 0.67–1.17)
DEPRECATED HCO3 PLAS-SCNC: 24 MMOL/L (ref 22–29)
GFR SERPL CREATININE-BSD FRML MDRD: >90 ML/MIN/1.73M2
GLUCOSE BLDC GLUCOMTR-MCNC: 107 MG/DL (ref 70–99)
GLUCOSE BLDC GLUCOMTR-MCNC: 111 MG/DL (ref 70–99)
GLUCOSE BLDC GLUCOMTR-MCNC: 140 MG/DL (ref 70–99)
GLUCOSE BLDC GLUCOMTR-MCNC: 150 MG/DL (ref 70–99)
GLUCOSE SERPL-MCNC: 117 MG/DL (ref 70–99)
MAGNESIUM SERPL-MCNC: 2.2 MG/DL (ref 1.7–2.3)
PHOSPHATE SERPL-MCNC: 3.9 MG/DL (ref 2.5–4.5)
POTASSIUM SERPL-SCNC: 4.3 MMOL/L (ref 3.4–5.3)
SODIUM SERPL-SCNC: 134 MMOL/L (ref 136–145)
TRIGL SERPL-MCNC: 139 MG/DL

## 2023-07-19 PROCEDURE — 97110 THERAPEUTIC EXERCISES: CPT | Mod: GO

## 2023-07-19 PROCEDURE — 250N000011 HC RX IP 250 OP 636: Mod: JZ | Performed by: COLON & RECTAL SURGERY

## 2023-07-19 PROCEDURE — 97116 GAIT TRAINING THERAPY: CPT | Mod: GP

## 2023-07-19 PROCEDURE — 84478 ASSAY OF TRIGLYCERIDES: CPT | Performed by: COLON & RECTAL SURGERY

## 2023-07-19 PROCEDURE — 97530 THERAPEUTIC ACTIVITIES: CPT | Mod: GP

## 2023-07-19 PROCEDURE — 97535 SELF CARE MNGMENT TRAINING: CPT | Mod: GO

## 2023-07-19 PROCEDURE — 80048 BASIC METABOLIC PNL TOTAL CA: CPT | Performed by: COLON & RECTAL SURGERY

## 2023-07-19 PROCEDURE — 84460 ALANINE AMINO (ALT) (SGPT): CPT | Performed by: COLON & RECTAL SURGERY

## 2023-07-19 PROCEDURE — 84100 ASSAY OF PHOSPHORUS: CPT | Performed by: COLON & RECTAL SURGERY

## 2023-07-19 PROCEDURE — 83735 ASSAY OF MAGNESIUM: CPT | Performed by: COLON & RECTAL SURGERY

## 2023-07-19 PROCEDURE — B4185 PARENTERAL SOL 10 GM LIPIDS: HCPCS | Mod: JZ | Performed by: COLON & RECTAL SURGERY

## 2023-07-19 PROCEDURE — 120N000001 HC R&B MED SURG/OB

## 2023-07-19 PROCEDURE — 84450 TRANSFERASE (AST) (SGOT): CPT | Performed by: COLON & RECTAL SURGERY

## 2023-07-19 PROCEDURE — 250N000013 HC RX MED GY IP 250 OP 250 PS 637

## 2023-07-19 PROCEDURE — 250N000009 HC RX 250: Performed by: COLON & RECTAL SURGERY

## 2023-07-19 PROCEDURE — C9113 INJ PANTOPRAZOLE SODIUM, VIA: HCPCS | Mod: JZ | Performed by: COLON & RECTAL SURGERY

## 2023-07-19 RX ADMIN — PANTOPRAZOLE SODIUM 40 MG: 40 INJECTION, POWDER, FOR SOLUTION INTRAVENOUS at 09:10

## 2023-07-19 RX ADMIN — LORAZEPAM 0.5 MG: 0.5 TABLET ORAL at 09:10

## 2023-07-19 RX ADMIN — ARIPIPRAZOLE 30 MG: 15 TABLET ORAL at 21:46

## 2023-07-19 RX ADMIN — Medication 81 MG: at 09:09

## 2023-07-19 RX ADMIN — HYDROXYZINE HYDROCHLORIDE 50 MG: 50 TABLET ORAL at 21:46

## 2023-07-19 RX ADMIN — GABAPENTIN 100 MG: 100 CAPSULE ORAL at 16:38

## 2023-07-19 RX ADMIN — FLUTICASONE PROPIONATE 2 SPRAY: 50 SPRAY, METERED NASAL at 09:10

## 2023-07-19 RX ADMIN — GABAPENTIN 100 MG: 100 CAPSULE ORAL at 21:46

## 2023-07-19 RX ADMIN — DOXEPIN 3 MG: 3 TABLET, FILM COATED ORAL at 21:46

## 2023-07-19 RX ADMIN — LORAZEPAM 0.5 MG: 0.5 TABLET ORAL at 20:24

## 2023-07-19 RX ADMIN — DOXAZOSIN 2 MG: 1 TABLET ORAL at 20:25

## 2023-07-19 RX ADMIN — MELOXICAM 7.5 MG: 7.5 TABLET ORAL at 17:14

## 2023-07-19 RX ADMIN — MIRABEGRON 50 MG: 25 TABLET, FILM COATED, EXTENDED RELEASE ORAL at 17:14

## 2023-07-19 RX ADMIN — MELOXICAM 7.5 MG: 7.5 TABLET ORAL at 09:10

## 2023-07-19 RX ADMIN — OLIVE OIL AND SOYBEAN OIL 250 ML: 16; 4 INJECTION, EMULSION INTRAVENOUS at 20:19

## 2023-07-19 RX ADMIN — FLUOXETINE 60 MG: 20 CAPSULE ORAL at 09:09

## 2023-07-19 RX ADMIN — ENOXAPARIN SODIUM 40 MG: 40 INJECTION SUBCUTANEOUS at 09:09

## 2023-07-19 RX ADMIN — MAGNESIUM SULFATE HEPTAHYDRATE: 500 INJECTION, SOLUTION INTRAMUSCULAR; INTRAVENOUS at 20:18

## 2023-07-19 RX ADMIN — GABAPENTIN 100 MG: 100 CAPSULE ORAL at 09:10

## 2023-07-19 ASSESSMENT — ACTIVITIES OF DAILY LIVING (ADL)
ADLS_ACUITY_SCORE: 32
ADLS_ACUITY_SCORE: 32
ADLS_ACUITY_SCORE: 36
ADLS_ACUITY_SCORE: 36
ADLS_ACUITY_SCORE: 32
ADLS_ACUITY_SCORE: 36
ADLS_ACUITY_SCORE: 32
ADLS_ACUITY_SCORE: 36

## 2023-07-19 NOTE — PROGRESS NOTES
Care Management Follow Up    Length of Stay (days): 16    Expected Discharge Date: 07/20/2023     Concerns to be Addressed: discharge planning     Patient plan of care discussed at interdisciplinary rounds: Yes    Anticipated Discharge Disposition: Group Home, Home Care     Anticipated Discharge Services: Home Care RN PT OT  Anticipated Discharge DME:  (per treatment team)    Patient/family educated on Medicare website which has current facility and service quality ratings:  (N/A)  Education Provided on the Discharge Plan: Yes  Patient/Family in Agreement with the Plan: yes    Referrals Placed by CM/ALEXANDRO: Homecare  Private pay costs discussed: Not applicable    Additional Information:  Chart reviewed. SW spoke to patient's mother and legal guardian Chrissy. [phone].   Plan will be for patient to return to his group home when medically ready.   Accepted to [facility] for - RN/PT/OT services.   [facility] [phone].        ISHAAN Salazar

## 2023-07-19 NOTE — PROGRESS NOTES
Colon and Rectal Surgery  Daily Progress Note    Subjective  Reports feeling great today and less distended. Tolerating clears without nausea. No abdominal pain. Excited to try cream of wheat. Passing flatus and 4 BMs recorded yesterday. Afebrile and tachycardia improving, 104 bpm this AM.    Objective  Intake/Output last 24 hrs:    Intake/Output Summary (Last 24 hours) at 7/18/2023 0835  Last data filed at 7/18/2023 0620  Gross per 24 hour   Intake 6246.42 ml   Output 950 ml   Net 5296.42 ml     Temp:  [98.4  F (36.9  C)-99  F (37.2  C)] 98.4  F (36.9  C)  Pulse:  [104-112] 104  Resp:  [16-20] 20  BP: (119-137)/(55-72) 119/55  SpO2:  [91 %-95 %] 95 %    Physical Exam:  General: awake, alert, lying in bed, in no acute distress  Head: normocephalic, atraumatic  Respiratory: non-labored breathing  Abdomen: obese, soft, non tender, non-distended              Incisions: clean, dry and intact. Some resolving ecchymosis  Skin: No rashes or lesions  Musculoskeletal: moves all four extremities equally  Psychological: alert and oriented, answers questions appropriately    Pertinent Labs  Lab Results: personally reviewed.  Lab Results   Component Value Date     07/18/2023     07/17/2023     07/16/2023    CO2 23 07/18/2023    CO2 24 07/17/2023    CO2 25 07/16/2023    CO2 24 02/15/2022    CO2 26 09/21/2020    CO2 24 01/09/2020    BUN 16.3 07/18/2023    BUN 14.0 07/17/2023    BUN 13.7 07/16/2023    BUN 12 02/15/2022    BUN 12 09/21/2020    BUN 10 01/09/2020     Lab Results   Component Value Date    WBC 12.6 07/13/2023    WBC 12.5 07/12/2023    WBC 14.1 07/11/2023    HGB 10.9 07/13/2023    HGB 11.5 07/12/2023    HGB 12.0 07/11/2023    HCT 35.5 07/13/2023    HCT 36.5 07/12/2023    HCT 38.1 07/11/2023    MCV 92 07/13/2023    MCV 91 07/12/2023    MCV 93 07/11/2023     07/16/2023     07/13/2023     07/12/2023       Assessment/Plan: This is a 58 year old male POD #16 s/p robotic extended left  colectomy for colon cancer    - OK to advance to full liquids, if tolerating possibly LFD later today  - Continue TPN until tolerating a low fiber diet  - Continue protonix  - Lovenox 40mg daily  - OOB/Ambulate  - Possibly discharge tomorrow if tolerating LFD and weaned off TPN    Discussed with Dr. Rory Lloyd PA-C  [facility]  [phone]..............................main

## 2023-07-19 NOTE — PLAN OF CARE
Problem: Parenteral Nutrition  Goal: Effective Intravenous Nutrition Therapy Delivery  Outcome: Progressing  Intervention: Optimize Intravenous Nutrition Delivery  Recent Flowsheet Documentation  Taken 7/19/2023 1630 by Becky Willoughby, RN  Medication Review/Management: medications reviewed  Taken 7/19/2023 0813 by Becky Willoughby, RN  Medication Review/Management: medications reviewed     Problem: Surgery Nonspecified  Goal: Effective Bowel Elimination  Outcome: Progressing   Goal Outcome Evaluation:    Patient denied having a BM on dayshift and one on eves; Passing flatus.   Advanced to fulls today; tolerated well.  Will advance to low fiber tomorrow. Note but no order left in surgery note.   TPN continued as cyclic.   Up in chair for a couple hours.   Walked in hallway.

## 2023-07-19 NOTE — PROGRESS NOTES
"CLINICAL NUTRITION SERVICES - REASSESSMENT NOTE     Nutrition Prescription    RECOMMENDATIONS FOR MDs/PROVIDERS TO ORDER:    Recommendations already ordered by Registered Dietitian (RD):  Continue cyclic TPN regimen of Dex 342  @ 88 ml/hr x 1 hr,  175 ml/hr x 12 hrs, 88 ml/hr x 1 hr and off.  250 ml of clinolipid daily  Over 12 hrs.  Providing 2119 Calories, 114 g Protein, 342 g CHO, 50  Grams of fat and 2276 ml/day ( TPN)    Future/Additional Recommendations:  Monitor TPN tolerance, diet advance, labs, weight, poc     EVALUATION OF THE PROGRESS TOWARD GOALS   Diet: Clear liquid-started 7/18  Intake: 50% of lunch, 75% of dinner per flowsheets  Nutrition Support: Cyclic TPN as documented above    NEW FINDINGS   Per surgery note yesterday, plan for full liquids today, maybe low fiber tonight     ANTHROPOMETRICS  Height: 5'5\"  Most Recent Weight: 113.5 kg (250 lb 4.8 oz)    Weight History:   Date/Time Weight   07/18/23 1217 113.9 kg (251 lb 3.2 oz)   07/17/23 1240 113.5 kg (250 lb 4.8 oz)   07/15/23 0953 114.2 kg (251 lb 11.2 oz)   07/14/23 0902 113 kg (249 lb 3.2 oz)   07/13/23 1114 114.2 kg (251 lb 11.2 oz)   07/11/23 0930 116.2 kg (256 lb 2.8 oz)     GI CONCERNS  Watery loose brown stool-last BM 7/17/2023 per nurse  I/O: 4893/2250 (7/18)    LABS  Reviewed: Na 134 (L), , AST 83    MEDICATIONS  Reviewed    CURRENT NUTRITION DIAGNOSIS  Inadequate oral intake related to acute illness as evidenced by NPO x 7 days and need for TPN-progressing    INTERVENTIONS  Implementation  Continue current cyclic TPN/lipids    Goals  Tolerate TPN-progressing  Meet nutritional needs-met ( continue to follow fluid needs)  Electrolytes WNL- Na low  Advance diet and tolerate po-new    Monitoring/Evaluation  Progress toward goals will be monitored and evaluated per protocol.  "

## 2023-07-19 NOTE — PLAN OF CARE
Problem: Plan of Care - These are the overarching goals to be used throughout the patient stay.    Goal: Optimal Comfort and Wellbeing  Outcome: Progressing     Problem: Surgery Nonspecified  Goal: Effective Bowel Elimination  Outcome: Progressing  Goal: Nausea and Vomiting Relief  Outcome: Progressing     Problem: Obstructive Sleep Apnea Risk or Actual Comorbidity Management  Goal: Unobstructed Breathing During Sleep  Outcome: Progressing     Goal Outcome Evaluation:         Pt denies pain or nausea. Tolerating clear liquid diet. Had some jello. Up to bathroom with Ax1. On CPAP overnight.

## 2023-07-19 NOTE — PHARMACY-CONSULT NOTE
"Pharmacy Note: Parenteral Nutrition (PN) Management    Pharmacist consulted to dose PN for Dayday Silva, a 58 year old male by Dr. EFREN Valadez MD.    Subjective:    The patient is a new PN start.    The patient was started on PN in the hospital on 07/10/2023.    Indication for PN therapy: bowel obstruction    Inadequate nutrition existing for > 7 days.     Enteral nutrition contraindicated due to: small bowel obstruction.    Pertinent diseases and other special considerations none    Social History     Tobacco Use     Smoking status: Former     Packs/day: 1.00     Years: 32.00     Pack years: 32.00     Types: Cigarettes     Start date: 1984     Quit date: 2016     Years since quittin.5     Smokeless tobacco: Former   Vaping Use     Vaping Use: Never used   Substance Use Topics     Alcohol use: Yes     Comment: occasional     Drug use: No     Objective:    Ht Readings from Last 1 Encounters:   22 1.651 m (5' 5\")     Wt Readings from Last 1 Encounters:   23 113.9 kg (251 lb 3.2 oz)       Body mass index is 41.8 kg/m .    Patient Vitals for the past 96 hrs:   Weight   23 1217 113.9 kg (251 lb 3.2 oz)   23 1240 113.5 kg (250 lb 4.8 oz)   07/15/23 0953 114.2 kg (251 lb 11.2 oz)       Labs:  Last 3 days:  Recent Labs     23  0648 23  1201 23  0611 23  0919 23  0515   *  --  133*  --  134*   POTASSIUM 4.2  --  4.4  --  4.3   CHLORIDE 98  --  100  --  100   CO2 24  --  23  --  24   BUN 14.0  --  16.3  --  13.8   CR 0.82  --  0.82  --  0.80   REX 9.0  --  9.0  --  8.4*   ULVSXRW09  --   --   --  1.11  --    MAG 2.1  --  2.2  --  2.2   PHOS 3.8  --  3.6  --  3.9   PROTTOTAL 7.3  --   --   --   --    ALBUMIN 2.5*  --   --   --   --    PREALB 8*  --   --   --   --    TRIG  --  115 126  --  139   BILITOTAL 1.1  --   --   --   --    AST 86*  --  101*  --  83*   *  --  141*  --  129*   ALKPHOS 170*  --   --   --   --    INR 1.25*  --   --   --   --  "       Glucose (past 48 hours):   Recent Labs     07/18/23  0611 07/18/23  0617 07/18/23  1742 07/19/23  0012 07/19/23  0515 07/19/23  0755   * 112* 105* 140* 117* 150*       Intake/Output (last 24 hours): I/O last 3 completed shifts:  In: 4893.55 [P.O.:1470; I.V.:700]  Out: 2250 [Urine:2250]    Estimated CrCl: Estimated Creatinine Clearance: 116 mL/min (based on SCr of 0.8 mg/dL).    Assessment:    Continue patient on PN therapy as a cyclic central therapy.     Given the patient's current condition/oral intake, PN is still indicated.    Lab results reviewed:  Na = stagnant and low, plan to increase with TPN today  Ca = decreased today below normal, corrected calcium = 9.6 mg/dL. No changes with TPN and will monitor with ionized Ca with AM labs tomorrow    Plan:  1. Rate of PN: Start rate at 88 mL/hr for 1 hour, then increase rate to 175 mL/hr for 12 hours, then decrease rate to 88 mL/hr for 1 hour, for 14 hour cycle.  2. Formula:     Amino Acids 114 grams    Dextrose 342 grams    Sodium 150 mEq/day    Potassium 60 mEq/day    Calcium 8 mEq/day    Magnesium 8 mEq/day    Phosphorus 24 mMol/day    Chloride: Acetate Ratio 1:1    Standard Multivitamins w/Vitamin K    Trace Elements  3. Fat Emulsion: 20%, 250 mL IV daily  4. Check ionized calcium, BMP and Magnesium and Phosphorous labs tomorrow.  5. Pharmacist will continue to follow the patient's lab results, clinical status and blood glucose results and make adjustments as appropriate.    Thank you for the consult.  Jane Barrett, Pharmacy Student  7/19/2023 8:51 AM

## 2023-07-19 NOTE — PLAN OF CARE
Problem: Surgery Nonspecified  Goal: Effective Bowel Elimination  Outcome: Progressing   Active bowel sound.  Problem: Plan of Care - These are the overarching goals to be used throughout the patient stay.    Goal: Optimal Comfort and Wellbeing  Outcome: Progressing   Denies pain.  Problem: Surgery Nonspecified  Goal: Nausea and Vomiting Relief  Outcome: Progressing  Tolerated 100% of his dinner.   Problem: Surgery Nonspecified  Goal: Effective Urinary Elimination  Outcome: Progressing   Voiding well.  Problem: Surgery Nonspecified  Goal: Effective Oxygenation and Ventilation  Outcome: Progressing   CPAP on tonight.  Problem: Parenteral Nutrition  Goal: Effective Intravenous Nutrition Therapy Delivery  Outcome: Progressing  Intervention: Optimize Intravenous Nutrition Delivery  Recent Flowsheet Documentation  Taken 7/18/2023 1600 by Brittaney Darby RN  Medication Review/Management: medications reviewed   TPN and lipid infusing.  Problem: Obstructive Sleep Apnea Risk or Actual Comorbidity Management  Goal: Unobstructed Breathing During Sleep  Outcome: Progressing  Intervention: Monitor and Manage Obstructive Sleep Apnea  Recent Flowsheet Documentation  Taken 7/18/2023 1600 by Brittaney Darby RN  Medication Review/Management: medications reviewed

## 2023-07-20 ENCOUNTER — APPOINTMENT (OUTPATIENT)
Dept: OCCUPATIONAL THERAPY | Facility: HOSPITAL | Age: 58
DRG: 330 | End: 2023-07-20
Attending: COLON & RECTAL SURGERY
Payer: COMMERCIAL

## 2023-07-20 ENCOUNTER — APPOINTMENT (OUTPATIENT)
Dept: PHYSICAL THERAPY | Facility: HOSPITAL | Age: 58
DRG: 330 | End: 2023-07-20
Attending: COLON & RECTAL SURGERY
Payer: COMMERCIAL

## 2023-07-20 LAB
ANION GAP SERPL CALCULATED.3IONS-SCNC: 10 MMOL/L (ref 7–15)
BUN SERPL-MCNC: 13 MG/DL (ref 8–23)
CALCIUM SERPL-MCNC: 8.9 MG/DL (ref 8.6–10)
CALCIUM, IONIZED MEASURED: 1.14 MMOL/L (ref 1.11–1.3)
CHLORIDE SERPL-SCNC: 100 MMOL/L (ref 98–107)
CREAT SERPL-MCNC: 0.78 MG/DL (ref 0.67–1.17)
DEPRECATED HCO3 PLAS-SCNC: 25 MMOL/L (ref 22–29)
GFR SERPL CREATININE-BSD FRML MDRD: >90 ML/MIN/1.73M2
GLUCOSE SERPL-MCNC: 142 MG/DL (ref 70–99)
ION CA PH 7.4: 1.13 MMOL/L (ref 1.11–1.3)
MAGNESIUM SERPL-MCNC: 2.1 MG/DL (ref 1.7–2.3)
PH: 7.38 (ref 7.35–7.45)
PHOSPHATE SERPL-MCNC: 3.9 MG/DL (ref 2.5–4.5)
PLATELET # BLD AUTO: 436 10E3/UL (ref 150–450)
POTASSIUM SERPL-SCNC: 4.4 MMOL/L (ref 3.4–5.3)
SODIUM SERPL-SCNC: 135 MMOL/L (ref 136–145)

## 2023-07-20 PROCEDURE — 97535 SELF CARE MNGMENT TRAINING: CPT | Mod: GO

## 2023-07-20 PROCEDURE — 80048 BASIC METABOLIC PNL TOTAL CA: CPT | Performed by: COLON & RECTAL SURGERY

## 2023-07-20 PROCEDURE — 84100 ASSAY OF PHOSPHORUS: CPT | Performed by: COLON & RECTAL SURGERY

## 2023-07-20 PROCEDURE — 999N000157 HC STATISTIC RCP TIME EA 10 MIN

## 2023-07-20 PROCEDURE — 250N000011 HC RX IP 250 OP 636: Mod: JZ | Performed by: COLON & RECTAL SURGERY

## 2023-07-20 PROCEDURE — 250N000013 HC RX MED GY IP 250 OP 250 PS 637

## 2023-07-20 PROCEDURE — 82330 ASSAY OF CALCIUM: CPT | Performed by: COLON & RECTAL SURGERY

## 2023-07-20 PROCEDURE — 85049 AUTOMATED PLATELET COUNT: CPT | Performed by: COLON & RECTAL SURGERY

## 2023-07-20 PROCEDURE — 83735 ASSAY OF MAGNESIUM: CPT | Performed by: COLON & RECTAL SURGERY

## 2023-07-20 PROCEDURE — 97110 THERAPEUTIC EXERCISES: CPT | Mod: GO

## 2023-07-20 PROCEDURE — 97116 GAIT TRAINING THERAPY: CPT | Mod: GP

## 2023-07-20 PROCEDURE — 120N000001 HC R&B MED SURG/OB

## 2023-07-20 PROCEDURE — C9113 INJ PANTOPRAZOLE SODIUM, VIA: HCPCS | Mod: JZ | Performed by: COLON & RECTAL SURGERY

## 2023-07-20 RX ADMIN — MELOXICAM 7.5 MG: 7.5 TABLET ORAL at 17:22

## 2023-07-20 RX ADMIN — GABAPENTIN 100 MG: 100 CAPSULE ORAL at 21:20

## 2023-07-20 RX ADMIN — MIRABEGRON 50 MG: 25 TABLET, FILM COATED, EXTENDED RELEASE ORAL at 17:23

## 2023-07-20 RX ADMIN — Medication 81 MG: at 08:23

## 2023-07-20 RX ADMIN — PANTOPRAZOLE SODIUM 40 MG: 40 INJECTION, POWDER, FOR SOLUTION INTRAVENOUS at 08:30

## 2023-07-20 RX ADMIN — MELOXICAM 7.5 MG: 7.5 TABLET ORAL at 08:23

## 2023-07-20 RX ADMIN — GABAPENTIN 100 MG: 100 CAPSULE ORAL at 08:27

## 2023-07-20 RX ADMIN — DOXEPIN 3 MG: 3 TABLET, FILM COATED ORAL at 21:19

## 2023-07-20 RX ADMIN — ENOXAPARIN SODIUM 40 MG: 40 INJECTION SUBCUTANEOUS at 08:30

## 2023-07-20 RX ADMIN — LORAZEPAM 0.5 MG: 0.5 TABLET ORAL at 08:24

## 2023-07-20 RX ADMIN — FLUTICASONE PROPIONATE 2 SPRAY: 50 SPRAY, METERED NASAL at 08:28

## 2023-07-20 RX ADMIN — ARIPIPRAZOLE 30 MG: 15 TABLET ORAL at 21:20

## 2023-07-20 RX ADMIN — GABAPENTIN 100 MG: 100 CAPSULE ORAL at 17:22

## 2023-07-20 RX ADMIN — HYDROXYZINE HYDROCHLORIDE 50 MG: 50 TABLET ORAL at 21:20

## 2023-07-20 RX ADMIN — FLUOXETINE 60 MG: 20 CAPSULE ORAL at 08:27

## 2023-07-20 RX ADMIN — DOXAZOSIN 2 MG: 1 TABLET ORAL at 19:55

## 2023-07-20 RX ADMIN — LORAZEPAM 0.5 MG: 0.5 TABLET ORAL at 19:55

## 2023-07-20 ASSESSMENT — ACTIVITIES OF DAILY LIVING (ADL)
ADLS_ACUITY_SCORE: 32

## 2023-07-20 NOTE — PROGRESS NOTES
Colon and Rectal Surgery  Daily Progress Note    Subjective  Patient reports feeling great. Tolerated fulls yesterday, no n/v. Passing gas and 1 stool yesterday. Good urine output. Denies any abdominal pain. Wants to go home today if able. Mildly tachy to 102-104, afebrile.     Objective  Intake/Output last 24 hrs:    Intake/Output Summary (Last 24 hours) at 7/20/2023 0815  Last data filed at 7/20/2023 0637  Gross per 24 hour   Intake 5062.83 ml   Output 1925 ml   Net 3137.83 ml     Temp:  [97.3  F (36.3  C)-98.6  F (37  C)] 97.3  F (36.3  C)  Pulse:  [] 100  Resp:  [20-22] 22  BP: (123-144)/(55-75) 144/61  SpO2:  [94 %-97 %] 97 %    Physical Exam:  General: awake, alert, sitting in chair, in no acute distress  Head: normocephalic, atraumatic  Respiratory: non-labored breathing  Abdomen: soft, non-tender, non-distended              Incisions: clean, dry and intact. Resolving ecchymosis   Skin: No rashes or lesions  Musculoskeletal: moves all four extremities equally  Psychological: alert and oriented, answers questions appropriately    Pertinent Labs  Lab Results: personally reviewed.  Lab Results   Component Value Date     07/20/2023     07/19/2023     07/18/2023    CO2 25 07/20/2023    CO2 24 07/19/2023    CO2 23 07/18/2023    CO2 24 02/15/2022    CO2 26 09/21/2020    CO2 24 01/09/2020    BUN 13.0 07/20/2023    BUN 13.8 07/19/2023    BUN 16.3 07/18/2023    BUN 12 02/15/2022    BUN 12 09/21/2020    BUN 10 01/09/2020     Lab Results   Component Value Date    WBC 12.6 07/13/2023    WBC 12.5 07/12/2023    WBC 14.1 07/11/2023    HGB 10.9 07/13/2023    HGB 11.5 07/12/2023    HGB 12.0 07/11/2023    HCT 35.5 07/13/2023    HCT 36.5 07/12/2023    HCT 38.1 07/11/2023    MCV 92 07/13/2023    MCV 91 07/12/2023    MCV 93 07/11/2023     07/20/2023     07/16/2023     07/13/2023       Assessment/Plan: This is a 58 year old male POD ##17 s/p robotic extended left colectomy for colon  cancer    - Advance to LFD  - Decrease TPN this morning; if tolerating LFD may discontinue this afternoon  - Continue protonix  - Lovenox dvt ppx; will NOT need at discharge  - OOB/Ambulate  - Plan for discharge tomorrow if tolerating LFD and TPN weaned    Discussed with PAMELA NewbyC  [facility]  [phone]..............................main    Colorectal Surgery Staff:  I have seen and examined the patient. I agree with the above documentation and plan of the fellow/PA above with the following additions/chages:    Dayday looks and feels great.  His vital signs and exam is unchanged.  He is tolerating a low fiber diet.  At this point, we can wean his TPN and discharge him home whenever he feels he is up to it.  He would like to go today, but his mother Chrissy would like him to go tomorrow.  We will send her home tomorrow.  He will not go home on any extended VTE chemoprophylaxis.  I will set up follow-up with him in the office.    John Paul Valadez MD MBA  [facility]  Office: [phone]  7/20/2023 1:59 PM

## 2023-07-20 NOTE — PLAN OF CARE
Problem: Plan of Care - These are the overarching goals to be used throughout the patient stay.    Goal: Optimal Comfort and Wellbeing  Outcome: Progressing  Intervention: Provide Person-Centered Care  Recent Flowsheet Documentation  Taken 7/20/2023 0109 by Galileo Barrett RN  Trust Relationship/Rapport:   care explained   choices provided   emotional support provided   empathic listening provided   questions answered   questions encouraged   reassurance provided   thoughts/feelings acknowledged  Taken 7/19/2023 2016 by Galileo Barrett RN  Trust Relationship/Rapport:   care explained   choices provided   emotional support provided   empathic listening provided   questions answered   questions encouraged   reassurance provided   thoughts/feelings acknowledged     Problem: Parenteral Nutrition  Goal: Effective Intravenous Nutrition Therapy Delivery  Outcome: Progressing  Intervention: Optimize Intravenous Nutrition Delivery  Recent Flowsheet Documentation  Taken 7/20/2023 0109 by Galileo Barrett RN  Medication Review/Management: medications reviewed  Taken 7/19/2023 2016 by Galileo Barrett RN  Medication Review/Management: medications reviewed     Goal Outcome Evaluation: Pt denies any pain. TPN started at 88ml for 1 hour and rate change to 175ml/hr. HR running tachy between 100-102. Can make needs be known.

## 2023-07-20 NOTE — PROGRESS NOTES
"CLINICAL NUTRITION SERVICES - REASSESSMENT NOTE     Nutrition Prescription    RECOMMENDATIONS FOR MDs/PROVIDERS TO ORDER:    Recommendations already ordered by Registered Dietitian (RD):  Recommend discontinue lipids today. Continue cyclic TPN of D 342   @ 88 ml/hr x 1 hr, 175 ml/hr x 12 hrs, 88 ml/hr x 1 hr and off  Providing 1619 Calories, 342 g CHO, 114 g protein and 2276 ml/day    Future/Additional Recommendations:  Follow for diet adv and ability to wean TPN, weight, labs, poc     EVALUATION OF THE PROGRESS TOWARD GOALS   Diet: Full liquids  Nutrition Support: cyclic TPN: D 342  @ 88 ml/hr x 1 hr, 175 ml/hr x 12 hrs, 88 ml/hr x 1 hr and off.  250 ml of clinolipid daily providing 2119 Calories, 342 g CHO, 114 g protein, 50 g fat and 2276 ml/day (TPN)  Intake: pt consumed 100% breakfast and lunch yesterday ( 7/19): 905 Calories and 23 g protein ( dinner meal not recorded)       NEW FINDINGS   \" Continue TPN until tolerated low fiber diet\" per VENTURA Marie    ANTHROPOMETRICS  Height: 5'5\"  Most Recent Weight: 114.7 kg (252 lb 14.4 oz)    Weight History:   Wt Readings from Last 10 Encounters:   07/19/23 114.7 kg (252 lb 14.4 oz)   12/01/22 117.9 kg (260 lb)   02/15/22 112.2 kg (247 lb 4 oz)   03/09/20 103.9 kg (229 lb 2 oz)   01/09/20 103.7 kg (228 lb 9.6 oz)   10/16/19 103.4 kg (228 lb)   07/03/19 100.2 kg (221 lb)   03/25/19 94.1 kg (207 lb 6.4 oz)   01/25/19 93.8 kg (206 lb 12.8 oz)   01/15/19 92.1 kg (203 lb 1.6 oz)     GI CONCERNS  Abdomen rounded.  Faint hypoactive BS LLQ and RLQ.  No audible BS in LUQ and RUQ  Passing flatus  Last BM 7/19/2023 ( watery, loose, brown)  I/O: 4504/1825    LABS  Reviewed: Na 135 ( up from 134 7/19), K 4.4, Ca 8.9, Glu 142,  ( 7/19)    MEDICATIONS  Reviewed: cardura, silenor, mobic, myrbetriq, pantoprazole    CURRENT NUTRITION DIAGNOSIS  Inadequate oral intake related to acute illness as evidenced by NPO x 7 days and need for TPN-progressing  "     INTERVENTIONS  Implementation  Parenteral Nutrition/IV Fluids - Recommend discontinue lipids today, continue cyclic regimen of D 342  over 14 hrs     Goals  Tolerate low fiber diet-and wean TPN-not met yet  Meet nutritional needs-progressing  Electrolytes WNL-progressing( Na still slightly low)    Monitoring/Evaluation  Progress toward goals will be monitored and evaluated per protocol.

## 2023-07-20 NOTE — PLAN OF CARE
Problem: Surgery Nonspecified  Goal: Effective Bowel Elimination  Outcome: Progressing   Goal Outcome Evaluation:         Alert and oriented.   Denies pain, nausea and any other complain.     Diet advance to low fiber diet. Tolerating diet well. Per pt report he had only 50% of lunch as he wanted to go slow, but denies pain/discomfort/nausea after eating. Has tolerated low fiber breakfast and lunch so far.   Addendum 1900pm: Pt had cheese burger for dinner and tolerated well.     TPN/lipids discontinued. Saline locked.     Working with PT today.     One small bowel movement today. Passing gas.     Plan to discharge back to his group home tomorrow.

## 2023-07-20 NOTE — PLAN OF CARE
Problem: Obstructive Sleep Apnea Risk or Actual Comorbidity Management  Goal: Unobstructed Breathing During Sleep  Outcome: Progressing  Intervention: Monitor and Manage Obstructive Sleep Apnea  Recent Flowsheet Documentation  Taken 7/20/2023 0109 by Galileo Barrett RN  Medication Review/Management: medications reviewed  Taken 7/19/2023 2016 by Galileo Barrett RN  Medication Review/Management: medications reviewed     Problem: Parenteral Nutrition  Goal: Effective Intravenous Nutrition Therapy Delivery  7/20/2023 0659 by Galileo Barrett RN  Outcome: Progressing  7/20/2023 0148 by Galileo Barrett RN  Outcome: Progressing  Intervention: Optimize Intravenous Nutrition Delivery  Recent Flowsheet Documentation  Taken 7/20/2023 0109 by Galileo Barrett RN  Medication Review/Management: medications reviewed  Taken 7/19/2023 2016 by Galileo Barrett RN  Medication Review/Management: medications reviewed     Goal Outcome Evaluation: Pt denies any pain. Slept through the night. TPN running at 175ml/hr. Lipids running at 20.8ml/hr. HR running tachy between 100-102. Can make needs be known.

## 2023-07-21 VITALS
SYSTOLIC BLOOD PRESSURE: 126 MMHG | DIASTOLIC BLOOD PRESSURE: 69 MMHG | OXYGEN SATURATION: 94 % | BODY MASS INDEX: 42.34 KG/M2 | HEART RATE: 101 BPM | TEMPERATURE: 98.9 F | RESPIRATION RATE: 18 BRPM | WEIGHT: 254.44 LBS

## 2023-07-21 PROCEDURE — 250N000013 HC RX MED GY IP 250 OP 250 PS 637

## 2023-07-21 PROCEDURE — C9113 INJ PANTOPRAZOLE SODIUM, VIA: HCPCS | Mod: JZ | Performed by: COLON & RECTAL SURGERY

## 2023-07-21 PROCEDURE — 250N000011 HC RX IP 250 OP 636: Mod: JZ | Performed by: COLON & RECTAL SURGERY

## 2023-07-21 PROCEDURE — 999N000157 HC STATISTIC RCP TIME EA 10 MIN

## 2023-07-21 RX ADMIN — GABAPENTIN 100 MG: 100 CAPSULE ORAL at 08:53

## 2023-07-21 RX ADMIN — Medication 81 MG: at 08:51

## 2023-07-21 RX ADMIN — FLUTICASONE PROPIONATE 2 SPRAY: 50 SPRAY, METERED NASAL at 08:51

## 2023-07-21 RX ADMIN — FLUOXETINE 60 MG: 20 CAPSULE ORAL at 08:53

## 2023-07-21 RX ADMIN — LORAZEPAM 0.5 MG: 0.5 TABLET ORAL at 08:51

## 2023-07-21 RX ADMIN — PANTOPRAZOLE SODIUM 40 MG: 40 INJECTION, POWDER, FOR SOLUTION INTRAVENOUS at 08:51

## 2023-07-21 RX ADMIN — MELOXICAM 7.5 MG: 7.5 TABLET ORAL at 08:53

## 2023-07-21 ASSESSMENT — ACTIVITIES OF DAILY LIVING (ADL)
ADLS_ACUITY_SCORE: 32

## 2023-07-21 NOTE — PROGRESS NOTES
Colon and Rectal Surgery  Daily Progress Note    Subjective  Feels great. Tolerated LFD yesteday without nausea. Passing gas and stools. Denies abdominal pain or bloating. Ready to discharge home.     Objective  Intake/Output last 24 hrs:    Intake/Output Summary (Last 24 hours) at 7/20/2023 0815  Last data filed at 7/20/2023 0637  Gross per 24 hour   Intake 5062.83 ml   Output 1925 ml   Net 3137.83 ml     Temp:  [98.6  F (37  C)-99.4  F (37.4  C)] 98.9  F (37.2  C)  Pulse:  [] 101  Resp:  [16-18] 18  BP: (118-135)/(64-69) 126/69  SpO2:  [92 %-94 %] 94 %    Physical Exam:  General: awake, alert, sitting in chair, in no acute distress  Head: normocephalic, atraumatic  Respiratory: non-labored breathing  Abdomen: soft, non-tender, non-distended              Incisions: clean, dry and intact. Resolving ecchymosis   Skin: No rashes or lesions  Musculoskeletal: moves all four extremities equally  Psychological: alert and oriented, answers questions appropriately    Pertinent Labs  Lab Results: personally reviewed.  Lab Results   Component Value Date     07/20/2023     07/19/2023     07/18/2023    CO2 25 07/20/2023    CO2 24 07/19/2023    CO2 23 07/18/2023    CO2 24 02/15/2022    CO2 26 09/21/2020    CO2 24 01/09/2020    BUN 13.0 07/20/2023    BUN 13.8 07/19/2023    BUN 16.3 07/18/2023    BUN 12 02/15/2022    BUN 12 09/21/2020    BUN 10 01/09/2020     Lab Results   Component Value Date    WBC 12.6 07/13/2023    WBC 12.5 07/12/2023    WBC 14.1 07/11/2023    HGB 10.9 07/13/2023    HGB 11.5 07/12/2023    HGB 12.0 07/11/2023    HCT 35.5 07/13/2023    HCT 36.5 07/12/2023    HCT 38.1 07/11/2023    MCV 92 07/13/2023    MCV 91 07/12/2023    MCV 93 07/11/2023     07/20/2023     07/16/2023     07/13/2023       Assessment/Plan: This is a 58 year old male POD #18 s/p robotic extended left colectomy for colon cancer    - LFD  - Lovenox dvt ppx; will NOT need at discharge  - OOB/Ambulate  -  OK for discharge today  - Discharge instructions discussd    Will discuss with Dr. Valadez and Dr. Glenny Lloyd PA-C  [facility]  [phone]..............................main

## 2023-07-21 NOTE — PROGRESS NOTES
Physical Therapy Discharge Summary    Reason for therapy discharge:    Discharged to home with home therapy.    Progress towards therapy goal(s). See goals on Care Plan in [facility] electronic health record for goal details.  Goals met    Therapy recommendation(s):    Continued therapy is recommended.  Rationale/Recommendations:  HHPT for safety, IND, full return to PLOF.

## 2023-07-21 NOTE — PLAN OF CARE
Occupational Therapy Discharge Summary    Reason for therapy discharge:    Discharged to home with home therapy.    Progress towards therapy goal(s). See goals on Care Plan in [facility] electronic health record for goal details.  Goals partially met.  Barriers to achieving goals:   limited tolerance for therapy.    Therapy recommendation(s):    Continued therapy is recommended.  Rationale/Recommendations:  for endurance building and functional independence goals.

## 2023-07-26 NOTE — DISCHARGE SUMMARY
Discharge Summary    Patient name: Dayday Silva  YOB: 1965   Age: 58 year old  Medical Record Number: [id number]  Primary Care Physician:  Cheryl Parikh       Admission Date: 7/3/2023.  Discharge Date: 7/21/2023  Condition at Discharge: STable       Principal Diagnosis:  Colon cancer (H)    HISTORY OF PRESENT ILLNESS (per H&P, operative note, or clinic note): The patient is a 58 year old male with a history of cancer and a polyp that was resected in a piecemeal fashion with indeterminate margins and lymphovascular invasion.      PROCEDURES PERFORMED DURING HOSPITALIZATION:    1. Robotic extended left colectomy  2. Mobilization of the splenic flexure  3. Colonoscopy       FINAL PATHOLOGY:   COLON, LEFT EXTENDED ROBOTIC COLECTOMY:  -LEFT COLON WITH AREA OF TATTOO, FOCAL 1 MM FOCUS OF RESIDUAL TUBULAR ADENOMA AND PREVIOUS BIOPSY SITE CHANGE  -NEGATIVE FOR RESIDUAL ADENOCARCINOMA AND HIGH-GRADE DYSPLASIA  -MARGINS NEGATIVE FOR ADENOCARCINOMA, HIGH-GRADE DYSPLASIA AND ADENOMATOUS POLYP  -FORTY-THREE LYMPH NODES NEGATIVE FOR METASTATIC CARCINOMA (0/43)  -BACKGROUND DIVERTICULOSIS  -SEE COMMENT    BRIEF HOSPITAL COURSE: This 58 year old male presented for an elective robotic extended left colectomy, mobilization of splenic flexure, and colonoscopy and was transferred to the surgical saul following the procedure. Due to persistent nausea/emesis postoperatively NGT was placed. CT was performed on 7/10 which showed no concern for abscess or free air, but did note an abrupt transition point of the dilated small bowel suggestive of bowel obstruction.  Conservative measures with bowel rest and NGT were continued, and TPN was initiated for nutrition. The patient also developed a fever 2 days following and fever work-up was performed with CXR, UA, blood cultures, and repeat CT CAP which were otherwise normal with improving bowel dilation.     The patient eventually had return of bowel function and NGT was  removed. Pain medication was transitioned from IV to oral uneventfully. TPN was discontinued once tolerating oral diet. At the time of discharge, he was voiding freely, tolerating a regular diet, and pain was controlled with oral pain medications. The patient was discharged home on POD #18 in stable condition.     PHYSICAL EXAM ON DATE OF DISCHARGE: General: awake, alert, sitting in chair, in no acute distress  Head: normocephalic, atraumatic  Respiratory: non-labored breathing  Abdomen: soft, non-tender, non-distended              Incisions: clean, dry and intact. Resolving ecchymosis   Skin: No rashes or lesions  Musculoskeletal: moves all four extremities equally  Psychological: alert and oriented, answers questions appropriately    Vital Signs in last 24 hours:     Temp:  [98.6  F (37  C)-99.4  F (37.4  C)] 98.9  F (37.2  C)  Pulse:  [] 101  Resp:  [16-18] 18  BP: (118-135)/(64-69) 126/69  SpO2:  [92 %-94 %] 94 %    COMPLICATIONS IN HOSPITAL: Ileus versus bowel obstruction, stable hematoma  IMPORTANT PENDING TEST RESULTS: None    Discharge Medications/Orders:  None    FOLLOW UP: He should see Cheryl Parikh in 1 week.  Follow up with Dr. Valadez in 3-4 weeks.     Total time spent for discharge on date of discharge: 15 minutes, with over half spent in counseling and coordinating care  I saw the patient on the date of discharge    Cynthia Lloyd PA-C  [facility]  [phone]    ADDENDUM:  Length of stay: 18 days  Indicate Y or N for the following:  UTI NO  C diff NO  PNA NO  SSI NO  DVT NO  PE NO  CVA NO  MI NO  Enterocutaneous fistula NO  Peripheral nerve injury NO  Abscess (not adjacent to anastomosis) NO  Leak NO            Death within 30 days NO  Reintubation NO  Reoperation NO              FOR CANCER CASES:  No residual cancer in specimen  T stage (1,2,3,4,5 if unknown):0  N stage (0,1,2,3 if unknown): 0              Total number of nodes: 43              Total positive:  0  M stage (0,1): 0  R (0,1,2,3 if unknown):0  MSI (pos, neg): Negative

## 2023-07-27 ENCOUNTER — TELEPHONE (OUTPATIENT)
Dept: FAMILY MEDICINE | Facility: CLINIC | Age: 58
End: 2023-07-27
Payer: COMMERCIAL

## 2023-07-27 NOTE — TELEPHONE ENCOUNTER
Reason for call:  Other     Patient called regarding (reason for call): home care     Additional comments: Chantelle is calling to let you know that hey are going to start OT,PT,skilled nursing tomorrow 07/28/2023.for eval and treat. Please advise and call Chantelle if needed please and thank you.    Phone number to reach patient:  Other phone number:  [phone]

## 2023-07-28 ENCOUNTER — MEDICAL CORRESPONDENCE (OUTPATIENT)
Dept: HEALTH INFORMATION MANAGEMENT | Facility: CLINIC | Age: 58
End: 2023-07-28

## 2023-07-28 PROCEDURE — G0180 MD CERTIFICATION HHA PATIENT: HCPCS | Performed by: FAMILY MEDICINE

## 2023-08-01 ENCOUNTER — MEDICAL CORRESPONDENCE (OUTPATIENT)
Dept: HEALTH INFORMATION MANAGEMENT | Facility: CLINIC | Age: 58
End: 2023-08-01
Payer: COMMERCIAL

## 2023-08-08 ENCOUNTER — MEDICAL CORRESPONDENCE (OUTPATIENT)
Dept: HEALTH INFORMATION MANAGEMENT | Facility: CLINIC | Age: 58
End: 2023-08-08
Payer: COMMERCIAL

## 2023-08-08 DIAGNOSIS — Z53.9 DIAGNOSIS NOT YET DEFINED: Primary | ICD-10-CM

## 2023-08-10 ENCOUNTER — TELEPHONE (OUTPATIENT)
Dept: SLEEP MEDICINE | Facility: CLINIC | Age: 58
End: 2023-08-10
Payer: COMMERCIAL

## 2023-08-10 NOTE — TELEPHONE ENCOUNTER
CALLED PT TO SEE WHAT THE TROUBLE IS. PT STATED THE MACHINE WILL SHUT DOWN AND NOT WORK AT ALL. ASKED IF HE HAS IT PLUGGED INTO A WALL OUTLET OR POWER STIP. PT STATED HE IS USING AN EXTENSION CORD. TOLD HIM HE REALLY NEEDS TO PLUG INTO THE OUTLET. PT STATED THE OUTLET BEHIND HIS BED ISN'T WORKING. TOLD HIM HE SHOULD ASK THE STAFF TO FIX IT. TOLD PT IF HE CAN COME TO [facility] I CAN LOOK AT HIS MACHINE TODAY AND SEE IF THERE IS ANYTHING I CAN FIGURE OUT. SPOKE W/ STAFF AT [facility] HOME AND GAVE HER THE ADDRESS. PT DOES HAVE A HEATING SYS FAULT LISTED.

## 2023-08-10 NOTE — TELEPHONE ENCOUNTER
PT CAME W/ CARE GIVER AND MACHINE. PLUGGED MACHINE IN AND IT WAS WORKING. TOLD PT TO MAKE SURE STAFF FIX THE OUTLET AND HELP HIM GET MACHINE PLUGGED INTO THAT INSTEAD OF POWER STRIP. SAW THAT PT HAD AN O2 ADAPTOR ON HIS HOSE. ASKED IF HE USES O2 AT NIGHT. HE STATED NO. TOLD HIM THIS WILL ALSO CAUSE LEAK AND MAY CAUSE MACHINE TO SHUT DOWN. PT HAD A DIFFERENT ELBOW ADAPTOR FOR HIS F20 MASK THAT HE SAID THE HOSPITAL GAVE HIM. GAVE HIM THE CORRECT ADAPTOR SO HIS TUBING WOULD WORK. TOLD HIM WE JUST SHIPPED OUT TUBING, FILTERS AND A CUSHION AND TO LOOK FOR THOSE IN THE MAIL SOON. PT AND CARE GIVER EXPRESSED UNDERSTANDING. TOLD THEM HE WILL BE ELIGIBLE FOR NEW MACHINE IN DEC OF THIS YEAR.  TOLD PT HE WILL NEED DR VISIT AND NEW RX W/ MACHINE PRESSURES ON IT.

## 2023-08-16 PROBLEM — J30.2 SEASONAL ALLERGIES: Status: ACTIVE | Noted: 2021-06-02

## 2023-08-16 PROBLEM — R26.2 DIFFICULTY IN WALKING, NOT ELSEWHERE CLASSIFIED: Status: ACTIVE | Noted: 2023-01-27

## 2023-08-16 PROBLEM — R07.9 LEFT-SIDED CHEST PAIN: Status: ACTIVE | Noted: 2022-09-01

## 2023-08-16 PROBLEM — M25.562 KNEE PAIN, LEFT: Status: ACTIVE | Noted: 2022-12-21

## 2023-08-16 PROBLEM — M25.561 RIGHT KNEE PAIN: Status: ACTIVE | Noted: 2022-04-11

## 2023-08-16 PROBLEM — N39.46 MIXED STRESS AND URGE URINARY INCONTINENCE: Status: ACTIVE | Noted: 2023-04-19

## 2023-08-16 PROBLEM — N52.31 ERECTILE DYSFUNCTION FOLLOWING RADICAL PROSTATECTOMY: Status: ACTIVE | Noted: 2017-02-08

## 2023-08-16 PROBLEM — F32.9 MAJOR DEPRESSIVE DISORDER, SINGLE EPISODE, UNSPECIFIED: Status: ACTIVE | Noted: 2022-09-01

## 2023-08-16 PROBLEM — M19.90 OSTEOARTHROSIS: Status: ACTIVE | Noted: 2023-02-15

## 2023-08-16 PROBLEM — Q04.9 CONGENITAL MALFORMATION OF BRAIN (H): Status: ACTIVE | Noted: 2023-04-19

## 2023-08-16 PROBLEM — Z96.651 PRESENCE OF RIGHT ARTIFICIAL KNEE JOINT: Status: ACTIVE | Noted: 2023-01-27

## 2023-08-16 PROBLEM — Z96.651 S/P TOTAL KNEE ARTHROPLASTY, RIGHT: Status: ACTIVE | Noted: 2023-01-25

## 2023-08-16 PROBLEM — H61.23 IMPACTED CERUMEN, BILATERAL: Status: ACTIVE | Noted: 2021-08-04

## 2023-08-16 PROBLEM — R53.1 WEAKNESS: Status: ACTIVE | Noted: 2023-01-27

## 2023-08-16 PROBLEM — F33.42 MAJOR DEPRESSIVE DISORDER, RECURRENT, IN FULL REMISSION (H): Status: ACTIVE | Noted: 2023-01-27

## 2023-08-16 PROBLEM — E55.9 VITAMIN D DEFICIENCY, UNSPECIFIED: Status: ACTIVE | Noted: 2022-04-25

## 2023-08-16 PROBLEM — F43.10 POST-TRAUMATIC STRESS DISORDER, UNSPECIFIED: Status: ACTIVE | Noted: 2023-01-27

## 2023-08-16 PROBLEM — M77.9 ENTHESOPATHY, UNSPECIFIED: Status: ACTIVE | Noted: 2020-09-22

## 2023-08-16 PROBLEM — Z20.822 CONTACT WITH AND (SUSPECTED) EXPOSURE TO COVID-19: Status: ACTIVE | Noted: 2022-01-24

## 2023-08-16 PROBLEM — R09.02 HYPOXEMIA: Status: ACTIVE | Noted: 2023-01-27

## 2023-08-16 PROBLEM — M62.81 MUSCLE WEAKNESS (GENERALIZED): Status: ACTIVE | Noted: 2023-01-27

## 2023-08-16 PROBLEM — F41.1 GENERALIZED ANXIETY DISORDER: Status: ACTIVE | Noted: 2023-02-20

## 2023-08-16 PROBLEM — F99 MENTAL HEALTH DISORDER: Status: ACTIVE | Noted: 2020-09-22

## 2023-08-16 PROBLEM — R31.9 HEMATURIA: Status: ACTIVE | Noted: 2021-01-25

## 2023-08-16 PROBLEM — F41.9 ANXIETY: Status: ACTIVE | Noted: 2023-02-20

## 2023-08-16 PROBLEM — M17.11 LOCALIZED OSTEOARTHRITIS OF RIGHT KNEE: Status: ACTIVE | Noted: 2022-11-23

## 2023-08-16 PROBLEM — F70 MILD INTELLECTUAL DISABILITIES: Status: ACTIVE | Noted: 2023-01-27

## 2023-08-22 ENCOUNTER — MEDICAL CORRESPONDENCE (OUTPATIENT)
Dept: HEALTH INFORMATION MANAGEMENT | Facility: CLINIC | Age: 58
End: 2023-08-22
Payer: COMMERCIAL

## 2023-08-23 ENCOUNTER — MEDICAL CORRESPONDENCE (OUTPATIENT)
Dept: HEALTH INFORMATION MANAGEMENT | Facility: CLINIC | Age: 58
End: 2023-08-23
Payer: COMMERCIAL

## 2023-08-24 ENCOUNTER — OFFICE VISIT (OUTPATIENT)
Dept: FAMILY MEDICINE | Facility: CLINIC | Age: 58
End: 2023-08-24
Payer: COMMERCIAL

## 2023-08-24 VITALS
WEIGHT: 261.4 LBS | TEMPERATURE: 99.4 F | RESPIRATION RATE: 20 BRPM | BODY MASS INDEX: 43.55 KG/M2 | DIASTOLIC BLOOD PRESSURE: 72 MMHG | HEIGHT: 65 IN | OXYGEN SATURATION: 95 % | HEART RATE: 105 BPM | SYSTOLIC BLOOD PRESSURE: 124 MMHG

## 2023-08-24 DIAGNOSIS — F25.0 SCHIZOAFFECTIVE DISORDER, BIPOLAR TYPE (H): Chronic | ICD-10-CM

## 2023-08-24 DIAGNOSIS — C18.4 MALIGNANT NEOPLASM OF TRANSVERSE COLON (H): Primary | ICD-10-CM

## 2023-08-24 LAB
ERYTHROCYTE [DISTWIDTH] IN BLOOD BY AUTOMATED COUNT: 15 % (ref 10–15)
HCT VFR BLD AUTO: 38.1 % (ref 40–53)
HGB BLD-MCNC: 11.6 G/DL (ref 13.3–17.7)
MCH RBC QN AUTO: 27.2 PG (ref 26.5–33)
MCHC RBC AUTO-ENTMCNC: 30.4 G/DL (ref 31.5–36.5)
MCV RBC AUTO: 89 FL (ref 78–100)
PLATELET # BLD AUTO: 245 10E3/UL (ref 150–450)
RBC # BLD AUTO: 4.27 10E6/UL (ref 4.4–5.9)
WBC # BLD AUTO: 9.2 10E3/UL (ref 4–11)

## 2023-08-24 PROCEDURE — 99024 POSTOP FOLLOW-UP VISIT: CPT | Performed by: FAMILY MEDICINE

## 2023-08-24 PROCEDURE — 36415 COLL VENOUS BLD VENIPUNCTURE: CPT | Performed by: FAMILY MEDICINE

## 2023-08-24 PROCEDURE — 85027 COMPLETE CBC AUTOMATED: CPT | Performed by: FAMILY MEDICINE

## 2023-08-24 ASSESSMENT — PATIENT HEALTH QUESTIONNAIRE - PHQ9
10. IF YOU CHECKED OFF ANY PROBLEMS, HOW DIFFICULT HAVE THESE PROBLEMS MADE IT FOR YOU TO DO YOUR WORK, TAKE CARE OF THINGS AT HOME, OR GET ALONG WITH OTHER PEOPLE: NOT DIFFICULT AT ALL
SUM OF ALL RESPONSES TO PHQ QUESTIONS 1-9: 1
SUM OF ALL RESPONSES TO PHQ QUESTIONS 1-9: 1

## 2023-08-24 ASSESSMENT — PAIN SCALES - GENERAL: PAINLEVEL: NO PAIN (0)

## 2023-08-24 NOTE — PROGRESS NOTES
Hospital Follow-up Visit:    Hospital/Nursing Home/IP Rehab Facility: [facility]  Date of Admission: 7/3/2023  Date of Discharge: 7/21/2023  Reason(s) for Admission: Colon Cancer    Was your hospitalization related to COVID-19? No   Problems taking medications regularly:  None  Medication changes since discharge: None  Problems adhering to non-medication therapy:  None    Summary of hospitalization:  [facility] discharge summary reviewed  Diagnostic Tests/Treatments reviewed.  Follow up needed: none  Other Healthcare Providers Involved in Patient s Care:         None  Update since discharge: improved.     Assessment plan: Patient is being seen 1 month postop hemicolectomy for adenocarcinoma transverse colon.;  Patient had excellent surgical result but stormy postoperative course but recovered well and is being seen here in primary care for follow-up accompanied by his group home transportation and his friend.  He states that his incision is healing well he was seen by surgery.  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  He is still having accidents.  And wearing diapers.  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  This is for a public health concern.  Other than that he is very cheerful having normal discomfort and really doing quite well.  He will follow with Melissa Parikh for his usual examination in 3 to 6 months.  He is to continue his psychiatric medications unchanged.  A form was filled out for his group home  General Appearance:  Alert, cooperative, no distress  Head:  Normocephalic, no obvious abnormality  Ears: TM anatomy normal  Eyes:  PERRL, EOM's intact, conjunctiva and corneas clear  Nose:  Nares symmetrical, septum midline, mucosa pink, no sinus tenderness  Throat:  Lips, tongue,  and mucosa are moist, pink, and intact  Neck:  Supple, symmetrical, trachea midline, no adenopathy; thyroid: no enlargement, symmetric,no tenderness/mass/nodules; no carotid bruit, no JVD  Back:  Symmetrical, no curvature, ROM normal, no CVA tenderness  Chest/Breast:  No mass or tenderness  Lungs:  Clear to auscultation bilaterally, respirations unlabored   Heart:  Normal PMI, regular rate & rhythm, S1 and S2 normal, no murmurs, rubs, or gallops  Abdomen:  Soft, non-tender, bowel sounds active all four quadrants, no mass, or organomegaly; surgical site well-healed  Musculoskeletal:  Tone and strength strong and symmetrical, all extremities  Lymphatic:  No adenopathy  Skin/Hair/Nails:  Skin warm, dry, and intact, no rashes  Neurologic:  Alert and oriented x3, no cranial nerve deficits, normal strength and tone, gait steady  Extremities:  No edema.  Margaret's sign negative.    Genitourinary: deferred  Pulses:  Equal bilaterally    Plan of care communicated with patient           Answers submitted by the patient for this visit:  Patient Health Questionnaire (Submitted on 8/24/2023)  If you checked off any problems, how difficult have these problems made it for you to do your work, take care of things at home, or get along with other people?: Not difficult at all  PHQ9 TOTAL SCORE: 1

## 2024-03-19 ENCOUNTER — TELEPHONE (OUTPATIENT)
Dept: SLEEP MEDICINE | Facility: CLINIC | Age: 59
End: 2024-03-19

## 2024-03-19 ENCOUNTER — OFFICE VISIT (OUTPATIENT)
Dept: SLEEP MEDICINE | Facility: CLINIC | Age: 59
End: 2024-03-19
Payer: COMMERCIAL

## 2024-03-19 VITALS
HEIGHT: 65 IN | HEART RATE: 94 BPM | SYSTOLIC BLOOD PRESSURE: 115 MMHG | DIASTOLIC BLOOD PRESSURE: 76 MMHG | BODY MASS INDEX: 43.99 KG/M2 | OXYGEN SATURATION: 95 % | WEIGHT: 264 LBS

## 2024-03-19 DIAGNOSIS — F51.02 INSOMNIA DUE TO PSYCHOLOGICAL STRESS: Primary | ICD-10-CM

## 2024-03-19 DIAGNOSIS — G47.33 OSA (OBSTRUCTIVE SLEEP APNEA): ICD-10-CM

## 2024-03-19 PROCEDURE — 99214 OFFICE O/P EST MOD 30 MIN: CPT | Performed by: INTERNAL MEDICINE

## 2024-03-19 ASSESSMENT — SLEEP AND FATIGUE QUESTIONNAIRES
HOW LIKELY ARE YOU TO NOD OFF OR FALL ASLEEP WHILE WATCHING TV: HIGH CHANCE OF DOZING
HOW LIKELY ARE YOU TO NOD OFF OR FALL ASLEEP WHILE LYING DOWN TO REST IN THE AFTERNOON WHEN CIRCUMSTANCES PERMIT: MODERATE CHANCE OF DOZING
HOW LIKELY ARE YOU TO NOD OFF OR FALL ASLEEP WHILE SITTING AND READING: MODERATE CHANCE OF DOZING
HOW LIKELY ARE YOU TO NOD OFF OR FALL ASLEEP WHEN YOU ARE A PASSENGER IN A CAR FOR AN HOUR WITHOUT A BREAK: HIGH CHANCE OF DOZING
HOW LIKELY ARE YOU TO NOD OFF OR FALL ASLEEP WHILE SITTING INACTIVE IN A PUBLIC PLACE: MODERATE CHANCE OF DOZING
HOW LIKELY ARE YOU TO NOD OFF OR FALL ASLEEP WHILE SITTING QUIETLY AFTER LUNCH WITHOUT ALCOHOL: SLIGHT CHANCE OF DOZING
HOW LIKELY ARE YOU TO NOD OFF OR FALL ASLEEP IN A CAR, WHILE STOPPED FOR A FEW MINUTES IN TRAFFIC: HIGH CHANCE OF DOZING
HOW LIKELY ARE YOU TO NOD OFF OR FALL ASLEEP WHILE SITTING AND TALKING TO SOMEONE: SLIGHT CHANCE OF DOZING

## 2024-03-19 NOTE — NURSING NOTE
"Chief Complaint   Patient presents with    CPAP Follow Up       Initial /76   Pulse 94   Ht 1.651 m (5' 5\")   Wt 119.7 kg (264 lb)   SpO2 95%   BMI 43.93 kg/m   Estimated body mass index is 43.93 kg/m  as calculated from the following:    Height as of this encounter: 1.651 m (5' 5\").    Weight as of this encounter: 119.7 kg (264 lb).    Medication Reconciliation: complete    Neck circumference:  inches / centimeters.    DME: [facility]    Pressure setting changed pre order.  6 month follow-up appt scheduled.      Araseli Goodwin MA  "

## 2024-03-19 NOTE — PROGRESS NOTES
Additional 15 minutes on the date of service was spent performing the following:    -Preparing to see the patient  -Obtaining and/or reviewing separately obtained history   -Ordering medications, tests, or procedures   -Documenting clinical information in the electronic or other health record     Thank you for the opportunity to participate in the care of Dayady Silva.     He is a 58 year old y/o male patient who comes to the sleep medicine clinic for follow up. The patient was diagnosed with obstructive sleep apnea at our facility on 09/17/2019 (AHI = 16.4).  The patient states that the recent death of his longtime caretaker Vivi at the assisted living facility had really had a psychological effect on him making it difficult for him to fall asleep and stay asleep.  Furthermore there seems to be a lot of noises as he is trying to go to sleep from the other inhabitants of the assisted living facility that wakes him up at night.  He also complains of the current pressure setting seems to be too high and the mask is uncomfortable.     Assessment and Plan:  In summary Dayday Silva is a 58 year old year old male who is here for follow up.    1. 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  I also recommend that he apply earplugs to decrease sound as he is trying to fall asleep.  Return to clinic in 6 months.    2. VICENTE (obstructive sleep apnea)  I will also write a prescription for the patient to try different type of mask.  Will also decrease his pressure to CPAP pressure of 10 CWP.  - COMPREHENSIVE DME    Compliance Download data for 30 days:  Compliance: 50%  Pressure setting: APAP 7-15 CWP  95% pressure: 14.2 CWP  Leak: Minimal  Residual AHI: 5.6 events per hour  Mask Tolerance: Good  Skin irritation: None  DME: [facility]    Lab reviewed: Discussed with patient.    Cpap Fu Template    Question 3/19/2024  9:28 AM CDT - Filed by Patient   Do you  use a CPAP Machine at home? Yes   Overall, on a scale of 0-10 how would you rate your CPAP? 10   Is your mask comfortable? No   If not, why? its to tight   Is your mask leaking? No   Do you notice snoring with mask on? Yes   Do you notice gasping arousals with mask on? Yes   Are you having significant oral or nasal dryness? Yes   Is the pressure setting comfortable? No   If not, why? its to tight   What type of mask do you use? Full Face Mask   What is your typical bedtime? 7:30-8   How long does it take you to go to sleep on PAP therapy? about a hour   What time do you typically get out of bed for the day? about 7   How many hours on average per night are you using PAP therapy? 3   How many hours are you sleeping per night? 7   Do you feel well rested in the morning? No       RICKY:  RICKY Total Score: 19  Total score - Monitor: 17 (3/19/2024  9:29 AM)    Failed to redirect to the Timeline version of the [employer] SmartLink.   Patient Active Problem List   Diagnosis    Chronic Major Depression    Obstructive sleep apnea syndrome    Lower Back Pain Chronic    Allergic Rhinitis    Varicose Veins    Schizoaffective disorder (H)    Myopia, unspecified laterality    Left anterior shoulder pain    Hypophosphatemia    Trouble in sleeping    Urinary incontinence    History of prostate cancer    Mild cognitive disorder    Morbid obesity (H)    Ingrown nail    Colorectal polyps    Colon cancer (H)    Prostate disease    Alcohol abuse, in remission    Anxiety    Benign prostatic hyperplasia with urinary obstruction    Congenital malformation of brain (H)    Contact with and (suspected) exposure to covid-19    Difficulty in walking, not elsewhere classified    Enthesopathy, unspecified    Erectile dysfunction following radical prostatectomy    Hematuria    Hypoxemia    Impacted cerumen, bilateral    Left-sided chest pain    Localized osteoarthritis of right knee    Major depressive disorder, recurrent, in full remission (H24)    Major  depressive disorder, single episode, unspecified    Malignant tumor of prostate (H)    Mental health disorder    Mild intellectual disabilities    Mixed stress and urge urinary incontinence    Muscle weakness (generalized)    Osteoarthrosis    Overactive bladder    Knee pain, left    Right knee pain    Post-traumatic stress disorder, unspecified    Generalized anxiety disorder    Presence of right artificial knee joint    S/P total knee arthroplasty, right    Seasonal allergies    Vitamin D deficiency, unspecified    Weakness       Past Medical History:   Diagnosis Date    Allergic rhinitis, cause unspecified     Cancer (H)     Prostate    Class 2 obesity due to excess calories with serious comorbidity and body mass index (BMI) of 36.0 to 36.9 in adult     Created by Conversion     Colon cancer (H)     Complication of anesthesia     Localized osteoarthritis of right knee 11/23/2022    Lumbago     Major depressive disorder, recurrent episode, unspecified     Obesity, unspecified     Other chronic pain     Schizoaffective disorder (H) 06/24/2015    Sleep apnea     Urinary incontinence        Past Surgical History:   Procedure Laterality Date    COLECTOMY, LOW ANTERIOR, ROBOT-ASSISTED, LAPAROSCOPIC, USING DA ETHAN XI Left 7/3/2023    Procedure: ROBOTIC LEFT COLECTOMY, DIAGNOSTIC LAPAROSCOPY, MOBILIZATION OF SPLENIC FLEXURE;  Surgeon: John Paul Valadez MD;  Location: [facility] OR    COLONOSCOPY N/A 7/3/2023    Procedure: COLONOSCOPY;  Surgeon: John Paul Valadez MD;  Location: [facility] OR    HEMORRHOID SURGERY      ORTHOPEDIC SURGERY      PICC DOUBLE LUMEN PLACEMENT  7/10/2023         [place] LAP,PROSTATECTOMY,RADICAL,W/NERVE SPARE,INCL ROBOTIC Bilateral 12/07/2016    Procedure: DA ETHAN RADICAL RETROPUBIC PROSTATECTOMY, BILATERAL PELVIC LYMPH NODE DISSECTION ;  Surgeon: Ulisses French MD;  Location: [facility];  Service: Urology    PROSTATE BIOPSY      PROSTATECTOMY  12/07/2016       Current Outpatient  Medications   Medication Sig Dispense Refill    acetaminophen (TYLENOL) 500 MG tablet Take 1,000 mg by mouth every 6 hours as needed for mild pain      ARIPiprazole (ABILIFY) 30 MG tablet Take 30 mg by mouth At Bedtime      ASPIRIN LOW DOSE 81 MG EC tablet Take 81 mg by mouth daily      calcium carbonate 500 mg, elemental, 1250 (500 Ca) MG tablet chewable Take 1 tablet by mouth daily as needed for heartburn      doxazosin (CARDURA) 2 MG tablet Take 2 mg by mouth every evening      doxepin (SILENOR) 3 MG tablet Take 3 mg by mouth At Bedtime      famotidine (PEPCID) 20 MG tablet Take 20 mg by mouth 2 times daily as needed (heartburn)      FLUoxetine (PROZAC) 20 MG capsule Take 20 mg by mouth daily Along with 40mg capsule for total 60mg daily      FLUoxetine (PROZAC) 40 MG capsule Take 40 mg by mouth daily Along with 20mg capsule for total 60mg daily      fluticasone (FLONASE) 50 MCG/ACT nasal spray Spray 2 sprays into both nostrils daily      gabapentin (NEURONTIN) 300 MG capsule Take 300 mg by mouth 3 times daily      hydrOXYzine (ATARAX) 25 MG tablet Take 50 mg by mouth At Bedtime      hydrOXYzine (ATARAX) 50 MG tablet       ketotifen (ZADITOR/ZYRTEC ITCHY EYES) 0.025 % (0.035 %) ophthalmic solution [KETOTIFEN (ZADITOR/ZYRTEC ITCHY EYES) 0.025 % (0.035 %) OPHTHALMIC SOLUTION] INSTILL ONE DROP IN BOTH EYES UP TO TWICE DAILY AS NEEDED **12 TOTAL FILLS* (Patient taking differently: Place 1 drop into both eyes 2 times daily as needed for dry eyes or itching) 5 mL 10    loratadine (CLARITIN) 10 mg tablet [LORATADINE (CLARITIN) 10 MG TABLET] Take 1 tablet (10 mg total) by mouth daily. 100 tablet 3    LORazepam (ATIVAN) 0.5 MG tablet Take 0.5 mg by mouth 2 times daily      melatonin 5 MG tablet Take 10 mg by mouth At Bedtime      meloxicam (MOBIC) 7.5 MG tablet Take 7.5 mg by mouth 2 times daily (with meals)      mirabegron (MYRBETRIQ) 50 mg Tb24 ER tablet [MIRABEGRON (MYRBETRIQ) 50 MG TB24 ER TABLET] Take 50 mg by mouth  "daily.      multivitamin with folic acid (ONE DAILY MULTIVITAMIN) 400 mcg Tab [MULTIVITAMIN WITH FOLIC ACID (ONE DAILY MULTIVITAMIN) 400 MCG TAB] Take 1 tablet by mouth daily. 90 tablet 3    polyethylene glycol (MIRALAX) 17 gram packet [POLYETHYLENE GLYCOL (MIRALAX) 17 GRAM PACKET] DISSOLVE 1 PACKET INTO LIQUID AND DRINK BY MOUTH EVERY OTHER DAY;INCREASE TO ONCE DAILY DOSE IF CONSTIPATED. (Patient taking differently: Take 1 packet by mouth every other day) 14 each 11    senna-docusate (SENOKOT-S/PERICOLACE) 8.6-50 MG tablet Take 1 tablet by mouth 2 times daily as needed for constipation      TOVIAZ 4 mg Tb24 ER tablet Take 4 mg by mouth daily      trolamine salicylate-aloe vera (ASPERCREME WITH ALOE) 10 % Crea [TROLAMINE SALICYLATE-ALOE VERA (ASPERCREME WITH ALOE) 10 % CREA] Apply to left shoulder area 3x daily as needed. (Patient taking differently: Apply topically 3 times daily as needed for other (Left shoulder pain)) 1 Tube 2       No Known Allergies      Physical Exam:  /76   Pulse 94   Ht 1.651 m (5' 5\")   Wt 119.7 kg (264 lb)   SpO2 95%   BMI 43.93 kg/m    BMI:Body mass index is 43.93 kg/m .   GEN: NAD,   Head: Normocephalic.  EYES: EOMI  Psych: normal mood, normal affect    Labs/Studies:      Lab Results   Component Value Date    PH 7.38 07/20/2023    PH 7.38 07/18/2023     No results found for: \"TSH\"  Lab Results   Component Value Date     (H) 07/20/2023     (H) 07/19/2023     Lab Results   Component Value Date    HGB 11.6 (L) 08/24/2023    HGB 10.9 (L) 07/13/2023     Lab Results   Component Value Date    BUN 13.0 07/20/2023    BUN 13.8 07/19/2023    CR 0.78 07/20/2023    CR 0.80 07/19/2023     Lab Results   Component Value Date    AST 83 (H) 07/19/2023     (H) 07/18/2023     (H) 07/19/2023     (H) 07/18/2023    ALKPHOS 170 (H) 07/17/2023    ALKPHOS 99 07/11/2023    BILITOTAL 1.1 07/17/2023    BILITOTAL 1.0 07/11/2023     No results found for: \"UAMP\", \"UBARB\", " "\"BENZODIAZEUR\", \"UCANN\", \"UCOC\", \"OPIT\", \"UPCP\"    Recent Labs   Lab Test 07/20/23  0619 07/19/23  1719 07/19/23  0755 07/19/23  0515   *  --   --  134*   POTASSIUM 4.4  --   --  4.3   CHLORIDE 100  --   --  100   CO2 25  --   --  24   ANIONGAP 10  --   --  10   * 107*   < > 117*   BUN 13.0  --   --  13.8   CR 0.78  --   --  0.80   REX 8.9  --   --  8.4*    < > = values in this interval not displayed.     I reviewed the efficacy and compliance report from his device. Data summarized on the HPI and the PAP compliance flow sheet.     Patient verbalized understanding of these issues, agrees with the plan and all questions were answered today. Patient was given an opportuntity to voice any other symptoms or concerns not listed above. Patient did not have any other symptoms or concerns.      Jose Haywood DO  Board Certified in Internal Medicine and Sleep Medicine    (Note created with Dragon voice recognition and unintended spelling errors and word substitutions may occur)     Audio and visual devices were used for this virtual clinic visit with permission from patient.    "

## 2024-03-19 NOTE — PATIENT INSTRUCTIONS
Consider starting Melatonin 3 mg orally before bedtime.  Please also wear ear plugs at bedtime.  Switch to a different CPAP machine.

## 2024-03-19 NOTE — TELEPHONE ENCOUNTER
Spoke with patients mother/POA and she states it is ok for patient to make medical decisions without her being present for all visits moving forward.    Araseli Goodwin MA

## 2024-05-01 ENCOUNTER — TELEPHONE (OUTPATIENT)
Dept: SLEEP MEDICINE | Facility: CLINIC | Age: 59
End: 2024-05-01
Payer: COMMERCIAL

## 2024-05-01 NOTE — TELEPHONE ENCOUNTER
Voicemail was left regarding sleep appointment location change.  Call back number for central scheduling left. [phone].

## 2024-06-30 ENCOUNTER — HEALTH MAINTENANCE LETTER (OUTPATIENT)
Age: 59
End: 2024-06-30

## 2024-10-07 ENCOUNTER — OFFICE VISIT (OUTPATIENT)
Dept: SLEEP MEDICINE | Facility: CLINIC | Age: 59
End: 2024-10-07
Payer: MEDICAID

## 2024-10-07 VITALS
HEIGHT: 65 IN | HEART RATE: 85 BPM | DIASTOLIC BLOOD PRESSURE: 84 MMHG | WEIGHT: 257 LBS | BODY MASS INDEX: 42.82 KG/M2 | OXYGEN SATURATION: 95 % | SYSTOLIC BLOOD PRESSURE: 135 MMHG

## 2024-10-07 DIAGNOSIS — G47.33 OSA (OBSTRUCTIVE SLEEP APNEA): Primary | ICD-10-CM

## 2024-10-07 PROCEDURE — 99213 OFFICE O/P EST LOW 20 MIN: CPT | Performed by: INTERNAL MEDICINE

## 2024-10-07 PROCEDURE — G2211 COMPLEX E/M VISIT ADD ON: HCPCS | Performed by: INTERNAL MEDICINE

## 2024-10-07 NOTE — NURSING NOTE
"Chief Complaint   Patient presents with    CPAP Follow Up     Patient states the machine keeps turning off and on.        Initial /84   Pulse 85   Ht 1.651 m (5' 5\")   Wt 116.6 kg (257 lb)   SpO2 95%   BMI 42.77 kg/m   Estimated body mass index is 42.77 kg/m  as calculated from the following:    Height as of this encounter: 1.651 m (5' 5\").    Weight as of this encounter: 116.6 kg (257 lb).    Medication Reconciliation: complete    Neck circumference: 17.7 inches / 45 centimeters.    DME: Marge Garcia CMA on 10/7/2024 at 4:18 PM     "

## 2024-10-07 NOTE — PROGRESS NOTES
Additional 10 minutes on the date of service was spent performing the following:    -Preparing to see the patient  -Ordering medications, tests, or procedures   -Documenting clinical information in the electronic or other health record     Thank you for the opportunity to participate in the care of Dayday Silva.     He is a 59 year old y/o male patient who comes to the sleep medicine clinic for follow up.  The patient was diagnosed with obstructive sleep apnea at our facility on 09/17/2019 (AHI = 16.4).  Since the patient's last clinical visit with me, he states that he is tolerating his new pressure settings much better.  However he also noticed that his CPAP machine which got off in the middle of the night on its own.     Assessment and Plan:  In summary Dayday Silva is a 59 year old year old male who is here for follow up.    1. VICENTE (obstructive sleep apnea)  I strongly recommend the patient contact the durable medical equipment company to get his current machine interrogated.  If necessary, the patient might need to get a replacement CPAP machine.  - COMPREHENSIVE DME     Compliance Download data for 90 Days:  Compliance:36%  Pressure setting:CPAP 10 cwp  Leak:Minimal  Residual AHI:8 events per hour  Mask Tolerance:Poor  Skin irritation:Yes  DME:[employer] [place]    Sleep-Wake Cycle:    The patient likes to initiate sleep at around 6-8 PM with a sleep latency of 15 minutes. The patient has 3 nocturnal awakenings. Final wake up time is around 6 AM.    Total score - [place]: 17 (3/19/2024  9:29 AM)    Failed to redirect to the Timeline version of the [employer] SmartLink.   Patient Active Problem List   Diagnosis    Chronic Major Depression    Obstructive sleep apnea syndrome    Lower Back Pain Chronic    Allergic Rhinitis    Varicose Veins    Schizoaffective disorder (H)    Myopia, unspecified laterality    Left anterior shoulder pain    Hypophosphatemia    Trouble in sleeping    Urinary incontinence    History  of prostate cancer    Mild cognitive disorder    Morbid obesity (H)    Ingrown nail    Colorectal polyps    Colon cancer (H)    Prostate disease    Alcohol abuse, in remission    Anxiety    Benign prostatic hyperplasia with urinary obstruction    Congenital malformation of brain (H)    Contact with and (suspected) exposure to covid-19    Difficulty in walking, not elsewhere classified    Enthesopathy, unspecified    Erectile dysfunction following radical prostatectomy    Hematuria    Hypoxemia    Impacted cerumen, bilateral    Left-sided chest pain    Localized osteoarthritis of right knee    Major depressive disorder, recurrent, in full remission (H)    Major depressive disorder, single episode, unspecified    Malignant tumor of prostate (H)    Mental health disorder    Mild intellectual disabilities    Mixed stress and urge urinary incontinence    Muscle weakness (generalized)    Osteoarthrosis    Overactive bladder    Knee pain, left    Right knee pain    Post-traumatic stress disorder, unspecified    Generalized anxiety disorder    Presence of right artificial knee joint    S/P total knee arthroplasty, right    Seasonal allergies    Vitamin D deficiency, unspecified    Weakness       Past Medical History:   Diagnosis Date    Allergic rhinitis, cause unspecified     Cancer (H)     Prostate    Class 2 obesity due to excess calories with serious comorbidity and body mass index (BMI) of 36.0 to 36.9 in adult     Created by Conversion     Colon cancer (H)     Complication of anesthesia     Localized osteoarthritis of right knee 11/23/2022    Lumbago     Major depressive disorder, recurrent episode, unspecified     Obesity, unspecified     Other chronic pain     Schizoaffective disorder (H) 06/24/2015    Sleep apnea     Urinary incontinence        Past Surgical History:   Procedure Laterality Date    COLECTOMY, LOW ANTERIOR, ROBOT-ASSISTED, LAPAROSCOPIC, USING DA ETHAN XI Left 7/3/2023    Procedure: ROBOTIC LEFT  COLECTOMY, DIAGNOSTIC LAPAROSCOPY, MOBILIZATION OF SPLENIC FLEXURE;  Surgeon: Jonh Paul Valadez MD;  [place] [facility] [place]    COLONOSCOPY N/A 7/3/2023    Procedure: COLONOSCOPY;  Surgeon: John Paul Valadez MD;  [place] [facility] [place]    HEMORRHOID SURGERY      ORTHOPEDIC SURGERY      PICC DOUBLE LUMEN PLACEMENT  7/10/2023         CA LAP,PROSTATECTOMY,RADICAL,W/NERVE SPARE,INCL ROBOTIC Bilateral 12/07/2016    Procedure: DA ETHAN RADICAL RETROPUBIC PROSTATECTOMY, BILATERAL PELVIC LYMPH NODE DISSECTION ;  Surgeon: Ulisses French MD;  Location: [facility];  Service: Urology    PROSTATE BIOPSY      PROSTATECTOMY  12/07/2016       Current Outpatient Medications   Medication Sig Dispense Refill    acetaminophen (TYLENOL) 500 MG tablet Take 1,000 mg by mouth every 6 hours as needed for mild pain      ARIPiprazole (ABILIFY) 30 MG tablet Take 30 mg by mouth At Bedtime      ASPIRIN LOW DOSE 81 MG EC tablet Take 81 mg by mouth daily      calcium carbonate 500 mg, elemental, 1250 (500 Ca) MG tablet chewable Take 1 tablet by mouth daily as needed for heartburn      doxazosin (CARDURA) 2 MG tablet Take 2 mg by mouth every evening      doxepin (SILENOR) 3 MG tablet Take 3 mg by mouth At Bedtime      famotidine (PEPCID) 20 MG tablet Take 20 mg by mouth 2 times daily as needed (heartburn)      FLUoxetine (PROZAC) 20 MG capsule Take 20 mg by mouth daily Along with 40mg capsule for total 60mg daily      FLUoxetine (PROZAC) 40 MG capsule Take 40 mg by mouth daily Along with 20mg capsule for total 60mg daily      fluticasone (FLONASE) 50 MCG/ACT nasal spray Spray 2 sprays into both nostrils daily      gabapentin (NEURONTIN) 300 MG capsule Take 300 mg by mouth 3 times daily      hydrOXYzine (ATARAX) 25 MG tablet Take 50 mg by mouth At Bedtime      hydrOXYzine (ATARAX) 50 MG tablet       ketotifen (ZADITOR/ZYRTEC ITCHY EYES) 0.025 % (0.035 %) ophthalmic solution [KETOTIFEN (ZADITOR/ZYRTEC ITCHY EYES) 0.025 % (0.035 %)  "OPHTHALMIC SOLUTION] INSTILL ONE DROP IN BOTH EYES UP TO TWICE DAILY AS NEEDED **12 TOTAL FILLS* (Patient taking differently: Place 1 drop into both eyes 2 times daily as needed for dry eyes or itching.) 5 mL 10    loratadine (CLARITIN) 10 mg tablet [LORATADINE (CLARITIN) 10 MG TABLET] Take 1 tablet (10 mg total) by mouth daily. 100 tablet 3    LORazepam (ATIVAN) 0.5 MG tablet Take 0.5 mg by mouth 2 times daily      melatonin 5 MG tablet Take 10 mg by mouth At Bedtime      meloxicam (MOBIC) 7.5 MG tablet Take 7.5 mg by mouth 2 times daily (with meals)      mirabegron (MYRBETRIQ) 50 mg Tb24 ER tablet [MIRABEGRON (MYRBETRIQ) 50 MG TB24 ER TABLET] Take 50 mg by mouth daily.      multivitamin with folic acid (ONE DAILY MULTIVITAMIN) 400 mcg Tab [MULTIVITAMIN WITH FOLIC ACID (ONE DAILY MULTIVITAMIN) 400 MCG TAB] Take 1 tablet by mouth daily. 90 tablet 3    polyethylene glycol (MIRALAX) 17 gram packet [POLYETHYLENE GLYCOL (MIRALAX) 17 GRAM PACKET] DISSOLVE 1 PACKET INTO LIQUID AND DRINK BY MOUTH EVERY OTHER DAY;INCREASE TO ONCE DAILY DOSE IF CONSTIPATED. (Patient taking differently: Take 1 packet by mouth every other day.) 14 each 11    senna-docusate (SENOKOT-S/PERICOLACE) 8.6-50 MG tablet Take 1 tablet by mouth 2 times daily as needed for constipation      TOVIAZ 4 mg Tb24 ER tablet Take 4 mg by mouth daily      trolamine salicylate-aloe vera (ASPERCREME WITH ALOE) 10 % Crea [TROLAMINE SALICYLATE-ALOE VERA (ASPERCREME WITH ALOE) 10 % CREA] Apply to left shoulder area 3x daily as needed. (Patient taking differently: Apply topically 3 times daily as needed for other (Left shoulder pain).) 1 Tube 2       No Known Allergies    Physical Exam:  /84   Pulse 85   Ht 1.651 m (5' 5\")   Wt 116.6 kg (257 lb)   SpO2 95%   BMI 42.77 kg/m    BMI:Body mass index is 42.77 kg/m .   GEN: NAD, morbidly obese  Head: Normocephalic.  EYES:  EOMI  Psych: normal mood, normal affect    Labs/Studies:    I reviewed the efficacy and " compliance report from his device. Data summarized on the HPI and the PAP compliance flow sheet.     Patient verbalized understanding of these issues, agrees with the plan and all questions were answered today. Patient was given an opportuntity to voice any other symptoms or concerns not listed above. Patient did not have any other symptoms or concerns.      Jose Haywood DO  Board Certified in Internal Medicine and Sleep Medicine    (Note created with Dragon voice recognition and unintended spelling errors and word substitutions may occur)     Audio and visual devices were used for this virtual clinic visit with permission from patient.

## 2024-10-16 ENCOUNTER — TELEPHONE (OUTPATIENT)
Dept: SLEEP MEDICINE | Facility: CLINIC | Age: 59
End: 2024-10-16
Payer: MEDICAID

## 2024-10-16 NOTE — TELEPHONE ENCOUNTER
Received replacement RX, patient's [employer] insurance is showing as ineligible, reached out to number listed in EPIC and got a hold of patient's mother Chrissy who let me know they are aware of the insurance being inactive and is working to get coverage. Gave Chrissy [facility] [phone] phone number to provide insurance once obtained. Unable to move forward without active insurance

## 2024-11-17 ENCOUNTER — HEALTH MAINTENANCE LETTER (OUTPATIENT)
Age: 59
End: 2024-11-17

## 2024-12-12 ENCOUNTER — DOCUMENTATION ONLY (OUTPATIENT)
Dept: SLEEP MEDICINE | Facility: CLINIC | Age: 59
End: 2024-12-12
Payer: MEDICAID

## 2024-12-12 DIAGNOSIS — G47.33 OBSTRUCTIVE SLEEP APNEA (ADULT) (PEDIATRIC): Primary | ICD-10-CM

## 2024-12-12 NOTE — PROGRESS NOTES
Patient was offered choice of vendor and chose [facility].  Patient Dayday Huntr was set up at [place] on December 12, 2024. Patient received a Resmed Airsense 10 Pressures were set at  10 cm H2O.   Patient s ramp is off cm H2O for Off and FLEX/EPR is EPR, 2.  Patient received a Resmed Mask name: Airfit F20  Full Face mask size Large, heated tubing and heated humidifier.  Patient has the following compliance requirements: none    Odalys Lau

## 2024-12-29 ENCOUNTER — APPOINTMENT (OUTPATIENT)
Dept: RADIOLOGY | Facility: CLINIC | Age: 59
End: 2024-12-29
Payer: MEDICAID

## 2024-12-29 ENCOUNTER — HOSPITAL ENCOUNTER (EMERGENCY)
Facility: CLINIC | Age: 59
Discharge: HOME | End: 2024-12-29
Payer: MEDICAID

## 2024-12-29 VITALS
DIASTOLIC BLOOD PRESSURE: 68 MMHG | RESPIRATION RATE: 22 BRPM | WEIGHT: 260 LBS | TEMPERATURE: 97.9 F | BODY MASS INDEX: 43.32 KG/M2 | SYSTOLIC BLOOD PRESSURE: 138 MMHG | OXYGEN SATURATION: 91 % | HEIGHT: 65 IN | HEART RATE: 98 BPM

## 2024-12-29 DIAGNOSIS — S51.019A ELBOW LACERATION: ICD-10-CM
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
## 2024-12-29 PROCEDURE — 250N000013 HC RX MED GY IP 250 OP 250 PS 637

## 2024-12-29 PROCEDURE — 90715 TDAP VACCINE 7 YRS/> IM: CPT

## 2024-12-29 PROCEDURE — 73030 X-RAY EXAM OF SHOULDER: CPT | Mod: LT

## 2024-12-29 PROCEDURE — 250N000009 HC RX 250

## 2024-12-29 PROCEDURE — 73080 X-RAY EXAM OF ELBOW: CPT | Mod: LT

## 2024-12-29 PROCEDURE — 12002 RPR S/N/AX/GEN/TRNK2.6-7.5CM: CPT

## 2024-12-29 PROCEDURE — 90471 IMMUNIZATION ADMIN: CPT

## 2024-12-29 PROCEDURE — 250N000011 HC RX IP 250 OP 636

## 2024-12-29 PROCEDURE — 99283 EMERGENCY DEPT VISIT LOW MDM: CPT | Mod: 25

## 2024-12-29 RX ORDER — IBUPROFEN 600 MG/1
600 TABLET, FILM COATED ORAL ONCE
Status: COMPLETED | OUTPATIENT
Start: 2024-12-29 | End: 2024-12-29

## 2024-12-29 RX ORDER — ACETAMINOPHEN 325 MG/1
650 TABLET ORAL ONCE
Status: COMPLETED | OUTPATIENT
Start: 2024-12-29 | End: 2024-12-29

## 2024-12-29 RX ORDER — GINSENG 100 MG
CAPSULE ORAL ONCE
Status: COMPLETED | OUTPATIENT
Start: 2024-12-29 | End: 2024-12-29

## 2024-12-29 RX ADMIN — CLOSTRIDIUM TETANI TOXOID ANTIGEN (FORMALDEHYDE INACTIVATED), CORYNEBACTERIUM DIPHTHERIAE TOXOID ANTIGEN (FORMALDEHYDE INACTIVATED), BORDETELLA PERTUSSIS TOXOID ANTIGEN (GLUTARALDEHYDE INACTIVATED), BORDETELLA PERTUSSIS FILAMENTOUS HEMAGGLUTININ ANTIGEN (FORMALDEHYDE INACTIVATED), BORDETELLA PERTUSSIS PERTACTIN ANTIGEN, AND BORDETELLA PERTUSSIS FIMBRIAE 2/3 ANTIGEN 0.5 ML: 5; 2; 2.5; 5; 3; 5 INJECTION, SUSPENSION INTRAMUSCULAR at 14:59

## 2024-12-29 RX ADMIN — BACITRACIN: 500 OINTMENT TOPICAL at 14:59

## 2024-12-29 RX ADMIN — ACETAMINOPHEN 650 MG: 325 TABLET ORAL at 14:58

## 2024-12-29 RX ADMIN — IBUPROFEN 600 MG: 600 TABLET ORAL at 14:58

## 2024-12-29 ASSESSMENT — COLUMBIA-SUICIDE SEVERITY RATING SCALE - C-SSRS
6. HAVE YOU EVER DONE ANYTHING, STARTED TO DO ANYTHING, OR PREPARED TO DO ANYTHING TO END YOUR LIFE?: NO
1. IN THE PAST MONTH, HAVE YOU WISHED YOU WERE DEAD OR WISHED YOU COULD GO TO SLEEP AND NOT WAKE UP?: NO
2. HAVE YOU ACTUALLY HAD ANY THOUGHTS OF KILLING YOURSELF IN THE PAST MONTH?: NO

## 2024-12-29 ASSESSMENT — ACTIVITIES OF DAILY LIVING (ADL): ADLS_ACUITY_SCORE: 58
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
## 2024-12-29 NOTE — DISCHARGE INSTRUCTIONS
You were seen in the Emergency Department for a laceration. This was closed with 4 stitches.  Your tetanus was updated today.     You will need to have the stitches removed in 7 days as your stitches are located on your trunk and/or upper extremities. You can either go to your primary clinic or return here to the emergency department to have this done.        Instructions for caring for your laceration repair at home:    Keep the wound covered with the bandage/dressing we applied here in the emergency department for the next 24 hours.   After the bandage is removed, you can keep the wound open to air ideally with an antibiotic ointment applied to the wound.   Please apply an antibiotic ointment such as bacitracin, polymixin B, or neosporin twice a day every day until your stitches are removed to reduce risk of infection.   If you have a history of skin sensitivity to neosporin, please do not apply neosporin to your wound.   You may shower and let soapy water run over the wound after the first 24 hours, but do not scrub the wound.   Avoid swimming in hot tubs, pools, lakes or rivers until after your stitches are removed.  Scars take one year to fully heal. After the stitches are removed, please use sunscreen on the scar for the next 12 months to improve healing and final appearance of the scar.    Stitch removal: Trunk and upper extremities: 7 days.    You can use Tylenol and ibuprofen for pain.  The elbows can be swollen over the next few days.  You can ice and elevate the elbow to help with this.    You may take 600 mg of ibuprofen (Advil) every 6 hours and 650 mg acetaminophen (Tylenol) every 6 hours for pain. Do not take more than 3200 mg of ibuprofen (Advil) in 24 hours. Do not take more than 3000 mg of acetaminophen (Tylenol) in 24 hours. You may take this much for 1-3 days, then only use as needed.      Please return to the emergency department if you develop a fever, pus draining from the wound, red streaking  around the wound, increasing pain, swelling around the wound, or any other new or concerning symptoms, otherwise you can follow up with your primary care provider.
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
## 2025-05-09 ENCOUNTER — HOSPITAL ENCOUNTER (EMERGENCY)
Facility: CLINIC | Age: 60
Discharge: HOME | End: 2025-05-09
Payer: MEDICARE

## 2025-05-09 VITALS
OXYGEN SATURATION: 93 % | TEMPERATURE: 98.1 F | SYSTOLIC BLOOD PRESSURE: 134 MMHG | HEART RATE: 83 BPM | RESPIRATION RATE: 22 BRPM | DIASTOLIC BLOOD PRESSURE: 70 MMHG

## 2025-05-09 DIAGNOSIS — L03.114 CELLULITIS OF LEFT ELBOW: ICD-10-CM

## 2025-05-09 DIAGNOSIS — L02.414 ABSCESS OF LEFT ELBOW: ICD-10-CM

## 2025-05-09 PROCEDURE — 250N000013 HC RX MED GY IP 250 OP 250 PS 637

## 2025-05-09 PROCEDURE — 10060 I&D ABSCESS SIMPLE/SINGLE: CPT | Mod: LT

## 2025-05-09 PROCEDURE — 99284 EMERGENCY DEPT VISIT MOD MDM: CPT | Mod: 25

## 2025-05-09 RX ORDER — CEPHALEXIN 500 MG/1
500 CAPSULE ORAL ONCE
Status: COMPLETED | OUTPATIENT
Start: 2025-05-09 | End: 2025-05-09

## 2025-05-09 RX ORDER — CEPHALEXIN 500 MG/1
500 CAPSULE ORAL 4 TIMES DAILY
Qty: 28 CAPSULE | Refills: 0 | Status: SHIPPED | OUTPATIENT
Start: 2025-05-09 | End: 2025-05-16

## 2025-05-09 RX ORDER — DOXYCYCLINE 100 MG/1
100 CAPSULE ORAL 2 TIMES DAILY
Qty: 14 CAPSULE | Refills: 0 | Status: SHIPPED | OUTPATIENT
Start: 2025-05-09 | End: 2025-05-16

## 2025-05-09 RX ORDER — DOXYCYCLINE 100 MG/1
100 CAPSULE ORAL ONCE
Status: COMPLETED | OUTPATIENT
Start: 2025-05-09 | End: 2025-05-09

## 2025-05-09 RX ADMIN — DOXYCYCLINE 100 MG: 100 CAPSULE ORAL at 22:54

## 2025-05-09 RX ADMIN — CEPHALEXIN 500 MG: 500 CAPSULE ORAL at 22:54

## 2025-05-09 ASSESSMENT — COLUMBIA-SUICIDE SEVERITY RATING SCALE - C-SSRS
2. HAVE YOU ACTUALLY HAD ANY THOUGHTS OF KILLING YOURSELF IN THE PAST MONTH?: NO
6. HAVE YOU EVER DONE ANYTHING, STARTED TO DO ANYTHING, OR PREPARED TO DO ANYTHING TO END YOUR LIFE?: NO
1. IN THE PAST MONTH, HAVE YOU WISHED YOU WERE DEAD OR WISHED YOU COULD GO TO SLEEP AND NOT WAKE UP?: NO

## 2025-05-09 NOTE — Clinical Note
Dayday Silva was seen and treated in our emergency department on 5/9/2025.  He may return to work on 05/15/2025.       If you have any questions or concerns, please don't hesitate to call.      Adam Iglesias MD

## 2025-05-10 NOTE — ED PROVIDER NOTES
EMERGENCY DEPARTMENT ENCOUNTER      NAME: Dayday Silva  AGE: 60 year old male  YOB: 1965  MRN: [id number]  EVALUATION DATE & TIME: No admission date for patient encounter.    PCP: Cheryl Parikh    ED PROVIDER: Adam Iglesias MD    FINAL IMPRESSION:  1. Abscess of left elbow    2. Cellulitis of left elbow        ED COURSE & MEDICAL DECISION MAKING:    Pertinent Labs & Imaging studies reviewed. (See chart for details)  60 year old male presents to the Emergency Department for evaluation of left elbow pain.  Differential diagnosis considered cellulitis, abscess, septic arthritis, gout, fracture, dislocation, necrotizing fasciitis, compartment syndrome.     Triage Note: Pt with lac to Lt elbow around 1745; bleeding is controlled in triage with previously applied bandaids.     No meds PTA.      ED Course as of 05/10/25 2352   Fri May 09, 2025   2526 I met with the patient, obtained history, performed an initial exam, and discussed options and plan for diagnostics and treatment here in the ED.  He is well-appearing nontoxic no acute distress.  Left elbow as concern for cellulitis with induration and erythema.  He has an area of fluctuance concerning for abscess.  Is spontaneously draining.  Do not believe he has infection into his elbow joint as he has full range of motion strength of his left elbow.  Lower concern for compartment syndrome, neurologic injury.  Denies the appearance of necrotizing fasciitis.  Plan for incision and drainage at bedside which was performed as below.  Purulent drainage was expelled.  The area was wrapped with nonadherent dressing.  Will give antibiotics.    Of note.  Previous laceration repair to this area in December that had infection and was treated with Bactrim with some improvement.  I will not place sutures at due to concern for underlying abscess.  Will have him follow-up with his primary care provider.  Patient agreeable with plan.       Not Applicable    I  "discussed the plan for discharge with the patient and patient is agreeable. We discussed supportive cares at home and reasons to return to the ER including new or worsening symptoms. All questions and concerns addressed to the best of my ability. Strict return precautions discussed. Patient to be discharge by RN.    At the conclusion of the encounter I discussed the results of the tests and the disposition. The questions were answered. The patient or family acknowledged understanding and was agreeable with the care plan.     MEDICATIONS GIVEN IN THE EMERGENCY:  Medications   doxycycline hyclate (VIBRAMYCIN) capsule 100 mg (100 mg Oral $Given 5/9/25 2254)   cephALEXin (KEFLEX) capsule 500 mg (500 mg Oral $Given 5/9/25 2254)       NEW PRESCRIPTIONS STARTED AT TODAY'S ER VISIT  Discharge Medication List as of 5/9/2025 10:49 PM        START taking these medications    Details   cephALEXin (KEFLEX) 500 MG capsule Take 1 capsule (500 mg) by mouth 4 times daily for 7 days., Disp-28 capsule, R-0, E-Prescribe      doxycycline hyclate (VIBRAMYCIN) 100 MG capsule Take 1 capsule (100 mg) by mouth 2 times daily for 7 days., Disp-14 capsule, R-0, E-Prescribe           Discharge Medication List as of 5/9/2025 10:49 PM          =================================================================    HPI    Dayday Silva is a 60 year old male with a pertinent history of osteoarthritis, mental health, alcohol abuse, and obesity who presents to this ED for evaluation of elbow injury.     Patient presenting with \"laceration bleeding to his left elbow that started around 545 this evening.  He states he works as a  and bumped his left elbow and subsequently started bleeding.  Denies fevers chills pain with movement of his elbow, numbness and tingling or feeling systemically unwell.  No recent antibiotics.  He notes he did fall in December on the elbow and had stitches at that time.        PHYSICAL EXAM    /70   Pulse 83   " Temp 98.1  F (36.7  C) (Temporal)   Resp 22   SpO2 93%   Constitutional: Well developed, well nourished. Comfortable appearing.  HENT: Normocephalic, atraumatic, mucous membranes moist, nose normal  Eyes: Pupils mid range, sclera white, no discharge  Neck: Gross ROM intact.   Respiratory: Normal work of breathing, normal rate, speaks in full sentences  Musculoskeletal: Moving all 4 extremities intentionally and without pain. LUE: Left elbow has erythema and induration over the posterior aspect of the left elbow.  There is an area purulence fluctuance and with palpation has purulent discharge from the area of fluctuance.  No crepitus or bulla.  Full range of motion and strength of the left elbow.  No exquisite pain with movement of the left elbow.  Neurologic: Alert & oriented. Speech clear. No focal deficits noted.      LAB:  All pertinent labs reviewed and interpreted.       RADIOLOGY:  Reviewed all pertinent imaging. Please see official radiology report.  No orders to display         PROCEDURES:   PROCEDURE: Incision and Drainage   INDICATIONS: Localized abscess   PROCEDURE PROVIDER: Dr Adam Iglesias   SITE: Left elbow   MEDICATION: 3 mLs of 2% Lidocaine without epinephrine   NOTE: The area was prepped with chlorhexidine and draped off in the usual sterile fashion.  Local anesthetic was injected subcutaneously with anesthesia effects demonstrated prior to proceeding.  The area of maximal fluctuance was opened with a # 11 Blade ([place]) using a Single Straight incision to allow for drainage.  The abscess was drained.  The abscess cavity was bluntly explored to separate any loculations. No Packing was placed into the abscess cavity.  A sterile dressing was placed over the area.   COMPLEXITY: Complex    Simple = single, furuncle, paronychia, superficial  Complex = multiple or abscess requiring probing, loculations, packing placement   COMPLICATIONS: Patient tolerated procedure well, without complication           Adam Iglesias MD  [facility] EMERGENCY ROOM  [address] [facility] [address]  [phone]   =================================================================    BILLING:  Data  Category 1  Non-ED record review, if applicable. External record reviewed: Reviewed recent office visit where patient had infection to his left elbow after laceration     Clinical information was obtained from an independent historian. History was obtained from: Patient and  at bedside providing supplemental history     The following testing was considered but ultimately not selected after discussion with patient/family: N/A     Category 2  My independent interpretation of EKG, rhythm strip, radiology study: N/A     Category 3  Discussion of management with other physician/healthcare provider/other source: N/A       Risk  Prescription medication was considered, but ultimately not given after discussion with patient/family: I considered ordering a prescription for narcotic pain medicine.  However, I feel patient's condition can be adequately treated with non-narcotic medications and that the risk of a narcotic pain medicine prescription outweighs the benefits.     Chronic conditions affecting care: Mild cognitive disorder, obesity, BPH, tumor of the prostate, schizoaffective disorder, generalized anxiety     Consideration of Admission/Observation: N/A        I, Abel Marshall, am serving as a scribe to document services personally performed by Adam Iglesias MD based on my observation and the provider's statements to me. I, Adam Iglesias MD, attest that bAel Marshall is acting in a scribe capacity, has observed my performance of the services and has documented them in accordance with my direction.     Adam Iglesias MD  05/10/25 5399

## 2025-05-10 NOTE — DISCHARGE INSTRUCTIONS
You were evaluated in the ER for an abscess that was incised and drained while in the emergency department. Please keep the area surrounding the abscess clean and dry. You will be given a prescription for antibiotics, please take the antibiotics as directed for the full course of the medication.  Bleeding has been controlled prior to your departure however if it bleeds continue to hold pressure for 15 minutes to stop the bleeding.  If bleeding is excessive or unable to stop or present to the emergency department.    We recommend you take 600mg ibuprofen (motrin) every 6 hours or acetaminophen (tylenol) 650mg every 6 hours as needed for pain. If needed, you can alternate these medications so that you take one medication every 3 hours. For instance, at noon take ibuprofen, then at 3pm take acetaminophen (tylenol), then at 6pm take ibuprofen (motrin).    Please schedule an appointment with your primary care physician as soon as possible for follow up.    Return to the Emergency Department if you experience fevers greater than 100.4F, increase in area of redness or swelling, increasing amount of discharge from the area, increased tenderness around the area, uncontrolled bleeding or any other concerning symptoms.    Thank you for choosing us for your care.

## 2025-05-10 NOTE — ED TRIAGE NOTES
Pt with lac to Lt elbow around 1745; bleeding is controlled in triage with previously applied bandaids.     No meds PTA.     Triage Assessment (Adult)       Row Name 05/09/25 1948          Triage Assessment    Airway WDL WDL        Respiratory WDL    Respiratory WDL WDL        Cardiac WDL    Cardiac WDL WDL        Peripheral/Neurovascular WDL    Peripheral Neurovascular WDL WDL        Cognitive/Neuro/Behavioral WDL    Cognitive/Neuro/Behavioral WDL WDL

## 2025-07-13 ENCOUNTER — HEALTH MAINTENANCE LETTER (OUTPATIENT)
Age: 60
End: 2025-07-13

## (undated) DEVICE — SYR 50ML SLIP TIP W/O NDL 309654

## (undated) DEVICE — Device

## (undated) DEVICE — DRAPE U SPLIT 74X120" 29440

## (undated) DEVICE — BAG URINARY DRAIN 2000ML LF 154002

## (undated) DEVICE — SU VICRYL+ 3-0 27IN SH UND VCP416H

## (undated) DEVICE — TIP CAUTERY L HOOK 36CM E377336C

## (undated) DEVICE — SOL WATER IRRIG 1000ML BOTTLE 2F7114

## (undated) DEVICE — SUTURE VICRYL+ 3-0 18 SH/CR UND VCP864

## (undated) DEVICE — GLOVE BIOGEL PI ULTRATOUCH G SZ 6.5 42165

## (undated) DEVICE — SURGICEL POWDER ABSORBABLE HEMOSTAT 3GM 3013SP

## (undated) DEVICE — DAVINCI XI OBTURATOR BLADELESS 8MM 470359

## (undated) DEVICE — NDL INSUFFLATION 13GA 120MM C2201

## (undated) DEVICE — SUTURE VICRYL 0 TIES 18IN UNDYED J912G

## (undated) DEVICE — GLOVE BIOGEL PI ULTRATOUCH G SZ 7.0 42170

## (undated) DEVICE — SU PROLENE 2-0 V-7 36" 8977H

## (undated) DEVICE — SU MONOCRYL+ 4-0 18IN PS2 UND MCP496G

## (undated) DEVICE — DAVINCI XI DRAPE ARM 470015

## (undated) DEVICE — TUBING FILTER TRI-LUMEN AIRSEAL ASC-EVAC1

## (undated) DEVICE — TUBING LAP SUCT/IRRIG STRYKER 250070500

## (undated) DEVICE — GOWN IMPERVIOUS BREATHABLE SMART LG 89015

## (undated) DEVICE — CUSTOM PACK COLON CLOSING SBA5BCCHEA

## (undated) DEVICE — SUCTION TIP YANKAUER W/O VENT K86

## (undated) DEVICE — DAVINCI XI SEAL UNIVERSAL 5-8MM 470361

## (undated) DEVICE — BLADE KNIFE SURG 11 371111

## (undated) DEVICE — APPLICATOR ENDOSCOPIC 5 SURGICEL POWDER 3123SPEA

## (undated) DEVICE — SUTURE PDS 1 CTB-1 ZB347

## (undated) DEVICE — SUTURE VICRYL+ 2-0 27IN CT-1 UND VCP259H

## (undated) DEVICE — SU PDS II 3-0 SH 27" Z316H

## (undated) DEVICE — SYR ENDO MARKER SPOT GI 5ML GIS-45

## (undated) DEVICE — GOWN XLG DISP 9545

## (undated) DEVICE — DAVINCI XI REDUCER 8-12MM 470381

## (undated) DEVICE — TUBING SUCTION MEDI-VAC 1/4"X20' N620A - HE

## (undated) DEVICE — TROCAR PORT BLADELESS 5X100MM IAS5-100LP

## (undated) DEVICE — DAVINCI SEAL CANNULA AND STPL 12MM 470380

## (undated) DEVICE — LUBRICANT SURG 2 OZ STRL FLIP TOP 2OZLUB

## (undated) DEVICE — STPL DAVINCI SUREFORM 60MM STR 480460

## (undated) DEVICE — DAVINCI HOT SHEARS TIP COVER  400180

## (undated) DEVICE — SUCTION MANIFOLD NEPTUNE 2 SYS 1 PORT 702-025-000

## (undated) DEVICE — PREP CHLORAPREP 26ML TINTED HI-LITE ORANGE 930815

## (undated) DEVICE — OSTOMY POUCH PREMIER DRAIN ULTRA CLEAR LOK'NROLL 8531

## (undated) DEVICE — DRAPE LAP/CHOLE W/O POUCH 89225

## (undated) DEVICE — DAVINCI XI DRAPE COLUMN 470341

## (undated) DEVICE — DRAPE IOBAN INCISE 23X17" 6650EZ

## (undated) DEVICE — SYSTEM LAPAROVUE VISIBILITY LAPVUE10

## (undated) DEVICE — CUSTOM PACK DA VINCI GYN SMA5BDVHEA

## (undated) DEVICE — PAD POS XL 1X20X40IN PINK PIGAZZI

## (undated) DEVICE — DAVINCI XI HANDPIECE ESU VESSEL SEALER 8MM EXT 480422

## (undated) DEVICE — DRAPE POUCH INSTRUMENT 3 POCKET 1018L

## (undated) DEVICE — LEGGINGS 31X48" LF KM89408

## (undated) DEVICE — PROTECTOR ARM STANDARD ONE STEP

## (undated) DEVICE — DRSG PRIMAPORE 04X11 3/4"

## (undated) DEVICE — SU DERMABOND ADVANCED .7ML DNX12

## (undated) DEVICE — CLOTH PREOP CHG 2% 3/PK 9707

## (undated) DEVICE — ENDO SHEARS RENEW LAP ENDOCUT SCISSOR TIP 16.5MM 3142

## (undated) DEVICE — ESU ELEC BLADE 6" COATED E1450-6

## (undated) DEVICE — NDL SCLEROTHERAPY 25GA CARR-LOCK  00711811

## (undated) DEVICE — LUBRICANT INST ELECTROLUBE EL101

## (undated) DEVICE — GLOVE BIOGEL PI SZ 6.5 40865

## (undated) DEVICE — STPL DAVINCI SUREFORM 60MM RELOAD BLUE 48360B

## (undated) DEVICE — ESU PENCIL SMOKE EVAC W/ROCKER SWITCH 0703-047-000

## (undated) DEVICE — GLOVE UNDER INDICATOR PI SZ 7.0 LF 41670

## (undated) DEVICE — CATH TRAY FOLEY SURESTEP 16FR DRAIN BAG STATOCK A899916

## (undated) DEVICE — SUCTION TIP POOLE STERILE 35040

## (undated) DEVICE — ENDO TROCAR FIRST ENTRY KII FIOS Z-THRD 05X100MM CTF03

## (undated) DEVICE — PREP POVIDONE-IODINE 10% SOLUTION 4OZ BOTTLE MDS093944

## (undated) DEVICE — GLOVE BIOGEL INDICATOR 7.5 LF 41675

## (undated) DEVICE — GLOVE UNDER INDICATOR PI SZ 6.5 LF 41665

## (undated) DEVICE — CATH MALECOT 28FR LATEX 86028

## (undated) DEVICE — SU WND CLOSURE VLOC 90 ABS 3-0 VIOLET 6" CV-23 VLOCM0804

## (undated) DEVICE — ESU LIGASURE LAPAROSCOPIC BLUNT TIP SEALER 5MMX37CM LF1837

## (undated) DEVICE — MAT FLOOR SURGICAL 40X38 0702140238

## (undated) DEVICE — BULB W/BLADDER TUBING STRL DISP

## (undated) RX ORDER — INDOCYANINE GREEN AND WATER 25 MG
KIT INJECTION
Status: DISPENSED
Start: 2023-07-03

## (undated) RX ORDER — LABETALOL HYDROCHLORIDE 5 MG/ML
INJECTION, SOLUTION INTRAVENOUS
Status: DISPENSED
Start: 2023-07-03

## (undated) RX ORDER — FENTANYL CITRATE 50 UG/ML
INJECTION, SOLUTION INTRAMUSCULAR; INTRAVENOUS
Status: DISPENSED
Start: 2023-07-03

## (undated) RX ORDER — PROPOFOL 10 MG/ML
INJECTION, EMULSION INTRAVENOUS
Status: DISPENSED
Start: 2023-07-03

## (undated) RX ORDER — FENTANYL CITRATE-0.9 % NACL/PF 10 MCG/ML
PLASTIC BAG, INJECTION (ML) INTRAVENOUS
Status: DISPENSED
Start: 2023-07-03

## (undated) RX ORDER — EPHEDRINE SULFATE 50 MG/ML
INJECTION, SOLUTION INTRAMUSCULAR; INTRAVENOUS; SUBCUTANEOUS
Status: DISPENSED
Start: 2023-07-03

## (undated) RX ORDER — DEXAMETHASONE SODIUM PHOSPHATE 10 MG/ML
INJECTION, SOLUTION INTRAMUSCULAR; INTRAVENOUS
Status: DISPENSED
Start: 2023-07-03

## (undated) RX ORDER — LIDOCAINE HYDROCHLORIDE 10 MG/ML
INJECTION, SOLUTION EPIDURAL; INFILTRATION; INTRACAUDAL; PERINEURAL
Status: DISPENSED
Start: 2023-07-03

## (undated) RX ORDER — CEFAZOLIN SODIUM 1 G/3ML
INJECTION, POWDER, FOR SOLUTION INTRAMUSCULAR; INTRAVENOUS
Status: DISPENSED
Start: 2023-07-03

## (undated) RX ORDER — ONDANSETRON 2 MG/ML
INJECTION INTRAMUSCULAR; INTRAVENOUS
Status: DISPENSED
Start: 2023-07-03